# Patient Record
Sex: FEMALE | Race: WHITE | NOT HISPANIC OR LATINO | Employment: OTHER | URBAN - METROPOLITAN AREA
[De-identification: names, ages, dates, MRNs, and addresses within clinical notes are randomized per-mention and may not be internally consistent; named-entity substitution may affect disease eponyms.]

---

## 2017-01-27 ENCOUNTER — CONVERSION ENCOUNTER (OUTPATIENT)
Dept: MAMMOGRAPHY | Facility: HOSPITAL | Age: 82
End: 2017-01-27

## 2017-08-30 ENCOUNTER — CONVERSION ENCOUNTER (OUTPATIENT)
Dept: MAMMOGRAPHY | Facility: HOSPITAL | Age: 82
End: 2017-08-30

## 2018-07-23 ENCOUNTER — OFFICE VISIT CONVERTED (OUTPATIENT)
Dept: ORTHOPEDIC SURGERY | Facility: CLINIC | Age: 83
End: 2018-07-23
Attending: ORTHOPAEDIC SURGERY

## 2018-08-10 ENCOUNTER — OFFICE VISIT CONVERTED (OUTPATIENT)
Dept: ORTHOPEDIC SURGERY | Facility: CLINIC | Age: 83
End: 2018-08-10
Attending: PHYSICIAN ASSISTANT

## 2018-08-31 ENCOUNTER — OFFICE VISIT CONVERTED (OUTPATIENT)
Dept: ORTHOPEDIC SURGERY | Facility: CLINIC | Age: 83
End: 2018-08-31
Attending: PHYSICIAN ASSISTANT

## 2018-09-28 ENCOUNTER — OFFICE VISIT CONVERTED (OUTPATIENT)
Dept: ORTHOPEDIC SURGERY | Facility: CLINIC | Age: 83
End: 2018-09-28
Attending: PHYSICIAN ASSISTANT

## 2018-11-07 ENCOUNTER — OFFICE VISIT CONVERTED (OUTPATIENT)
Dept: ORTHOPEDIC SURGERY | Facility: CLINIC | Age: 83
End: 2018-11-07
Attending: PHYSICIAN ASSISTANT

## 2018-11-17 ENCOUNTER — CONVERSION ENCOUNTER (OUTPATIENT)
Dept: MAMMOGRAPHY | Facility: HOSPITAL | Age: 83
End: 2018-11-17

## 2018-12-04 ENCOUNTER — CONVERSION ENCOUNTER (OUTPATIENT)
Dept: GASTROENTEROLOGY | Facility: CLINIC | Age: 83
End: 2018-12-04

## 2018-12-04 ENCOUNTER — OFFICE VISIT CONVERTED (OUTPATIENT)
Dept: GASTROENTEROLOGY | Facility: CLINIC | Age: 83
End: 2018-12-04
Attending: NURSE PRACTITIONER

## 2018-12-27 ENCOUNTER — OFFICE VISIT CONVERTED (OUTPATIENT)
Dept: NEUROLOGY | Facility: CLINIC | Age: 83
End: 2018-12-27
Attending: PSYCHIATRY & NEUROLOGY

## 2019-01-07 ENCOUNTER — OFFICE VISIT CONVERTED (OUTPATIENT)
Dept: ORTHOPEDIC SURGERY | Facility: CLINIC | Age: 84
End: 2019-01-07
Attending: PHYSICIAN ASSISTANT

## 2019-01-11 ENCOUNTER — HOSPITAL ENCOUNTER (OUTPATIENT)
Dept: OTHER | Facility: HOSPITAL | Age: 84
Discharge: HOME | End: 2019-01-11
Attending: FAMILY MEDICINE

## 2019-01-11 LAB
ALBUMIN SERPL-MCNC: 4.4 G/DL (ref 3.5–5)
ALBUMIN/GLOB SERPL: 1.6 {RATIO} (ref 1.4–2.6)
ALP SERPL-CCNC: 112 U/L (ref 43–160)
ALT SERPL-CCNC: 16 U/L (ref 10–40)
ANION GAP SERPL CALC-SCNC: 17 MMOL/L (ref 8–19)
AST SERPL-CCNC: 24 U/L (ref 15–50)
BILIRUB SERPL-MCNC: 0.56 MG/DL (ref 0.2–1.3)
BUN SERPL-MCNC: 23 MG/DL (ref 5–25)
BUN/CREAT SERPL: 29 {RATIO} (ref 6–20)
CALCIUM SERPL-MCNC: 9.4 MG/DL (ref 8.7–10.4)
CHLORIDE SERPL-SCNC: 101 MMOL/L (ref 99–111)
CONV CO2: 29 MMOL/L (ref 22–32)
CONV TOTAL PROTEIN: 7.1 G/DL (ref 6.3–8.2)
CREAT UR-MCNC: 0.78 MG/DL (ref 0.5–0.9)
GFR SERPLBLD BASED ON 1.73 SQ M-ARVRAT: >60 ML/MIN/{1.73_M2}
GLOBULIN UR ELPH-MCNC: 2.7 G/DL (ref 2–3.5)
GLUCOSE SERPL-MCNC: 72 MG/DL (ref 65–99)
OSMOLALITY SERPL CALC.SUM OF ELEC: 298 MOSM/KG (ref 273–304)
POTASSIUM SERPL-SCNC: 4 MMOL/L (ref 3.5–5.3)
SODIUM SERPL-SCNC: 143 MMOL/L (ref 135–147)

## 2019-02-18 ENCOUNTER — HOSPITAL ENCOUNTER (OUTPATIENT)
Dept: OTHER | Facility: HOSPITAL | Age: 84
Discharge: HOME | End: 2019-02-18
Attending: FAMILY MEDICINE

## 2019-02-18 ENCOUNTER — OFFICE VISIT CONVERTED (OUTPATIENT)
Dept: ORTHOPEDIC SURGERY | Facility: CLINIC | Age: 84
End: 2019-02-18
Attending: PHYSICIAN ASSISTANT

## 2019-02-18 LAB
APPEARANCE UR: ABNORMAL
BILIRUB UR QL: NEGATIVE
COLOR UR: YELLOW
CONV BACTERIA: NEGATIVE
CONV COLLECTION SOURCE (UA): ABNORMAL
CONV UROBILINOGEN IN URINE BY AUTOMATED TEST STRIP: 0.2 {EHRLICHU}/DL (ref 0.1–1)
GLUCOSE UR QL: NEGATIVE MG/DL
HGB UR QL STRIP: NEGATIVE
KETONES UR QL STRIP: NEGATIVE MG/DL
LEUKOCYTE ESTERASE UR QL STRIP: ABNORMAL
NITRITE UR QL STRIP: NEGATIVE
PH UR STRIP.AUTO: 5 [PH] (ref 5–8)
PROT UR QL: NEGATIVE MG/DL
RBC #/AREA URNS HPF: ABNORMAL /[HPF]
SP GR UR: 1.02 (ref 1–1.03)
SQUAMOUS SPT QL MICRO: ABNORMAL /[HPF]
WBC #/AREA URNS HPF: ABNORMAL /[HPF]

## 2019-02-20 LAB
AMOXICILLIN+CLAV SUSC ISLT: 8
AMPICILLIN SUSC ISLT: >=32
AMPICILLIN+SULBAC SUSC ISLT: >=32
BACTERIA UR CULT: ABNORMAL
CEFAZOLIN SUSC ISLT: 8
CEFEPIME SUSC ISLT: <=1
CEFTAZIDIME SUSC ISLT: <=1
CEFTRIAXONE SUSC ISLT: <=1
CEFUROXIME ORAL SUSC ISLT: <=1
CEFUROXIME PARENTER SUSC ISLT: <=1
CIPROFLOXACIN SUSC ISLT: <=0.25
ERTAPENEM SUSC ISLT: <=0.5
GENTAMICIN SUSC ISLT: <=1
LEVOFLOXACIN SUSC ISLT: <=0.12
NITROFURANTOIN SUSC ISLT: 128
TETRACYCLINE SUSC ISLT: >=16
TMP SMX SUSC ISLT: >=320
TOBRAMYCIN SUSC ISLT: <=1

## 2019-05-14 ENCOUNTER — HOSPITAL ENCOUNTER (OUTPATIENT)
Dept: OTHER | Facility: HOSPITAL | Age: 84
Discharge: HOME | End: 2019-05-14
Attending: FAMILY MEDICINE

## 2019-05-14 LAB
ALBUMIN SERPL-MCNC: 4 G/DL (ref 3.5–5)
ALBUMIN/GLOB SERPL: 1.5 {RATIO} (ref 1.4–2.6)
ALP SERPL-CCNC: 111 U/L (ref 43–160)
ALT SERPL-CCNC: 15 U/L (ref 10–40)
ANION GAP SERPL CALC-SCNC: 17 MMOL/L (ref 8–19)
AST SERPL-CCNC: 21 U/L (ref 15–50)
BILIRUB SERPL-MCNC: 0.47 MG/DL (ref 0.2–1.3)
BUN SERPL-MCNC: 24 MG/DL (ref 5–25)
BUN/CREAT SERPL: 30 {RATIO} (ref 6–20)
CALCIUM SERPL-MCNC: 9.6 MG/DL (ref 8.7–10.4)
CHLORIDE SERPL-SCNC: 104 MMOL/L (ref 99–111)
CONV CO2: 29 MMOL/L (ref 22–32)
CONV TOTAL PROTEIN: 6.7 G/DL (ref 6.3–8.2)
CREAT UR-MCNC: 0.8 MG/DL (ref 0.5–0.9)
GFR SERPLBLD BASED ON 1.73 SQ M-ARVRAT: >60 ML/MIN/{1.73_M2}
GLOBULIN UR ELPH-MCNC: 2.7 G/DL (ref 2–3.5)
GLUCOSE SERPL-MCNC: 88 MG/DL (ref 65–99)
OSMOLALITY SERPL CALC.SUM OF ELEC: 307 MOSM/KG (ref 273–304)
POTASSIUM SERPL-SCNC: 3.3 MMOL/L (ref 3.5–5.3)
SODIUM SERPL-SCNC: 147 MMOL/L (ref 135–147)

## 2019-06-11 ENCOUNTER — HOSPITAL ENCOUNTER (OUTPATIENT)
Dept: OTHER | Facility: HOSPITAL | Age: 84
Discharge: HOME | End: 2019-06-11
Attending: FAMILY MEDICINE

## 2019-06-11 LAB
APPEARANCE UR: CLEAR
BILIRUB UR QL: NEGATIVE
COLOR UR: YELLOW
CONV BACTERIA: NEGATIVE
CONV COLLECTION SOURCE (UA): ABNORMAL
CONV HYALINE CASTS IN URINE MICRO: ABNORMAL /[LPF]
CONV UROBILINOGEN IN URINE BY AUTOMATED TEST STRIP: 1 {EHRLICHU}/DL (ref 0.1–1)
GLUCOSE UR QL: NEGATIVE MG/DL
HGB UR QL STRIP: NEGATIVE
KETONES UR QL STRIP: NEGATIVE MG/DL
LEUKOCYTE ESTERASE UR QL STRIP: ABNORMAL
NITRITE UR QL STRIP: NEGATIVE
PH UR STRIP.AUTO: 6 [PH] (ref 5–8)
PROT UR QL: NEGATIVE MG/DL
RBC #/AREA URNS HPF: ABNORMAL /[HPF]
SP GR UR: 1.02 (ref 1–1.03)
SQUAMOUS SPT QL MICRO: ABNORMAL /[HPF]
WBC #/AREA URNS HPF: ABNORMAL /[HPF]

## 2019-06-13 LAB — BACTERIA UR CULT: NORMAL

## 2019-06-26 ENCOUNTER — HOSPITAL ENCOUNTER (OUTPATIENT)
Dept: OTHER | Facility: HOSPITAL | Age: 84
Discharge: HOME | End: 2019-06-26
Attending: FAMILY MEDICINE

## 2019-06-26 LAB
ANION GAP SERPL CALC-SCNC: 17 MMOL/L (ref 8–19)
BUN SERPL-MCNC: 26 MG/DL (ref 5–25)
BUN/CREAT SERPL: 28 {RATIO} (ref 6–20)
CALCIUM SERPL-MCNC: 9.2 MG/DL (ref 8.7–10.4)
CHLORIDE SERPL-SCNC: 104 MMOL/L (ref 99–111)
CONV CO2: 25 MMOL/L (ref 22–32)
CREAT UR-MCNC: 0.94 MG/DL (ref 0.5–0.9)
GFR SERPLBLD BASED ON 1.73 SQ M-ARVRAT: 57 ML/MIN/{1.73_M2}
GLUCOSE SERPL-MCNC: 87 MG/DL (ref 65–99)
OSMOLALITY SERPL CALC.SUM OF ELEC: 298 MOSM/KG (ref 273–304)
POTASSIUM SERPL-SCNC: 3.8 MMOL/L (ref 3.5–5.3)
SODIUM SERPL-SCNC: 142 MMOL/L (ref 135–147)

## 2019-07-11 ENCOUNTER — HOSPITAL ENCOUNTER (OUTPATIENT)
Dept: URGENT CARE | Facility: CLINIC | Age: 84
Discharge: HOME | End: 2019-07-11

## 2019-09-05 ENCOUNTER — HOSPITAL ENCOUNTER (OUTPATIENT)
Dept: OTHER | Facility: HOSPITAL | Age: 84
Discharge: HOME | End: 2019-09-05
Attending: FAMILY MEDICINE

## 2019-09-05 LAB
ALBUMIN SERPL-MCNC: 4.3 G/DL (ref 3.5–5)
ALBUMIN/GLOB SERPL: 1.8 {RATIO} (ref 1.4–2.6)
ALP SERPL-CCNC: 101 U/L (ref 43–160)
ALT SERPL-CCNC: 14 U/L (ref 10–40)
ANION GAP SERPL CALC-SCNC: 17 MMOL/L (ref 8–19)
AST SERPL-CCNC: 21 U/L (ref 15–50)
BILIRUB SERPL-MCNC: 0.63 MG/DL (ref 0.2–1.3)
BUN SERPL-MCNC: 26 MG/DL (ref 5–25)
BUN/CREAT SERPL: 34 {RATIO} (ref 6–20)
CALCIUM SERPL-MCNC: 9.3 MG/DL (ref 8.7–10.4)
CHLORIDE SERPL-SCNC: 105 MMOL/L (ref 99–111)
CHOLEST SERPL-MCNC: 160 MG/DL (ref 107–200)
CHOLEST/HDLC SERPL: 2.8 {RATIO} (ref 3–6)
CONV CO2: 26 MMOL/L (ref 22–32)
CONV TOTAL PROTEIN: 6.7 G/DL (ref 6.3–8.2)
CREAT UR-MCNC: 0.77 MG/DL (ref 0.5–0.9)
GFR SERPLBLD BASED ON 1.73 SQ M-ARVRAT: >60 ML/MIN/{1.73_M2}
GLOBULIN UR ELPH-MCNC: 2.4 G/DL (ref 2–3.5)
GLUCOSE SERPL-MCNC: 92 MG/DL (ref 65–99)
HDLC SERPL-MCNC: 57 MG/DL (ref 40–60)
LDLC SERPL CALC-MCNC: 81 MG/DL (ref 70–100)
OSMOLALITY SERPL CALC.SUM OF ELEC: 302 MOSM/KG (ref 273–304)
POTASSIUM SERPL-SCNC: 3.8 MMOL/L (ref 3.5–5.3)
SODIUM SERPL-SCNC: 144 MMOL/L (ref 135–147)
TRIGL SERPL-MCNC: 109 MG/DL (ref 40–150)
VLDLC SERPL-MCNC: 22 MG/DL (ref 5–37)

## 2019-09-06 ENCOUNTER — HOSPITAL ENCOUNTER (OUTPATIENT)
Dept: URGENT CARE | Facility: CLINIC | Age: 84
Discharge: HOME | End: 2019-09-06
Attending: PHYSICIAN ASSISTANT

## 2019-12-29 ENCOUNTER — HOSPITAL ENCOUNTER (OUTPATIENT)
Dept: URGENT CARE | Facility: CLINIC | Age: 84
Discharge: HOME | End: 2019-12-29

## 2019-12-31 LAB — BACTERIA UR CULT: NORMAL

## 2020-01-13 ENCOUNTER — HOSPITAL ENCOUNTER (OUTPATIENT)
Dept: OTHER | Facility: HOSPITAL | Age: 85
Discharge: HOME | End: 2020-01-13
Attending: FAMILY MEDICINE

## 2020-01-13 LAB
ALBUMIN SERPL-MCNC: 4.2 G/DL (ref 3.5–5)
ALBUMIN/GLOB SERPL: 1.8 {RATIO} (ref 1.4–2.6)
ALP SERPL-CCNC: 93 U/L (ref 43–160)
ALT SERPL-CCNC: 12 U/L (ref 10–40)
ANION GAP SERPL CALC-SCNC: 15 MMOL/L (ref 8–19)
AST SERPL-CCNC: 19 U/L (ref 15–50)
BILIRUB SERPL-MCNC: 0.45 MG/DL (ref 0.2–1.3)
BUN SERPL-MCNC: 24 MG/DL (ref 5–25)
BUN/CREAT SERPL: 23 {RATIO} (ref 6–20)
CALCIUM SERPL-MCNC: 10 MG/DL (ref 8.7–10.4)
CHLORIDE SERPL-SCNC: 104 MMOL/L (ref 99–111)
CONV CO2: 28 MMOL/L (ref 22–32)
CONV TOTAL PROTEIN: 6.6 G/DL (ref 6.3–8.2)
CREAT UR-MCNC: 1.04 MG/DL (ref 0.5–0.9)
GFR SERPLBLD BASED ON 1.73 SQ M-ARVRAT: 50 ML/MIN/{1.73_M2}
GLOBULIN UR ELPH-MCNC: 2.4 G/DL (ref 2–3.5)
GLUCOSE SERPL-MCNC: 88 MG/DL (ref 65–99)
OSMOLALITY SERPL CALC.SUM OF ELEC: 299 MOSM/KG (ref 273–304)
POTASSIUM SERPL-SCNC: 3.8 MMOL/L (ref 3.5–5.3)
SODIUM SERPL-SCNC: 143 MMOL/L (ref 135–147)

## 2020-01-16 ENCOUNTER — HOSPITAL ENCOUNTER (OUTPATIENT)
Dept: OTHER | Facility: HOSPITAL | Age: 85
Discharge: HOME | End: 2020-01-16
Attending: FAMILY MEDICINE

## 2020-01-16 LAB
APPEARANCE UR: CLEAR
BILIRUB UR QL: NEGATIVE
COLOR UR: YELLOW
CONV BACTERIA: NEGATIVE
CONV COLLECTION SOURCE (UA): ABNORMAL
CONV UROBILINOGEN IN URINE BY AUTOMATED TEST STRIP: 0.2 {EHRLICHU}/DL (ref 0.1–1)
GLUCOSE UR QL: NEGATIVE MG/DL
HGB UR QL STRIP: NEGATIVE
KETONES UR QL STRIP: NEGATIVE MG/DL
LEUKOCYTE ESTERASE UR QL STRIP: ABNORMAL
NITRITE UR QL STRIP: NEGATIVE
PH UR STRIP.AUTO: 6 [PH] (ref 5–8)
PROT UR QL: NEGATIVE MG/DL
RBC #/AREA URNS HPF: ABNORMAL /[HPF]
SP GR UR: 1.01 (ref 1–1.03)
SQUAMOUS SPT QL MICRO: ABNORMAL /[HPF]
WBC #/AREA URNS HPF: ABNORMAL /[HPF]

## 2020-01-19 LAB
AMOXICILLIN+CLAV SUSC ISLT: 16
AMOXICILLIN+CLAV SUSC ISLT: 8
AMPICILLIN SUSC ISLT: >=32
AMPICILLIN SUSC ISLT: >=32
AMPICILLIN+SULBAC SUSC ISLT: 16
AMPICILLIN+SULBAC SUSC ISLT: >=32
BACTERIA UR CULT: ABNORMAL
CEFAZOLIN SUSC ISLT: 16
CEFAZOLIN SUSC ISLT: >=64
CEFEPIME SUSC ISLT: 8
CEFEPIME SUSC ISLT: <=1
CEFTAZIDIME SUSC ISLT: 4
CEFTAZIDIME SUSC ISLT: <=1
CEFTRIAXONE SUSC ISLT: <=1
CEFTRIAXONE SUSC ISLT: >=64
CEFUROXIME ORAL SUSC ISLT: <=1
CEFUROXIME ORAL SUSC ISLT: >=64
CEFUROXIME PARENTER SUSC ISLT: <=1
CEFUROXIME PARENTER SUSC ISLT: >=64
CIPROFLOXACIN SUSC ISLT: <=0.25
CIPROFLOXACIN SUSC ISLT: >=4
ERTAPENEM SUSC ISLT: <=0.5
ERTAPENEM SUSC ISLT: <=0.5
GENTAMICIN SUSC ISLT: <=1
GENTAMICIN SUSC ISLT: >=16
LEVOFLOXACIN SUSC ISLT: <=0.12
LEVOFLOXACIN SUSC ISLT: >=8
NITROFURANTOIN SUSC ISLT: 128
NITROFURANTOIN SUSC ISLT: <=16
TETRACYCLINE SUSC ISLT: <=1
TETRACYCLINE SUSC ISLT: >=16
TMP SMX SUSC ISLT: <=20
TMP SMX SUSC ISLT: >=320
TOBRAMYCIN SUSC ISLT: 8
TOBRAMYCIN SUSC ISLT: <=1

## 2020-02-05 ENCOUNTER — HOSPITAL ENCOUNTER (OUTPATIENT)
Dept: OTHER | Facility: HOSPITAL | Age: 85
Discharge: HOME | End: 2020-02-05
Attending: FAMILY MEDICINE

## 2020-02-05 LAB
APPEARANCE UR: CLEAR
BILIRUB UR QL: NEGATIVE
COLOR UR: YELLOW
CONV COLLECTION SOURCE (UA): NORMAL
CONV UROBILINOGEN IN URINE BY AUTOMATED TEST STRIP: 1 {EHRLICHU}/DL (ref 0.1–1)
GLUCOSE UR QL: NEGATIVE MG/DL
HGB UR QL STRIP: NEGATIVE
KETONES UR QL STRIP: NEGATIVE MG/DL
LEUKOCYTE ESTERASE UR QL STRIP: NEGATIVE
NITRITE UR QL STRIP: NEGATIVE
PH UR STRIP.AUTO: 5 [PH] (ref 5–8)
PROT UR QL: NEGATIVE MG/DL
SP GR UR: 1.01 (ref 1–1.03)

## 2020-02-07 LAB — BACTERIA UR CULT: NORMAL

## 2020-02-12 ENCOUNTER — HOSPITAL ENCOUNTER (OUTPATIENT)
Dept: OTHER | Facility: HOSPITAL | Age: 85
Discharge: HOME | End: 2020-02-12
Attending: FAMILY MEDICINE

## 2020-02-12 LAB
ALBUMIN SERPL-MCNC: 3.9 G/DL (ref 3.5–5)
ALBUMIN/GLOB SERPL: 1.4 {RATIO} (ref 1.4–2.6)
ALP SERPL-CCNC: 95 U/L (ref 43–160)
ALT SERPL-CCNC: 14 U/L (ref 10–40)
ANION GAP SERPL CALC-SCNC: 18 MMOL/L (ref 8–19)
AST SERPL-CCNC: 19 U/L (ref 15–50)
BASOPHILS # BLD AUTO: 0.06 10*3/UL (ref 0–0.2)
BASOPHILS NFR BLD AUTO: 0.9 % (ref 0–3)
BILIRUB SERPL-MCNC: 0.43 MG/DL (ref 0.2–1.3)
BUN SERPL-MCNC: 16 MG/DL (ref 5–25)
BUN/CREAT SERPL: 22 {RATIO} (ref 6–20)
CALCIUM SERPL-MCNC: 9.7 MG/DL (ref 8.7–10.4)
CHLORIDE SERPL-SCNC: 101 MMOL/L (ref 99–111)
CONV ABS IMM GRAN: 0.05 10*3/UL (ref 0–0.2)
CONV CO2: 29 MMOL/L (ref 22–32)
CONV IMMATURE GRAN: 0.7 % (ref 0–1.8)
CONV TOTAL PROTEIN: 6.6 G/DL (ref 6.3–8.2)
CREAT UR-MCNC: 0.72 MG/DL (ref 0.5–0.9)
DEPRECATED RDW RBC AUTO: 45.1 FL (ref 36.4–46.3)
EOSINOPHIL # BLD AUTO: 0.24 10*3/UL (ref 0–0.7)
EOSINOPHIL # BLD AUTO: 3.6 % (ref 0–7)
ERYTHROCYTE [DISTWIDTH] IN BLOOD BY AUTOMATED COUNT: 13 % (ref 11.7–14.4)
GFR SERPLBLD BASED ON 1.73 SQ M-ARVRAT: >60 ML/MIN/{1.73_M2}
GLOBULIN UR ELPH-MCNC: 2.7 G/DL (ref 2–3.5)
GLUCOSE SERPL-MCNC: 93 MG/DL (ref 65–99)
HCT VFR BLD AUTO: 41.2 % (ref 37–47)
HGB BLD-MCNC: 13.7 G/DL (ref 12–16)
LYMPHOCYTES # BLD AUTO: 1.86 10*3/UL (ref 1–5)
LYMPHOCYTES NFR BLD AUTO: 27.7 % (ref 20–45)
MCH RBC QN AUTO: 31.4 PG (ref 27–31)
MCHC RBC AUTO-ENTMCNC: 33.3 G/DL (ref 33–37)
MCV RBC AUTO: 94.5 FL (ref 81–99)
MONOCYTES # BLD AUTO: 0.49 10*3/UL (ref 0.2–1.2)
MONOCYTES NFR BLD AUTO: 7.3 % (ref 3–10)
NEUTROPHILS # BLD AUTO: 4.02 10*3/UL (ref 2–8)
NEUTROPHILS NFR BLD AUTO: 59.8 % (ref 30–85)
NRBC CBCN: 0 % (ref 0–0.7)
OSMOLALITY SERPL CALC.SUM OF ELEC: 299 MOSM/KG (ref 273–304)
PLATELET # BLD AUTO: 218 10*3/UL (ref 130–400)
PMV BLD AUTO: 10.5 FL (ref 9.4–12.3)
POTASSIUM SERPL-SCNC: 3.5 MMOL/L (ref 3.5–5.3)
RBC # BLD AUTO: 4.36 10*6/UL (ref 4.2–5.4)
SODIUM SERPL-SCNC: 144 MMOL/L (ref 135–147)
T4 FREE SERPL-MCNC: 1.3 NG/DL (ref 0.9–1.8)
TSH SERPL-ACNC: 1.98 M[IU]/L (ref 0.27–4.2)
WBC # BLD AUTO: 6.72 10*3/UL (ref 4.8–10.8)

## 2020-03-16 ENCOUNTER — HOSPITAL ENCOUNTER (OUTPATIENT)
Dept: ULTRASOUND IMAGING | Facility: HOSPITAL | Age: 85
Discharge: HOME | End: 2020-03-16
Attending: FAMILY MEDICINE

## 2020-06-16 ENCOUNTER — HOSPITAL ENCOUNTER (OUTPATIENT)
Dept: OTHER | Facility: HOSPITAL | Age: 85
Discharge: HOME | End: 2020-06-16
Attending: FAMILY MEDICINE

## 2020-06-16 LAB
ALBUMIN SERPL-MCNC: 4.1 G/DL (ref 3.5–5)
ALBUMIN/GLOB SERPL: 1.6 {RATIO} (ref 1.4–2.6)
ALP SERPL-CCNC: 98 U/L (ref 43–160)
ALT SERPL-CCNC: 15 U/L (ref 10–40)
ANION GAP SERPL CALC-SCNC: 15 MMOL/L (ref 8–19)
AST SERPL-CCNC: 21 U/L (ref 15–50)
BILIRUB SERPL-MCNC: 0.51 MG/DL (ref 0.2–1.3)
BUN SERPL-MCNC: 33 MG/DL (ref 5–25)
BUN/CREAT SERPL: 39 {RATIO} (ref 6–20)
CALCIUM SERPL-MCNC: 9.4 MG/DL (ref 8.7–10.4)
CHLORIDE SERPL-SCNC: 103 MMOL/L (ref 99–111)
CONV CO2: 27 MMOL/L (ref 22–32)
CONV TOTAL PROTEIN: 6.6 G/DL (ref 6.3–8.2)
CREAT UR-MCNC: 0.85 MG/DL (ref 0.5–0.9)
GFR SERPLBLD BASED ON 1.73 SQ M-ARVRAT: >60 ML/MIN/{1.73_M2}
GLOBULIN UR ELPH-MCNC: 2.5 G/DL (ref 2–3.5)
GLUCOSE SERPL-MCNC: 97 MG/DL (ref 65–99)
OSMOLALITY SERPL CALC.SUM OF ELEC: 299 MOSM/KG (ref 273–304)
POTASSIUM SERPL-SCNC: 3.6 MMOL/L (ref 3.5–5.3)
SODIUM SERPL-SCNC: 141 MMOL/L (ref 135–147)

## 2020-10-09 ENCOUNTER — HOSPITAL ENCOUNTER (OUTPATIENT)
Dept: OTHER | Facility: HOSPITAL | Age: 85
Discharge: HOME | End: 2020-10-09
Attending: FAMILY MEDICINE

## 2020-10-09 LAB
ALBUMIN SERPL-MCNC: 4.2 G/DL (ref 3.5–5)
ALBUMIN/GLOB SERPL: 1.7 {RATIO} (ref 1.4–2.6)
ALP SERPL-CCNC: 89 U/L (ref 43–160)
ALT SERPL-CCNC: 12 U/L (ref 10–40)
ANION GAP SERPL CALC-SCNC: 15 MMOL/L (ref 8–19)
AST SERPL-CCNC: 21 U/L (ref 15–50)
BILIRUB SERPL-MCNC: 0.62 MG/DL (ref 0.2–1.3)
BUN SERPL-MCNC: 32 MG/DL (ref 5–25)
BUN/CREAT SERPL: 35 {RATIO} (ref 6–20)
CALCIUM SERPL-MCNC: 9.8 MG/DL (ref 8.7–10.4)
CHLORIDE SERPL-SCNC: 100 MMOL/L (ref 99–111)
CHOLEST SERPL-MCNC: 150 MG/DL (ref 107–200)
CHOLEST/HDLC SERPL: 2.7 {RATIO} (ref 3–6)
CONV CO2: 28 MMOL/L (ref 22–32)
CONV TOTAL PROTEIN: 6.7 G/DL (ref 6.3–8.2)
CREAT UR-MCNC: 0.92 MG/DL (ref 0.5–0.9)
GFR SERPLBLD BASED ON 1.73 SQ M-ARVRAT: 58 ML/MIN/{1.73_M2}
GLOBULIN UR ELPH-MCNC: 2.5 G/DL (ref 2–3.5)
GLUCOSE SERPL-MCNC: 87 MG/DL (ref 65–99)
HDLC SERPL-MCNC: 56 MG/DL (ref 40–60)
LDLC SERPL CALC-MCNC: 72 MG/DL (ref 70–100)
OSMOLALITY SERPL CALC.SUM OF ELEC: 294 MOSM/KG (ref 273–304)
POTASSIUM SERPL-SCNC: 3.8 MMOL/L (ref 3.5–5.3)
SODIUM SERPL-SCNC: 139 MMOL/L (ref 135–147)
TRIGL SERPL-MCNC: 111 MG/DL (ref 40–150)
VLDLC SERPL-MCNC: 22 MG/DL (ref 5–37)

## 2021-02-23 ENCOUNTER — HOSPITAL ENCOUNTER (OUTPATIENT)
Dept: OTHER | Facility: HOSPITAL | Age: 86
Discharge: HOME | End: 2021-02-23
Attending: FAMILY MEDICINE

## 2021-02-23 LAB
ALBUMIN SERPL-MCNC: 4.1 G/DL (ref 3.5–5)
ALBUMIN/GLOB SERPL: 1.6 {RATIO} (ref 1.4–2.6)
ALP SERPL-CCNC: 110 U/L (ref 43–160)
ALT SERPL-CCNC: 12 U/L (ref 10–40)
ANION GAP SERPL CALC-SCNC: 14 MMOL/L (ref 8–19)
AST SERPL-CCNC: 25 U/L (ref 15–50)
BILIRUB SERPL-MCNC: 0.36 MG/DL (ref 0.2–1.3)
BUN SERPL-MCNC: 29 MG/DL (ref 5–25)
BUN/CREAT SERPL: 32 {RATIO} (ref 6–20)
CALCIUM SERPL-MCNC: 9.6 MG/DL (ref 8.7–10.4)
CHLORIDE SERPL-SCNC: 105 MMOL/L (ref 99–111)
CONV CO2: 27 MMOL/L (ref 22–32)
CONV TOTAL PROTEIN: 6.6 G/DL (ref 6.3–8.2)
CREAT UR-MCNC: 0.91 MG/DL (ref 0.5–0.9)
GFR SERPLBLD BASED ON 1.73 SQ M-ARVRAT: 58 ML/MIN/{1.73_M2}
GLOBULIN UR ELPH-MCNC: 2.5 G/DL (ref 2–3.5)
GLUCOSE SERPL-MCNC: 80 MG/DL (ref 65–99)
OSMOLALITY SERPL CALC.SUM OF ELEC: 299 MOSM/KG (ref 273–304)
POTASSIUM SERPL-SCNC: 4 MMOL/L (ref 3.5–5.3)
SODIUM SERPL-SCNC: 142 MMOL/L (ref 135–147)

## 2021-03-03 ENCOUNTER — HOSPITAL ENCOUNTER (OUTPATIENT)
Dept: OTHER | Facility: HOSPITAL | Age: 86
Discharge: HOME | End: 2021-03-03
Attending: FAMILY MEDICINE

## 2021-03-03 LAB
APPEARANCE UR: ABNORMAL
BILIRUB UR QL: NEGATIVE
COLOR UR: ABNORMAL
CONV BACTERIA: NEGATIVE
CONV COLLECTION SOURCE (UA): ABNORMAL
CONV HYALINE CASTS IN URINE MICRO: ABNORMAL /[LPF]
CONV UROBILINOGEN IN URINE BY AUTOMATED TEST STRIP: 0.2 {EHRLICHU}/DL (ref 0.1–1)
GLUCOSE UR QL: NEGATIVE MG/DL
HGB UR QL STRIP: NEGATIVE
KETONES UR QL STRIP: ABNORMAL MG/DL
LEUKOCYTE ESTERASE UR QL STRIP: ABNORMAL
NITRITE UR QL STRIP: NEGATIVE
PH UR STRIP.AUTO: 5.5 [PH] (ref 5–8)
PROT UR QL: NEGATIVE MG/DL
RBC #/AREA URNS HPF: ABNORMAL /[HPF]
SP GR UR: 1.02 (ref 1–1.03)
SQUAMOUS SPT QL MICRO: ABNORMAL /[HPF]
WBC #/AREA URNS HPF: ABNORMAL /[HPF]

## 2021-03-05 LAB — BACTERIA UR CULT: NORMAL

## 2021-05-16 VITALS — HEART RATE: 59 BPM | OXYGEN SATURATION: 98 % | BODY MASS INDEX: 30.12 KG/M2 | WEIGHT: 170 LBS | HEIGHT: 63 IN

## 2021-05-16 VITALS — HEIGHT: 62 IN | WEIGHT: 177 LBS | HEART RATE: 66 BPM | BODY MASS INDEX: 32.57 KG/M2 | OXYGEN SATURATION: 97 %

## 2021-05-16 VITALS — WEIGHT: 178 LBS | BODY MASS INDEX: 32.76 KG/M2 | HEIGHT: 62 IN | OXYGEN SATURATION: 97 % | HEART RATE: 71 BPM

## 2021-05-16 VITALS — WEIGHT: 173 LBS | OXYGEN SATURATION: 97 % | HEART RATE: 67 BPM | BODY MASS INDEX: 30.65 KG/M2 | HEIGHT: 63 IN

## 2021-05-16 VITALS — BODY MASS INDEX: 32.02 KG/M2 | HEART RATE: 63 BPM | HEIGHT: 62 IN | WEIGHT: 174 LBS | OXYGEN SATURATION: 94 %

## 2021-05-16 VITALS — WEIGHT: 174.25 LBS | BODY MASS INDEX: 30.88 KG/M2 | OXYGEN SATURATION: 96 % | HEART RATE: 59 BPM | HEIGHT: 63 IN

## 2021-05-16 VITALS
BODY MASS INDEX: 30.65 KG/M2 | SYSTOLIC BLOOD PRESSURE: 141 MMHG | WEIGHT: 173 LBS | HEIGHT: 63 IN | DIASTOLIC BLOOD PRESSURE: 50 MMHG

## 2021-05-16 VITALS — HEART RATE: 57 BPM | WEIGHT: 179 LBS | OXYGEN SATURATION: 94 % | BODY MASS INDEX: 32.94 KG/M2 | HEIGHT: 62 IN

## 2021-05-25 ENCOUNTER — HOSPITAL ENCOUNTER (OUTPATIENT)
Dept: OTHER | Facility: HOSPITAL | Age: 86
Discharge: HOME | End: 2021-05-25
Attending: FAMILY MEDICINE

## 2021-05-25 LAB
ALBUMIN SERPL-MCNC: 4.1 G/DL (ref 3.5–5)
ALBUMIN/GLOB SERPL: 1.4 {RATIO} (ref 1.4–2.6)
ALP SERPL-CCNC: 85 U/L (ref 43–160)
ALT SERPL-CCNC: 13 U/L (ref 10–40)
ANION GAP SERPL CALC-SCNC: 14 MMOL/L (ref 8–19)
AST SERPL-CCNC: 19 U/L (ref 15–50)
BILIRUB SERPL-MCNC: 0.65 MG/DL (ref 0.2–1.3)
BUN SERPL-MCNC: 31 MG/DL (ref 5–25)
BUN/CREAT SERPL: 29 {RATIO} (ref 6–20)
CALCIUM SERPL-MCNC: 9.6 MG/DL (ref 8.7–10.4)
CHLORIDE SERPL-SCNC: 101 MMOL/L (ref 99–111)
CONV CO2: 30 MMOL/L (ref 22–32)
CONV TOTAL PROTEIN: 7 G/DL (ref 6.3–8.2)
CREAT UR-MCNC: 1.08 MG/DL (ref 0.5–0.9)
GFR SERPLBLD BASED ON 1.73 SQ M-ARVRAT: 47 ML/MIN/{1.73_M2}
GLOBULIN UR ELPH-MCNC: 2.9 G/DL (ref 2–3.5)
GLUCOSE SERPL-MCNC: 90 MG/DL (ref 65–99)
OSMOLALITY SERPL CALC.SUM OF ELEC: 296 MOSM/KG (ref 273–304)
POTASSIUM SERPL-SCNC: 4.8 MMOL/L (ref 3.5–5.3)
SODIUM SERPL-SCNC: 140 MMOL/L (ref 135–147)

## 2021-07-09 ENCOUNTER — CLINICAL SUPPORT (OUTPATIENT)
Dept: GASTROENTEROLOGY | Facility: CLINIC | Age: 86
End: 2021-07-09

## 2021-07-09 PROBLEM — E78.5 HYPERLIPEMIA: Status: ACTIVE | Noted: 2021-07-09

## 2021-07-09 PROBLEM — IMO0002 ULCERATIVE LESION: Status: ACTIVE | Noted: 2021-07-09

## 2021-07-09 PROBLEM — F32.A DEPRESSION: Status: ACTIVE | Noted: 2021-07-09

## 2021-07-09 PROBLEM — K50.90 CROHN'S DISEASE (HCC): Status: ACTIVE | Noted: 2021-07-09

## 2021-07-09 PROBLEM — J30.2 SEASONAL ALLERGIC RHINITIS: Status: ACTIVE | Noted: 2021-07-09

## 2021-07-09 PROBLEM — K25.9 GASTRIC ULCER: Status: ACTIVE | Noted: 2021-07-09

## 2021-07-09 PROBLEM — K57.92 DIVERTICULITIS: Status: ACTIVE | Noted: 2021-07-09

## 2021-07-09 PROBLEM — F41.9 ANXIETY: Status: ACTIVE | Noted: 2021-07-09

## 2021-07-09 PROBLEM — N32.9 BLADDER DISORDER: Status: ACTIVE | Noted: 2021-07-09

## 2021-07-09 PROBLEM — J45.909 ASTHMA: Status: ACTIVE | Noted: 2021-07-09

## 2021-07-09 PROBLEM — R06.02 SHORTNESS OF BREATH: Status: ACTIVE | Noted: 2021-07-09

## 2021-07-09 PROBLEM — I10 ESSENTIAL HYPERTENSION: Status: ACTIVE | Noted: 2021-07-09

## 2021-07-09 PROBLEM — R07.9 CHEST PAIN: Status: ACTIVE | Noted: 2021-07-09

## 2021-07-09 PROBLEM — E11.9 DIABETES: Status: ACTIVE | Noted: 2021-07-09

## 2021-07-09 PROBLEM — M19.90 ARTHRITIS: Status: ACTIVE | Noted: 2021-07-09

## 2021-07-09 PROBLEM — K63.5 COLON POLYPS: Status: ACTIVE | Noted: 2021-07-09

## 2021-07-09 RX ORDER — MELATONIN
1000 DAILY
COMMUNITY
End: 2023-02-24

## 2021-07-09 RX ORDER — CHLORTHALIDONE 25 MG/1
TABLET ORAL
COMMUNITY
End: 2022-03-09 | Stop reason: SDUPTHER

## 2021-07-09 RX ORDER — CLOPIDOGREL BISULFATE 75 MG/1
TABLET ORAL
COMMUNITY
End: 2022-03-09 | Stop reason: SDUPTHER

## 2021-07-09 RX ORDER — BENAZEPRIL HYDROCHLORIDE 40 MG/1
TABLET, FILM COATED ORAL
COMMUNITY
End: 2022-03-09 | Stop reason: SDUPTHER

## 2021-07-09 RX ORDER — ASPIRIN 81 MG/1
TABLET ORAL
COMMUNITY

## 2021-07-09 RX ORDER — SERTRALINE HYDROCHLORIDE 100 MG/1
TABLET, FILM COATED ORAL
COMMUNITY
End: 2022-03-09 | Stop reason: SDUPTHER

## 2021-07-09 RX ORDER — PRAVASTATIN SODIUM 20 MG
TABLET ORAL
COMMUNITY
End: 2022-03-09 | Stop reason: SDUPTHER

## 2021-07-09 RX ORDER — AMLODIPINE BESYLATE 10 MG/1
TABLET ORAL
COMMUNITY
End: 2022-03-09 | Stop reason: SDUPTHER

## 2021-08-25 ENCOUNTER — HOSPITAL ENCOUNTER (OUTPATIENT)
Dept: CARDIOLOGY | Facility: HOSPITAL | Age: 86
Discharge: HOME | End: 2021-08-25
Admitting: FAMILY MEDICINE

## 2021-08-25 ENCOUNTER — TRANSCRIBE ORDERS (OUTPATIENT)
Dept: ADMINISTRATIVE | Facility: HOSPITAL | Age: 86
End: 2021-08-25

## 2021-08-25 DIAGNOSIS — I80.3 PHLEBITIS AND THROMBOPHLEBITIS OF LOWER EXTREMITIES, UNSPECIFIED: ICD-10-CM

## 2021-08-25 DIAGNOSIS — I80.9 PHLEBITIS: Primary | ICD-10-CM

## 2021-08-25 DIAGNOSIS — I80.9 PHLEBITIS: ICD-10-CM

## 2021-08-25 LAB
BH CV LOW VAS RIGHT LESSER SAPH VESSEL: 1
BH CV LOW VAS RIGHT VARICOSITY BK VESSEL: 1
BH CV LOWER VASCULAR LEFT COMMON FEMORAL AUGMENT: NORMAL
BH CV LOWER VASCULAR LEFT COMMON FEMORAL COMPETENT: NORMAL
BH CV LOWER VASCULAR LEFT COMMON FEMORAL COMPRESS: NORMAL
BH CV LOWER VASCULAR LEFT COMMON FEMORAL PHASIC: NORMAL
BH CV LOWER VASCULAR LEFT COMMON FEMORAL SPONT: NORMAL
BH CV LOWER VASCULAR RIGHT COMMON FEMORAL AUGMENT: NORMAL
BH CV LOWER VASCULAR RIGHT COMMON FEMORAL COMPETENT: NORMAL
BH CV LOWER VASCULAR RIGHT COMMON FEMORAL COMPRESS: NORMAL
BH CV LOWER VASCULAR RIGHT COMMON FEMORAL PHASIC: NORMAL
BH CV LOWER VASCULAR RIGHT COMMON FEMORAL SPONT: NORMAL
BH CV LOWER VASCULAR RIGHT DISTAL FEMORAL COMPRESS: NORMAL
BH CV LOWER VASCULAR RIGHT GASTRONEMIUS COMPRESS: NORMAL
BH CV LOWER VASCULAR RIGHT GREATER SAPH AK COMPETENT: NORMAL
BH CV LOWER VASCULAR RIGHT GREATER SAPH AK COMPRESS: NORMAL
BH CV LOWER VASCULAR RIGHT GREATER SAPH BK COMPRESS: NORMAL
BH CV LOWER VASCULAR RIGHT LESSER SAPH COMPRESS: NORMAL
BH CV LOWER VASCULAR RIGHT LESSER SAPH THROMBUS: NORMAL
BH CV LOWER VASCULAR RIGHT MID FEMORAL AUGMENT: NORMAL
BH CV LOWER VASCULAR RIGHT MID FEMORAL COMPETENT: NORMAL
BH CV LOWER VASCULAR RIGHT MID FEMORAL COMPRESS: NORMAL
BH CV LOWER VASCULAR RIGHT MID FEMORAL PHASIC: NORMAL
BH CV LOWER VASCULAR RIGHT MID FEMORAL SPONT: NORMAL
BH CV LOWER VASCULAR RIGHT PERONEAL COMPRESS: NORMAL
BH CV LOWER VASCULAR RIGHT POPLITEAL AUGMENT: NORMAL
BH CV LOWER VASCULAR RIGHT POPLITEAL COMPETENT: NORMAL
BH CV LOWER VASCULAR RIGHT POPLITEAL COMPRESS: NORMAL
BH CV LOWER VASCULAR RIGHT POPLITEAL PHASIC: NORMAL
BH CV LOWER VASCULAR RIGHT POPLITEAL SPONT: NORMAL
BH CV LOWER VASCULAR RIGHT POSTERIOR TIBIAL COMPRESS: NORMAL
BH CV LOWER VASCULAR RIGHT PROXIMAL FEMORAL COMPRESS: NORMAL
BH CV LOWER VASCULAR RIGHT SAPHENOFEMORAL JUNCTION COMPRESS: NORMAL
BH CV LOWER VASCULAR RIGHT VARICOSITY AK COMPRESS: NORMAL
BH CV LOWER VASCULAR RIGHT VARICOSITY BK COMPRESS: NORMAL
BH CV LOWER VASCULAR RIGHT VARICOSITY BK THROMBUS: NORMAL
MAXIMAL PREDICTED HEART RATE: 137 BPM
STRESS TARGET HR: 116 BPM

## 2021-08-25 PROCEDURE — 93971 EXTREMITY STUDY: CPT | Performed by: SURGERY

## 2021-08-25 PROCEDURE — 93971 EXTREMITY STUDY: CPT

## 2021-09-21 ENCOUNTER — TRANSCRIBE ORDERS (OUTPATIENT)
Dept: ADMINISTRATIVE | Facility: HOSPITAL | Age: 86
End: 2021-09-21

## 2021-09-21 ENCOUNTER — LAB (OUTPATIENT)
Dept: LAB | Facility: HOSPITAL | Age: 86
End: 2021-09-21

## 2021-09-21 DIAGNOSIS — E78.5 HYPERLIPIDEMIA, UNSPECIFIED HYPERLIPIDEMIA TYPE: ICD-10-CM

## 2021-09-21 DIAGNOSIS — E78.5 HYPERLIPIDEMIA, UNSPECIFIED HYPERLIPIDEMIA TYPE: Primary | ICD-10-CM

## 2021-09-21 LAB
ALBUMIN SERPL-MCNC: 4.3 G/DL (ref 3.5–5.2)
ALBUMIN/GLOB SERPL: 1.8 G/DL
ALP SERPL-CCNC: 85 U/L (ref 39–117)
ALT SERPL W P-5'-P-CCNC: 10 U/L (ref 1–33)
ANION GAP SERPL CALCULATED.3IONS-SCNC: 9.4 MMOL/L (ref 5–15)
AST SERPL-CCNC: 16 U/L (ref 1–32)
BASOPHILS # BLD AUTO: 0.03 10*3/MM3 (ref 0–0.2)
BASOPHILS NFR BLD AUTO: 0.5 % (ref 0–1.5)
BILIRUB SERPL-MCNC: 0.6 MG/DL (ref 0–1.2)
BUN SERPL-MCNC: 35 MG/DL (ref 8–23)
BUN/CREAT SERPL: 34 (ref 7–25)
CALCIUM SPEC-SCNC: 9.7 MG/DL (ref 8.6–10.5)
CHLORIDE SERPL-SCNC: 103 MMOL/L (ref 98–107)
CHOLEST SERPL-MCNC: 134 MG/DL (ref 0–200)
CO2 SERPL-SCNC: 28.6 MMOL/L (ref 22–29)
CREAT SERPL-MCNC: 1.03 MG/DL (ref 0.57–1)
DEPRECATED RDW RBC AUTO: 46.3 FL (ref 37–54)
EOSINOPHIL # BLD AUTO: 0.19 10*3/MM3 (ref 0–0.4)
EOSINOPHIL NFR BLD AUTO: 3.5 % (ref 0.3–6.2)
ERYTHROCYTE [DISTWIDTH] IN BLOOD BY AUTOMATED COUNT: 12.9 % (ref 12.3–15.4)
GFR SERPL CREATININE-BSD FRML MDRD: 51 ML/MIN/1.73
GLOBULIN UR ELPH-MCNC: 2.4 GM/DL
GLUCOSE SERPL-MCNC: 84 MG/DL (ref 65–99)
HCT VFR BLD AUTO: 43.3 % (ref 34–46.6)
HDLC SERPL-MCNC: 51 MG/DL (ref 40–60)
HGB BLD-MCNC: 14.8 G/DL (ref 12–15.9)
IMM GRANULOCYTES # BLD AUTO: 0.02 10*3/MM3 (ref 0–0.05)
IMM GRANULOCYTES NFR BLD AUTO: 0.4 % (ref 0–0.5)
LDLC SERPL CALC-MCNC: 66 MG/DL (ref 0–100)
LDLC/HDLC SERPL: 1.28 {RATIO}
LYMPHOCYTES # BLD AUTO: 1.71 10*3/MM3 (ref 0.7–3.1)
LYMPHOCYTES NFR BLD AUTO: 31.1 % (ref 19.6–45.3)
MCH RBC QN AUTO: 33.3 PG (ref 26.6–33)
MCHC RBC AUTO-ENTMCNC: 34.2 G/DL (ref 31.5–35.7)
MCV RBC AUTO: 97.3 FL (ref 79–97)
MONOCYTES # BLD AUTO: 0.48 10*3/MM3 (ref 0.1–0.9)
MONOCYTES NFR BLD AUTO: 8.7 % (ref 5–12)
NEUTROPHILS NFR BLD AUTO: 3.07 10*3/MM3 (ref 1.7–7)
NEUTROPHILS NFR BLD AUTO: 55.8 % (ref 42.7–76)
NRBC BLD AUTO-RTO: 0 /100 WBC (ref 0–0.2)
PLATELET # BLD AUTO: 156 10*3/MM3 (ref 140–450)
PMV BLD AUTO: 11.4 FL (ref 6–12)
POTASSIUM SERPL-SCNC: 3.9 MMOL/L (ref 3.5–5.2)
PROT SERPL-MCNC: 6.7 G/DL (ref 6–8.5)
RBC # BLD AUTO: 4.45 10*6/MM3 (ref 3.77–5.28)
SODIUM SERPL-SCNC: 141 MMOL/L (ref 136–145)
TRIGL SERPL-MCNC: 89 MG/DL (ref 0–150)
VLDLC SERPL-MCNC: 17 MG/DL (ref 5–40)
WBC # BLD AUTO: 5.5 10*3/MM3 (ref 3.4–10.8)

## 2021-09-21 PROCEDURE — 36415 COLL VENOUS BLD VENIPUNCTURE: CPT

## 2021-09-21 PROCEDURE — 85025 COMPLETE CBC W/AUTO DIFF WBC: CPT

## 2021-09-21 PROCEDURE — 80061 LIPID PANEL: CPT

## 2021-09-21 PROCEDURE — 80053 COMPREHEN METABOLIC PANEL: CPT

## 2021-12-07 ENCOUNTER — TRANSCRIBE ORDERS (OUTPATIENT)
Dept: ADMINISTRATIVE | Facility: HOSPITAL | Age: 86
End: 2021-12-07

## 2021-12-07 ENCOUNTER — HOSPITAL ENCOUNTER (OUTPATIENT)
Dept: CARDIOLOGY | Facility: HOSPITAL | Age: 86
Discharge: HOME | End: 2021-12-07
Admitting: FAMILY MEDICINE

## 2021-12-07 DIAGNOSIS — M79.605 BILATERAL LEG PAIN: Primary | ICD-10-CM

## 2021-12-07 DIAGNOSIS — M79.604 BILATERAL LEG PAIN: Primary | ICD-10-CM

## 2021-12-07 DIAGNOSIS — I80.9 PHLEBITIS: ICD-10-CM

## 2021-12-07 DIAGNOSIS — M79.605 BILATERAL LEG PAIN: ICD-10-CM

## 2021-12-07 DIAGNOSIS — M79.604 BILATERAL LEG PAIN: ICD-10-CM

## 2021-12-07 PROCEDURE — 93970 EXTREMITY STUDY: CPT | Performed by: SURGERY

## 2021-12-07 PROCEDURE — 93970 EXTREMITY STUDY: CPT

## 2021-12-08 LAB
BH CV LOW VAS RIGHT GREATER SAPH AK VESSEL: 1
BH CV LOW VAS RIGHT GREATER SAPH BK VESSEL: 1
BH CV LOW VAS RIGHT SAPHENOFEMORAL JUNCTION SPONT: 1
BH CV LOW VAS RIGHT VARICOSITY BK VESSEL: 1
BH CV LOWER VASCULAR LEFT COMMON FEMORAL AUGMENT: NORMAL
BH CV LOWER VASCULAR LEFT COMMON FEMORAL COMPETENT: NORMAL
BH CV LOWER VASCULAR LEFT COMMON FEMORAL COMPRESS: NORMAL
BH CV LOWER VASCULAR LEFT COMMON FEMORAL PHASIC: NORMAL
BH CV LOWER VASCULAR LEFT COMMON FEMORAL SPONT: NORMAL
BH CV LOWER VASCULAR LEFT DISTAL FEMORAL COMPRESS: NORMAL
BH CV LOWER VASCULAR LEFT GREATER SAPH AK COMPRESS: NORMAL
BH CV LOWER VASCULAR LEFT MID FEMORAL AUGMENT: NORMAL
BH CV LOWER VASCULAR LEFT MID FEMORAL COMPETENT: NORMAL
BH CV LOWER VASCULAR LEFT MID FEMORAL COMPRESS: NORMAL
BH CV LOWER VASCULAR LEFT MID FEMORAL PHASIC: NORMAL
BH CV LOWER VASCULAR LEFT MID FEMORAL SPONT: NORMAL
BH CV LOWER VASCULAR LEFT PERONEAL COMPRESS: NORMAL
BH CV LOWER VASCULAR LEFT POPLITEAL AUGMENT: NORMAL
BH CV LOWER VASCULAR LEFT POPLITEAL COMPETENT: NORMAL
BH CV LOWER VASCULAR LEFT POPLITEAL COMPRESS: NORMAL
BH CV LOWER VASCULAR LEFT POPLITEAL PHASIC: NORMAL
BH CV LOWER VASCULAR LEFT POPLITEAL SPONT: NORMAL
BH CV LOWER VASCULAR LEFT POSTERIOR TIBIAL COMPRESS: NORMAL
BH CV LOWER VASCULAR LEFT PROXIMAL FEMORAL COMPRESS: NORMAL
BH CV LOWER VASCULAR LEFT SAPHENOFEMORAL JUNCTION COMPRESS: NORMAL
BH CV LOWER VASCULAR RIGHT COMMON FEMORAL AUGMENT: NORMAL
BH CV LOWER VASCULAR RIGHT COMMON FEMORAL COMPETENT: NORMAL
BH CV LOWER VASCULAR RIGHT COMMON FEMORAL COMPRESS: NORMAL
BH CV LOWER VASCULAR RIGHT COMMON FEMORAL PHASIC: NORMAL
BH CV LOWER VASCULAR RIGHT COMMON FEMORAL SPONT: NORMAL
BH CV LOWER VASCULAR RIGHT DISTAL FEMORAL COMPRESS: NORMAL
BH CV LOWER VASCULAR RIGHT GASTRONEMIUS COMPRESS: NORMAL
BH CV LOWER VASCULAR RIGHT GREATER SAPH AK COMPRESS: NORMAL
BH CV LOWER VASCULAR RIGHT GREATER SAPH BK COMPRESS: NORMAL
BH CV LOWER VASCULAR RIGHT LESSER SAPH COMPRESS: NORMAL
BH CV LOWER VASCULAR RIGHT MID FEMORAL AUGMENT: NORMAL
BH CV LOWER VASCULAR RIGHT MID FEMORAL COMPETENT: NORMAL
BH CV LOWER VASCULAR RIGHT MID FEMORAL COMPRESS: NORMAL
BH CV LOWER VASCULAR RIGHT MID FEMORAL PHASIC: NORMAL
BH CV LOWER VASCULAR RIGHT MID FEMORAL SPONT: NORMAL
BH CV LOWER VASCULAR RIGHT PERONEAL COMPRESS: NORMAL
BH CV LOWER VASCULAR RIGHT POPLITEAL AUGMENT: NORMAL
BH CV LOWER VASCULAR RIGHT POPLITEAL COMPETENT: NORMAL
BH CV LOWER VASCULAR RIGHT POPLITEAL COMPRESS: NORMAL
BH CV LOWER VASCULAR RIGHT POPLITEAL PHASIC: NORMAL
BH CV LOWER VASCULAR RIGHT POPLITEAL SPONT: NORMAL
BH CV LOWER VASCULAR RIGHT POSTERIOR TIBIAL COMPRESS: NORMAL
BH CV LOWER VASCULAR RIGHT PROXIMAL FEMORAL COMPRESS: NORMAL
BH CV LOWER VASCULAR RIGHT SAPHENOFEMORAL JUNCTION COMPRESS: NORMAL
BH CV LOWER VASCULAR RIGHT SAPHENOFEMORAL JUNCTION THROMBUS: NORMAL
BH CV LOWER VASCULAR RIGHT VARICOSITY BK COMPRESS: NORMAL
BH CV LOWER VASCULAR RIGHT VARICOSITY BK THROMBUS: NORMAL
MAXIMAL PREDICTED HEART RATE: 136 BPM
STRESS TARGET HR: 116 BPM

## 2022-01-13 ENCOUNTER — OFFICE VISIT (OUTPATIENT)
Dept: OBSTETRICS AND GYNECOLOGY | Facility: CLINIC | Age: 87
End: 2022-01-13

## 2022-01-13 VITALS
BODY MASS INDEX: 30.55 KG/M2 | SYSTOLIC BLOOD PRESSURE: 138 MMHG | HEART RATE: 57 BPM | HEIGHT: 62 IN | DIASTOLIC BLOOD PRESSURE: 57 MMHG | WEIGHT: 166 LBS

## 2022-01-13 DIAGNOSIS — Z12.31 ENCOUNTER FOR SCREENING MAMMOGRAM FOR HIGH-RISK PATIENT: ICD-10-CM

## 2022-01-13 DIAGNOSIS — Z01.419 WELL WOMAN EXAM WITH ROUTINE GYNECOLOGICAL EXAM: Primary | ICD-10-CM

## 2022-01-13 DIAGNOSIS — N90.4 LICHEN SCLEROSUS OF FEMALE GENITALIA: ICD-10-CM

## 2022-01-13 PROCEDURE — G0101 CA SCREEN;PELVIC/BREAST EXAM: HCPCS | Performed by: OBSTETRICS & GYNECOLOGY

## 2022-01-13 RX ORDER — OXYBUTYNIN CHLORIDE 10 MG/1
TABLET, EXTENDED RELEASE ORAL
COMMUNITY
Start: 2021-11-24 | End: 2022-03-09

## 2022-01-13 NOTE — PROGRESS NOTES
"Well Woman Visit    CC: Scheduled annual well gyn visit  Chief Complaint   Patient presents with   • Annual Exam       Myriad intake in the past?: No and N/A    Previously Qualified? NO    Contraception or HRT: None    HPI:   86 y.o.     Menses:   q *** days, lasts *** days, changes products q ***hrs on heaviest days.     Pain:  {APPAIN:35734}    {APWWECO (Optional):02078}    History: PMHx, Meds, Allergies, PSHx, Social Hx, and POBHx all reviewed and updated.  {PCP:22985}    PHYSICAL EXAM:  /57   Pulse 57   Ht 158.1 cm (62.25\")   Wt 75.3 kg (166 lb)   Breastfeeding No   BMI 30.12 kg/m²  Not found.  General- NAD, alert and oriented, appropriate  Psych- Normal mood, good memory  Neck- No masses, no thyroid enlargement  CV- Regular rhythm, no murnurs  Resp- CTA to bases, no wheezes  Abdomen- Soft, non distended, non tender, no masses    Breast left-  Bilaterally symmetrical, no masses, non tender, no nipple discharge  Breast right- Bilaterally symmetrical, no masses, non tender, no nipple discharge    External genitalia- Normal female, no lesions  Urethra/meatus- Normal, no masses, non tender  Bladder- Normal, no masses, non tender  Vagina- Normal, no atrophy, no lesions, no discharge.  {PROLAPSE (Optional):36267}  Cvx- {APCVX:81909}  Uterus- {APUTERUS:58855}  Adnexa- {APADNEXA:97401}  Anus/Rectum/Perineum- {APRECTAL:67683}    Lymphatic- No palpable neck, axillary, or groin nodes  Ext- No edema, no cyanosis    Skin- No lesions, no rashes, no acanthosis nigricans  {APNEXPLANON (Optional):11196}    ASSESSMENT and PLAN:    There are no diagnoses linked to this encounter.    Preventative:  • {WWE Preventative:56943}    {MYRIAD (Optional):30474}    She understands the importance of having any ordered tests to be performed in a timely fashion.  The risks of not performing them include, but are not limited to, advanced cancer stages, bone loss from osteoporosis and/or subsequent increase in morbidity and/or " mortality.  She is encouraged to review her results online and/or contact or office if she has questions.     {APGYNCOUNSELING (Optional):67124}    Follow Up:  No follow-ups on file.    {Separate E+M Time Carve Out (Optional):17465}  {Time Spent-Use for E/M Coding (Optional):53173}      Sherin Monroe, RegSched Rep  01/13/2022    [facility]  [address] [place] [address]  Dept: [phone]  Dept Fax: [phone]  Loc: [phone]  Loc Fax: [phone]

## 2022-01-15 NOTE — PROGRESS NOTES
Well Woman Visit    Chief Complaint   Patient presents with   • Annual Exam           HPI  Kristine Rahman is a 86 y.o. female,  who presents for annual well woman exam.LMP s/p menapause Pt is .   Pt is no GYN surgery No itching.  No bleeding.  No hot flashes  Patient is having symptoms of urinary incontinence No.    Additional OB/GYN History   Current contraception: contraceptive methods: Post menopausal status  Desires to: continue contraception  Last Pap :   Last Completed Pap Smear     This patient has no relevant Health Maintenance data.        Result: Normal  History of abnormal Pap smear: no  Last MMG :   Last Completed Mammogram     This patient has no relevant Health Maintenance data.         Last Colonoscopy :   Last Completed Colonoscopy     This patient has no relevant Health Maintenance data.        Hx Myriad intake? : N/A.  Qualified? : N/A    AllergiesLatex   Family history of uterine, colon, breast, or ovarian cancer: yes - cousin colon  Tobacco Usage?: No   OB History        3    Para   3    Term   3            AB        Living   3       SAB        IAB        Ectopic        Molar        Multiple        Live Births   3                The additional following portions of the patient's history were reviewed and updated as appropriate: allergies, current medications, past family history, past medical history, past social history, past surgical history and problem list.    Review of Systems   Constitutional: Negative.    HENT: Negative.    Eyes: Negative.    Respiratory: Negative.    Cardiovascular: Negative.    Gastrointestinal: Negative.    Endocrine: Negative.    Genitourinary: Negative.    Musculoskeletal: Negative.    Skin: Negative.    Allergic/Immunologic: Negative.    Neurological: Negative.    Hematological: Negative.    Psychiatric/Behavioral: Negative.   "      I have reviewed and agree with the HPI, ROS, and historical information as entered above. Leticia ALLEN Moreno, DO    Objective   /57   Pulse 57   Ht 158.1 cm (62.25\")   Wt 75.3 kg (166 lb)   Breastfeeding No   BMI 30.12 kg/m²     Physical Exam  Vitals and nursing note reviewed. Exam conducted with a chaperone present.   Constitutional:       Appearance: Normal appearance.   HENT:      Head: Normocephalic.   Neck:      Thyroid: No thyroid mass or thyromegaly.   Cardiovascular:      Rate and Rhythm: Regular rhythm.      Heart sounds: Normal heart sounds. No murmur heard.      Pulmonary:      Effort: Pulmonary effort is normal.      Breath sounds: Normal breath sounds. No wheezing.   Chest:   Breasts:      Right: Normal. No swelling, bleeding, inverted nipple, mass, nipple discharge, skin change, tenderness, axillary adenopathy or supraclavicular adenopathy.      Left: Normal. No swelling, bleeding, inverted nipple, mass, nipple discharge, skin change, tenderness, axillary adenopathy or supraclavicular adenopathy.       Abdominal:      General: There is no distension.      Palpations: Abdomen is soft. There is no mass.      Tenderness: There is no abdominal tenderness. There is no guarding or rebound.      Hernia: No hernia is present.   Genitourinary:     General: Normal vulva.      Labia:         Right: No lesion.         Left: No lesion.       Urethra: No urethral pain or urethral lesion.      Vagina: Normal. No vaginal discharge, tenderness or prolapsed vaginal walls.      Cervix: Normal.      Uterus: Normal.       Adnexa: Right adnexa normal and left adnexa normal.      Rectum: Normal. Guaiac result negative.      Comments: Nml female external genitalia.  Cervix is nulliparous. Uterus axial normal size shape and consistency.  No adnexal masses are appreciated. +hypopigmentation over clitoral deleon consistent with lichen sclerosis    Musculoskeletal:      Cervical back: Normal range of motion and neck " supple. No tenderness.   Lymphadenopathy:      Upper Body:      Right upper body: No supraclavicular or axillary adenopathy.      Left upper body: No supraclavicular or axillary adenopathy.   Skin:     General: Skin is warm and dry.   Neurological:      Mental Status: She is alert and oriented to person, place, and time.   Psychiatric:         Mood and Affect: Mood normal.         Behavior: Behavior normal.         Thought Content: Thought content normal.            Assessment and Plan    WWE    Diagnoses and all orders for this visit:    1. Well woman exam with routine gynecological exam (Primary)  -     Mammo Screening Digital Tomosynthesis Bilateral With CAD; Future    2. Encounter for screening mammogram for high-risk patient  -     Mammo Screening Digital Tomosynthesis Bilateral With CAD; Future    3. 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 Breast Health- Clinical breast exam and mammogram reviewed specifically American Cancer Society recommendations for screening specifically to her, and self  breast awareness monthly  2. Pap updated today No  3. Smoking status - Non smoker  4. Colon health - screening  colonoscopy as per American Cancer Society recommendations.  5. Bone health - Weight bearing exercise, dietary calcium recommendations and vitamin D reviewed  6. Encouraged to be wary of information obtained via social media and internet based on source and search  7. Follow up prn and one year  8. Eat a diet that includes plenty of vegetables, fruits, low-fat dairy products, and lean protein  9. Do not eat a lot of foods that are high in solid fats, added sugars, or sodium  10. Maintain healthy weight  11. Get regular exercise: at least 150 minutes each week. The exercise should increase your heart rate and make you sweat (moderate-intensity exercise).  12. Do strengthening exercises at least 2x per week. This in addition to moderate-intensity exercise.  13. Spend less time sitting.  Even  light physical activity can be beneficial.  14. Watch cholesterol and blood lipids     Preventative:  • MMG  • Colonoscopy    Does not qualify.      She understands the importance of having the above performed in a timely fashion.  The risks of not performing them include, but are not limited to, advanced cancer stages, bone loss from osteoporosis and/or subsequent increase in morbidity and/or mortality.  She is encouraged to review her results online and/or contact or office if she has questions.     Follow Up:  Return in about 1 year (around 1/13/2023) for Annual physical.        Leticia Moreno,   01/13/2022

## 2022-01-26 ENCOUNTER — HOSPITAL ENCOUNTER (OUTPATIENT)
Dept: MAMMOGRAPHY | Facility: HOSPITAL | Age: 87
Discharge: HOME | End: 2022-01-26
Admitting: OBSTETRICS & GYNECOLOGY

## 2022-01-26 DIAGNOSIS — Z01.419 WELL WOMAN EXAM WITH ROUTINE GYNECOLOGICAL EXAM: ICD-10-CM

## 2022-01-26 DIAGNOSIS — Z12.31 ENCOUNTER FOR SCREENING MAMMOGRAM FOR HIGH-RISK PATIENT: ICD-10-CM

## 2022-01-26 PROCEDURE — 77063 BREAST TOMOSYNTHESIS BI: CPT

## 2022-01-26 PROCEDURE — 77067 SCR MAMMO BI INCL CAD: CPT

## 2022-02-08 ENCOUNTER — OFFICE VISIT (OUTPATIENT)
Dept: VASCULAR SURGERY | Facility: HOSPITAL | Age: 87
End: 2022-02-08

## 2022-02-08 VITALS
DIASTOLIC BLOOD PRESSURE: 78 MMHG | HEART RATE: 54 BPM | TEMPERATURE: 97.6 F | RESPIRATION RATE: 16 BRPM | OXYGEN SATURATION: 97 % | SYSTOLIC BLOOD PRESSURE: 140 MMHG

## 2022-02-08 DIAGNOSIS — I80.9 PHLEBITIS AND THROMBOPHLEBITIS: Primary | ICD-10-CM

## 2022-02-08 PROCEDURE — 99213 OFFICE O/P EST LOW 20 MIN: CPT | Performed by: NURSE PRACTITIONER

## 2022-02-08 PROCEDURE — G0463 HOSPITAL OUTPT CLINIC VISIT: HCPCS | Performed by: NURSE PRACTITIONER

## 2022-02-08 NOTE — PROGRESS NOTES
[facility] Surgery New Patient Office Note     Date of Encounter: 02/08/2022     MRN Number: [id number]  Name: Kristine Rahman  Phone Number: [phone]     Referred By: Justa Bowen MD  PCP: Provider, No Known    Chief Complaint:    Chief Complaint   Patient presents with   • Leg Swelling     right leg since last summer        Subjective      History of Present Illness:      Kristine Rahman is a very pleasant 86 y.o. female who presents with right leg superficial thrombophlebitis.  She explains the symptoms began in the summer 2021, however she has had varicose veins for very long time.  The right leg is significantly worse than the left.  She wears thigh-high 20 to 30 mmHg compression hose.  She denies any pain, swelling or injuries, but does have a strong family history of varicose veins.  She is a non-smoker and lives at home with her granddaughter who is 26.  She takes 81 mg aspirin daily.    Review of Systems:  Review of Systems   Constitutional: Negative.   Cardiovascular: Negative.    Respiratory: Negative.    Skin:        Right leg: Varicose veins   Musculoskeletal: Negative.    Gastrointestinal: Negative.    Neurological: Negative.    Psychiatric/Behavioral: Negative.        I have reviewed the following portions of the patient's history: allergies, current medications, past family history, past medical history, past social history, past surgical history and problem list and confirm it's accurate.    Allergies:  Allergies   Allergen Reactions   • Latex Unknown - Low Severity       Medications:      Current Outpatient Medications:   •  amLODIPine (NORVASC) 10 MG tablet, amlodipine 10 mg oral tablet take 1 tablet (10 mg) by oral route once daily   Active, Disp: , Rfl:   •  aspirin (aspirin) 81 MG EC tablet, Aspir-81 81 mg oral tablet,delayed release (DR/EC) take 1 tablet (81 mg) by oral route once daily   Active, Disp: , Rfl:   •  benazepril (LOTENSIN) 40 MG tablet,  benazepril 40 mg oral tablet take 1 tablet (40 mg) by oral route once daily   Active, Disp: , Rfl:   •  chlorthalidone (HYGROTON) 25 MG tablet, chlorthalidone 25 mg oral tablet take 1 tablet (25 mg) by oral route once daily   Active, Disp: , Rfl:   •  cholecalciferol (VITAMIN D3) 25 MCG (1000 UT) tablet, Take 1,000 Units by mouth Daily., Disp: , Rfl:   •  clopidogrel (Plavix) 75 MG tablet, Plavix 75 mg oral tablet take 1 tablet (75 mg) by oral route once daily   Active, Disp: , Rfl:   •  metoprolol tartrate (LOPRESSOR) 25 MG tablet, metoprolol tartrate 25 mg oral tablet take 1 tablet (25 mg) by oral route 2 times per day   Active, Disp: , Rfl:   •  oxybutynin XL (DITROPAN-XL) 10 MG 24 hr tablet, , Disp: , Rfl:   •  pravastatin (PRAVACHOL) 20 MG tablet, pravastatin 20 mg oral tablet take 1 tablet (20 mg) by oral route once daily at bedtime   Active, Disp: , Rfl:   •  Probiotic Product (PROBIOTIC-10 PO), Take  by mouth., Disp: , Rfl:   •  sertraline (Zoloft) 100 MG tablet, Zoloft 100 mg oral tablet take 1 tablet (100 mg) by oral route once daily   Active, Disp: , Rfl:     History:   Past Medical History:   Diagnosis Date   • Colon polyp    • Crohn's disease (HCC)    • Hyperlipidemia        Past Surgical History:   Procedure Laterality Date   • CHOLECYSTECTOMY     • COLONOSCOPY     • HEMORRHOIDECTOMY         Social History     Socioeconomic History   • Marital status:    • Number of children: 3   Tobacco Use   • Smoking status: Never Smoker   • Smokeless tobacco: Never Used   Vaping Use   • Vaping Use: Never used   Substance and Sexual Activity   • Alcohol use: Not Currently   • Drug use: Never   • Sexual activity: Not Currently     Birth control/protection: Post-menopausal        Family History   Problem Relation Age of Onset   • Colon cancer Cousin    • Diabetes Daughter    • Hypertension Daughter    • Hypertension Son        Objective     Physical Exam:  Vitals:    02/08/22 1059   BP: 140/78   BP Location:  Left arm   Patient Position: Sitting   Cuff Size: Adult   Pulse: 54   Resp: 16   Temp: 97.6 °F (36.4 °C)   TempSrc: Temporal   SpO2: 97%      There is no height or weight on file to calculate BMI.    Physical Exam  Constitutional:       Appearance: Normal appearance.   HENT:      Head: Normocephalic.   Cardiovascular:      Rate and Rhythm: Normal rate.      Pulses: Normal pulses.      Comments: Bilateral lower extremities: +2 palpable dorsalis pedis and posterior tibial pulses.  Musculoskeletal:      Cervical back: Normal range of motion.   Skin:     General: Skin is warm and dry.      Capillary Refill: Capillary refill takes less than 2 seconds.      Comments: Right lower extremity:    Neurological:      General: No focal deficit present.      Mental Status: She is alert and oriented to person, place, and time.   Psychiatric:         Mood and Affect: Mood normal.         Behavior: Behavior normal.         Imaging/Labs:    I have reviewed the results of the venous duplex performed on 12/07/2021 and the previous one on 08/25/2021.  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        Assessment / Plan      Assessment / Plan:  Diagnoses and all orders for this visit:    1. Phlebitis and thrombophlebitis (Primary)       Ms. Pires has right leg superficial thrombophlebitis however, she is managing it appropriately at this time.  She is wearing 20 to 30 mm/Hg thigh-high compression stockings, staying active and elevating when possible.  She has strong palpable dorsalis pedis and posterior tibial pulses.  I recommend she continue with conservative medical management and do not recommend any vascular interventions at this time.  We have had a long discussion about compression therapy and the benefits, I answered all her questions and she is in agreement with the plan at this time.  She may follow-up as needed.    Patient Education: Compression Therapy      Follow Up:    No follow-ups on file.   Or sooner for any further concerns or worsening sign and symptoms. If unable to reach us in the office please dial 911 or go to the nearest emergency department.      ROCIO Saez  [facility]

## 2022-02-23 ENCOUNTER — LAB (OUTPATIENT)
Dept: LAB | Facility: HOSPITAL | Age: 87
End: 2022-02-23

## 2022-02-23 ENCOUNTER — TRANSCRIBE ORDERS (OUTPATIENT)
Dept: ADMINISTRATIVE | Facility: HOSPITAL | Age: 87
End: 2022-02-23

## 2022-02-23 DIAGNOSIS — E78.5 HYPERLIPIDEMIA, UNSPECIFIED HYPERLIPIDEMIA TYPE: ICD-10-CM

## 2022-02-23 DIAGNOSIS — E78.5 HYPERLIPIDEMIA, UNSPECIFIED HYPERLIPIDEMIA TYPE: Primary | ICD-10-CM

## 2022-02-23 LAB
ALBUMIN SERPL-MCNC: 4.3 G/DL (ref 3.5–5.2)
ALBUMIN/GLOB SERPL: 2 G/DL
ALP SERPL-CCNC: 91 U/L (ref 39–117)
ALT SERPL W P-5'-P-CCNC: 11 U/L (ref 1–33)
ANION GAP SERPL CALCULATED.3IONS-SCNC: 10 MMOL/L (ref 5–15)
AST SERPL-CCNC: 21 U/L (ref 1–32)
BILIRUB SERPL-MCNC: 0.6 MG/DL (ref 0–1.2)
BUN SERPL-MCNC: 20 MG/DL (ref 8–23)
BUN/CREAT SERPL: 24.7 (ref 7–25)
CALCIUM SPEC-SCNC: 9.5 MG/DL (ref 8.6–10.5)
CHLORIDE SERPL-SCNC: 103 MMOL/L (ref 98–107)
CO2 SERPL-SCNC: 30 MMOL/L (ref 22–29)
CREAT SERPL-MCNC: 0.81 MG/DL (ref 0.57–1)
GFR SERPL CREATININE-BSD FRML MDRD: 67 ML/MIN/1.73
GLOBULIN UR ELPH-MCNC: 2.2 GM/DL
GLUCOSE SERPL-MCNC: 88 MG/DL (ref 65–99)
POTASSIUM SERPL-SCNC: 3.5 MMOL/L (ref 3.5–5.2)
PROT SERPL-MCNC: 6.5 G/DL (ref 6–8.5)
SODIUM SERPL-SCNC: 143 MMOL/L (ref 136–145)

## 2022-02-23 PROCEDURE — 80053 COMPREHEN METABOLIC PANEL: CPT

## 2022-02-23 PROCEDURE — 36415 COLL VENOUS BLD VENIPUNCTURE: CPT

## 2022-03-09 ENCOUNTER — LAB (OUTPATIENT)
Dept: LAB | Facility: HOSPITAL | Age: 87
End: 2022-03-09

## 2022-03-09 ENCOUNTER — OFFICE VISIT (OUTPATIENT)
Dept: FAMILY MEDICINE CLINIC | Facility: CLINIC | Age: 87
End: 2022-03-09

## 2022-03-09 VITALS
RESPIRATION RATE: 17 BRPM | OXYGEN SATURATION: 94 % | WEIGHT: 166 LBS | DIASTOLIC BLOOD PRESSURE: 68 MMHG | BODY MASS INDEX: 30.55 KG/M2 | HEIGHT: 62 IN | SYSTOLIC BLOOD PRESSURE: 132 MMHG | HEART RATE: 61 BPM | TEMPERATURE: 97.4 F

## 2022-03-09 DIAGNOSIS — F32.5 MAJOR DEPRESSIVE DISORDER WITH SINGLE EPISODE, IN FULL REMISSION: ICD-10-CM

## 2022-03-09 DIAGNOSIS — E78.5 HYPERLIPIDEMIA, UNSPECIFIED HYPERLIPIDEMIA TYPE: ICD-10-CM

## 2022-03-09 DIAGNOSIS — E11.9 TYPE 2 DIABETES MELLITUS WITHOUT COMPLICATION, WITHOUT LONG-TERM CURRENT USE OF INSULIN: ICD-10-CM

## 2022-03-09 DIAGNOSIS — I10 ESSENTIAL HYPERTENSION: Primary | ICD-10-CM

## 2022-03-09 DIAGNOSIS — K50.90 CROHN'S DISEASE WITHOUT COMPLICATION, UNSPECIFIED GASTROINTESTINAL TRACT LOCATION: ICD-10-CM

## 2022-03-09 DIAGNOSIS — N39.42 URINARY INCONTINENCE WITHOUT SENSORY AWARENESS: ICD-10-CM

## 2022-03-09 LAB
CHOLEST SERPL-MCNC: 164 MG/DL (ref 0–200)
CRP SERPL-MCNC: <0.3 MG/DL (ref 0–0.5)
ERYTHROCYTE [SEDIMENTATION RATE] IN BLOOD: 5 MM/HR (ref 0–30)
HBA1C MFR BLD: 5.6 % (ref 4.8–5.6)
HDLC SERPL-MCNC: 65 MG/DL (ref 40–60)
LDLC SERPL CALC-MCNC: 82 MG/DL (ref 0–100)
LDLC/HDLC SERPL: 1.24 {RATIO}
TRIGL SERPL-MCNC: 91 MG/DL (ref 0–150)
VLDLC SERPL-MCNC: 17 MG/DL (ref 5–40)

## 2022-03-09 PROCEDURE — 99204 OFFICE O/P NEW MOD 45 MIN: CPT | Performed by: FAMILY MEDICINE

## 2022-03-09 PROCEDURE — 85652 RBC SED RATE AUTOMATED: CPT | Performed by: FAMILY MEDICINE

## 2022-03-09 PROCEDURE — 83036 HEMOGLOBIN GLYCOSYLATED A1C: CPT | Performed by: FAMILY MEDICINE

## 2022-03-09 PROCEDURE — 80061 LIPID PANEL: CPT | Performed by: FAMILY MEDICINE

## 2022-03-09 PROCEDURE — 86140 C-REACTIVE PROTEIN: CPT | Performed by: FAMILY MEDICINE

## 2022-03-09 RX ORDER — AMLODIPINE BESYLATE 10 MG/1
10 TABLET ORAL DAILY
Qty: 90 TABLET | Refills: 3 | Status: SHIPPED | OUTPATIENT
Start: 2022-03-09 | End: 2022-06-07

## 2022-03-09 RX ORDER — PRAVASTATIN SODIUM 20 MG
20 TABLET ORAL NIGHTLY
Qty: 90 TABLET | Refills: 3 | Status: SHIPPED | OUTPATIENT
Start: 2022-03-09 | End: 2023-03-16 | Stop reason: SDUPTHER

## 2022-03-09 RX ORDER — BENAZEPRIL HYDROCHLORIDE 40 MG/1
40 TABLET, FILM COATED ORAL DAILY
Qty: 90 TABLET | Refills: 3 | Status: SHIPPED | OUTPATIENT
Start: 2022-03-09 | End: 2022-06-07

## 2022-03-09 RX ORDER — CHLORTHALIDONE 25 MG/1
25 TABLET ORAL DAILY
Qty: 90 TABLET | Refills: 3 | Status: SHIPPED | OUTPATIENT
Start: 2022-03-09 | End: 2023-02-24

## 2022-03-09 RX ORDER — SERTRALINE HYDROCHLORIDE 100 MG/1
100 TABLET, FILM COATED ORAL DAILY
Qty: 90 TABLET | Refills: 3 | Status: SHIPPED | OUTPATIENT
Start: 2022-03-09 | End: 2023-04-04 | Stop reason: SDUPTHER

## 2022-03-09 RX ORDER — CLOPIDOGREL BISULFATE 75 MG/1
75 TABLET ORAL DAILY
Qty: 90 TABLET | Refills: 3 | Status: SHIPPED | OUTPATIENT
Start: 2022-03-09 | End: 2022-06-07

## 2022-03-09 NOTE — ASSESSMENT & PLAN NOTE
Her history of diabetes is rather unclear.  I am not really sure that study stenosis is actually correct.  Check an A1c for some clarification.

## 2022-03-09 NOTE — PROGRESS NOTES
Chief Complaint   Patient presents with   • Establish Care     New Patient         Subjective     Kristine Rahman  has a past medical history of Crohn's disease (HCC).    Establish care-she states her previous provider does not take her insurance.  She takes changed healthcare plans at the beginning of this year and thus needed to find a provider within her plan.    Type 2 diabetes-I am not really sure of this diagnosis.  It was in her records.  The patient is unable to really give me a clear history whether she truly was ever diagnosed as a diabetic or not.  Recent lab work does not even include an A1c.  And she does not really check her blood sugar outside the office nor she on any diabetic medication.    Hypertension-she does not check her blood pressure outside the office.  Her blood pressure here today is elevated at 164/58.    Hyperlipidemia-she states she takes her pravastatin on a nightly basis.    Depression.  She states her depression is doing well.  She takes her sertraline on a regular basis.    Urinary incontinence-currently she does oxybutynin.  She states her symptoms are much worse at nighttime.  She states the oxybutynin has not made any improvement in her urinary symptoms whatsoever.      PHQ-2 Depression Screening  Little interest or pleasure in doing things?     Feeling down, depressed, or hopeless?     PHQ-2 Total Score     PHQ-9 Depression Screening  Little interest or pleasure in doing things?     Feeling down, depressed, or hopeless?     Trouble falling or staying asleep, or sleeping too much?     Feeling tired or having little energy?     Poor appetite or overeating?     Feeling bad about yourself - or that you are a failure or have let yourself or your family down?     Trouble concentrating on things, such as reading the newspaper or watching television?     Moving or speaking so slowly that other people could have noticed? Or the opposite - being so fidgety or restless that you have been  moving around a lot more than usual?     Thoughts that you would be better off dead, or of hurting yourself in some way?     PHQ-9 Total Score     If you checked off any problems, how difficult have these problems made it for you to do your work, take care of things at home, or get along with other people?       Allergies   Allergen Reactions   • Latex Unknown - Low Severity       Prior to Admission medications    Medication Sig Start Date End Date Taking? Authorizing Provider   amLODIPine (NORVASC) 10 MG tablet amlodipine 10 mg oral tablet take 1 tablet (10 mg) by oral route once daily   Active   Yes ProviderVane MD   aspirin 81 MG EC tablet Aspir-81 81 mg oral tablet,delayed release (DR/EC) take 1 tablet (81 mg) by oral route once daily   Active   Yes ProviderVane MD   benazepril (LOTENSIN) 40 MG tablet benazepril 40 mg oral tablet take 1 tablet (40 mg) by oral route once daily   Active   Yes ProviderVane MD   chlorthalidone (HYGROTON) 25 MG tablet chlorthalidone 25 mg oral tablet take 1 tablet (25 mg) by oral route once daily   Active   Yes Provider, MD Vane   cholecalciferol (VITAMIN D3) 25 MCG (1000 UT) tablet Take 1,000 Units by mouth Daily.   Yes Provider, MD Vane   clopidogrel (PLAVIX) 75 MG tablet Plavix 75 mg oral tablet take 1 tablet (75 mg) by oral route once daily   Active   Yes Provider, MD Vane   metoprolol tartrate (LOPRESSOR) 25 MG tablet metoprolol tartrate 25 mg oral tablet take 1 tablet (25 mg) by oral route 2 times per day   Active   Yes ProviderVane MD   oxybutynin XL (DITROPAN-XL) 10 MG 24 hr tablet  11/24/21  Yes Provider, Historical, MD   pravastatin (PRAVACHOL) 20 MG tablet pravastatin 20 mg oral tablet take 1 tablet (20 mg) by oral route once daily at bedtime   Active   Yes ProviderVane MD   Probiotic Product (PROBIOTIC-10 PO) Take  by mouth.   Yes Provider, MD Vane   sertraline (ZOLOFT) 100 MG tablet Zoloft 100 mg  oral tablet take 1 tablet (100 mg) by oral route once daily   Active   Yes Provider, Historical, MD        Patient Active Problem List   Diagnosis   • Anxiety   • Arthritis   • Asthma   • Colon polyps   • Crohn's disease (HCC)   • Depression   • Diabetes (HCC)   • Diverticulitis   • Essential hypertension   • Gastric ulcer   • Hyperlipemia   • Seasonal allergic rhinitis   • Shortness of breath   • Ulcerative lesion   • Urinary incontinence without sensory awareness        Past Surgical History:   Procedure Laterality Date   • CHOLECYSTECTOMY     • COLONOSCOPY     • HEMORRHOIDECTOMY         Social History     Socioeconomic History   • Marital status:    • Number of children: 3   Tobacco Use   • Smoking status: Never Smoker   • Smokeless tobacco: Never Used   Vaping Use   • Vaping Use: Never used   Substance and Sexual Activity   • Alcohol use: Not Currently   • Drug use: Never   • Sexual activity: Not Currently     Birth control/protection: Post-menopausal       Family History   Problem Relation Age of Onset   • Colon cancer Cousin    • Diabetes Daughter    • Hypertension Daughter    • Hypertension Son        Family history, surgical history, past medical history, Allergies and med's reviewed with patient today and updated in [facility] EMR.     ROS:  Review of Systems   Constitutional: Negative for fatigue.   HENT: Negative for congestion, postnasal drip and rhinorrhea.    Eyes: Negative for blurred vision and visual disturbance.   Respiratory: Negative for cough, chest tightness, shortness of breath and wheezing.    Cardiovascular: Negative for chest pain and palpitations.   Gastrointestinal: Negative for abdominal pain, constipation and diarrhea.   Endocrine: Negative for polydipsia and polyuria.   Genitourinary: Positive for urinary incontinence.   Neurological: Negative for headache.   Psychiatric/Behavioral: Negative for depressed mood. The patient is not nervous/anxious.        OBJECTIVE:  Vitals:    03/09/22  "1350 03/09/22 1418   BP: 164/58 132/68   BP Location: Left arm Left arm   Patient Position: Sitting Sitting   Cuff Size: Adult Adult   Pulse: 61    Resp: 17    Temp: 97.4 °F (36.3 °C)    TempSrc: Temporal    SpO2: 94%    Weight: 75.3 kg (166 lb)    Height: 158.1 cm (62.25\")      No exam data present   Body mass index is 30.12 kg/m².  No LMP recorded. Patient is postmenopausal.    Physical Exam  Vitals and nursing note reviewed.   Constitutional:       General: She is not in acute distress.     Appearance: Normal appearance.   HENT:      Head: Normocephalic.      Right Ear: Tympanic membrane, ear canal and external ear normal.      Left Ear: Tympanic membrane, ear canal and external ear normal.      Nose: Nose normal.      Mouth/Throat:      Mouth: Mucous membranes are moist.      Pharynx: Oropharynx is clear.   Eyes:      General: No scleral icterus.     Conjunctiva/sclera: Conjunctivae normal.      Pupils: Pupils are equal, round, and reactive to light.   Cardiovascular:      Rate and Rhythm: Normal rate and regular rhythm.      Pulses: Normal pulses.      Heart sounds: Normal heart sounds. No murmur heard.  Pulmonary:      Effort: Pulmonary effort is normal.      Breath sounds: Normal breath sounds. No wheezing, rhonchi or rales.   Musculoskeletal:      Cervical back: No rigidity or tenderness.   Lymphadenopathy:      Cervical: No cervical adenopathy.   Skin:     General: Skin is warm and dry.      Coloration: Skin is not jaundiced.      Findings: No rash.   Neurological:      General: No focal deficit present.      Mental Status: She is alert and oriented to person, place, and time.   Psychiatric:         Mood and Affect: Mood normal.         Thought Content: Thought content normal.         Judgment: Judgment normal.         Procedures    Lab on 02/23/2022   Component Date Value Ref Range Status   • Glucose 02/23/2022 88  65 - 99 mg/dL Final   • BUN 02/23/2022 20  8 - 23 mg/dL Final   • Creatinine 02/23/2022 0.81 "  0.57 - 1.00 mg/dL Final   • Sodium 02/23/2022 143  136 - 145 mmol/L Final   • Potassium 02/23/2022 3.5  3.5 - 5.2 mmol/L Final   • Chloride 02/23/2022 103  98 - 107 mmol/L Final   • CO2 02/23/2022 30.0 (A) 22.0 - 29.0 mmol/L Final   • Calcium 02/23/2022 9.5  8.6 - 10.5 mg/dL Final   • Total Protein 02/23/2022 6.5  6.0 - 8.5 g/dL Final   • Albumin 02/23/2022 4.30  3.50 - 5.20 g/dL Final   • ALT (SGPT) 02/23/2022 11  1 - 33 U/L Final   • AST (SGOT) 02/23/2022 21  1 - 32 U/L Final   • Alkaline Phosphatase 02/23/2022 91  39 - 117 U/L Final   • Total Bilirubin 02/23/2022 0.6  0.0 - 1.2 mg/dL Final   • eGFR Non  Amer 02/23/2022 67  >60 mL/min/1.73 Final   • Globulin 02/23/2022 2.2  gm/dL Final   • A/G Ratio 02/23/2022 2.0  g/dL Final   • BUN/Creatinine Ratio 02/23/2022 24.7  7.0 - 25.0 Final   • Anion Gap 02/23/2022 10.0  5.0 - 15.0 mmol/L Final       ASSESSMENT/ PLAN:    Diagnoses and all orders for this visit:    1. Essential hypertension (Primary)  Assessment & Plan:  Her blood  pressure is somewhat elevated here today.  We will go ahead and recheck first before adjusting her medication.    Orders:  -     Lipid Panel    2. Hyperlipidemia, unspecified hyperlipidemia type  Assessment & Plan:  Her recent labs only included a CMP.  Go ahead and check a lipid profile.    Orders:  -     Lipid Panel    3. Type 2 diabetes mellitus without complication, without long-term current use of insulin (HCC)  Assessment & Plan:  Her history of diabetes is rather unclear.  I am not really sure that study stenosis is actually correct.  Check an A1c for some clarification.    Orders:  -     Lipid Panel  -     Hemoglobin A1c    4. Major depressive disorder with single episode, in full remission (HCC)  Assessment & Plan:  She states her depression is well controlled with her current medication.      5. Crohn's disease without complication, unspecified gastrointestinal tract location (HCC)  Assessment & Plan:  The diagnosis of Crohn's  is in her chart I am not sure where this diagnosis originated from.  There is a note from gastroenterology but no mention of Crohn's disease.  The patient is unaware of any previous diagnosis of Crohn's disease.  Thus this diagnosis also was is of unclear validity.  She does have known diverticular disease though.    Orders:  -     C-reactive Protein  -     Sedimentation Rate    6. Urinary incontinence without sensory awareness  Assessment & Plan:  She is currently on oxybutynin without any benefit.  We will give her a trial of Myrbetriq to see if it will help any further.      Other orders  -     sertraline (ZOLOFT) 100 MG tablet; Take 1 tablet by mouth Daily for 90 days.  Dispense: 90 tablet; Refill: 3  -     pravastatin (PRAVACHOL) 20 MG tablet; Take 1 tablet by mouth Every Night for 90 days.  Dispense: 90 tablet; Refill: 3  -     Mirabegron ER (Myrbetriq) 50 MG tablet sustained-release 24 hour 24 hr tablet; Take 50 mg by mouth Daily for 90 days.  Dispense: 90 tablet; Refill: 3  -     metoprolol tartrate (LOPRESSOR) 25 MG tablet; Take 1 tablet by mouth 2 (Two) Times a Day for 90 days.  Dispense: 180 tablet; Refill: 3  -     clopidogrel (PLAVIX) 75 MG tablet; Take 1 tablet by mouth Daily for 90 days.  Dispense: 90 tablet; Refill: 3  -     chlorthalidone (HYGROTON) 25 MG tablet; Take 1 tablet by mouth Daily for 90 days.  Dispense: 90 tablet; Refill: 3  -     benazepril (LOTENSIN) 40 MG tablet; Take 1 tablet by mouth Daily for 90 days.  Dispense: 90 tablet; Refill: 3  -     amLODIPine (NORVASC) 10 MG tablet; Take 1 tablet by mouth Daily for 90 days.  Dispense: 90 tablet; Refill: 3      Orders Placed Today:     New Medications Ordered This Visit   Medications   • sertraline (ZOLOFT) 100 MG tablet     Sig: Take 1 tablet by mouth Daily for 90 days.     Dispense:  90 tablet     Refill:  3   • pravastatin (PRAVACHOL) 20 MG tablet     Sig: Take 1 tablet by mouth Every Night for 90 days.     Dispense:  90 tablet      Refill:  3   • Mirabegron ER (Myrbetriq) 50 MG tablet sustained-release 24 hour 24 hr tablet     Sig: Take 50 mg by mouth Daily for 90 days.     Dispense:  90 tablet     Refill:  3   • metoprolol tartrate (LOPRESSOR) 25 MG tablet     Sig: Take 1 tablet by mouth 2 (Two) Times a Day for 90 days.     Dispense:  180 tablet     Refill:  3   • clopidogrel (PLAVIX) 75 MG tablet     Sig: Take 1 tablet by mouth Daily for 90 days.     Dispense:  90 tablet     Refill:  3   • chlorthalidone (HYGROTON) 25 MG tablet     Sig: Take 1 tablet by mouth Daily for 90 days.     Dispense:  90 tablet     Refill:  3   • benazepril (LOTENSIN) 40 MG tablet     Sig: Take 1 tablet by mouth Daily for 90 days.     Dispense:  90 tablet     Refill:  3   • amLODIPine (NORVASC) 10 MG tablet     Sig: Take 1 tablet by mouth Daily for 90 days.     Dispense:  90 tablet     Refill:  3        Management Plan:     An After Visit Summary was printed and given to the patient at discharge.    Follow-up: Return in about 4 months (around 7/9/2022) for Recheck.    Sameer Garsia DO 3/9/2022 14:32 EST  This note was electronically signed.

## 2022-03-09 NOTE — ASSESSMENT & PLAN NOTE
She is currently on oxybutynin without any benefit.  We will give her a trial of Myrbetriq to see if it will help any further.

## 2022-03-09 NOTE — ASSESSMENT & PLAN NOTE
The diagnosis of Crohn's is in her chart I am not sure where this diagnosis originated from.  There is a note from gastroenterology but no mention of Crohn's disease.  The patient is unaware of any previous diagnosis of Crohn's disease.  Thus this diagnosis also was is of unclear validity.  She does have known diverticular disease though.

## 2022-03-10 ENCOUNTER — TELEPHONE (OUTPATIENT)
Dept: FAMILY MEDICINE CLINIC | Facility: CLINIC | Age: 87
End: 2022-03-10

## 2022-03-10 NOTE — TELEPHONE ENCOUNTER
Caller: Kristine Rahman    Relationship: Self    Best call back number:[phone]    Requested Prescriptions:   Requested Prescriptions      No prescriptions requested or ordered in this encounter        Pharmacy where request should be sent: [facility] - [place] [address] - [phone]  - [phone] FX     Additional details provided by patient: PATIENT WAS SEEN YESTERDAY IN OFFICE AND WAS PRESCRIBED A NEW MEDICATION FOR HER BLADDER SINCE THE OLD ONE WAS NOT WORKING. OXYBUTIN. PATIENT WAS TOLD BY THE HOME DELIVERY PHARMACY THAT THE NEW MEDICATION Mirabegron ER (Myrbetriq) 50 MG tablet WAS NOT COVERED UNDER HER PLAN. IT WOULD COST HER $120. IF THERE ARE SAMPLES AVAILABLE OR IF THERE IS  ANOTHER MEDICATION THAT COULD BE CALLED IN INSTEAD THAT WOULD NOT BE AS EXPENSIVE SHE WOULD PREFER ANOTHER ONE CALLED IN. PLEASE CALL PATIENT BACK AND ADVISE.    PATIENT ALSO STATED AT THE END OF THE CALL THAT SHE [facility] T[facility] THAT SHE HAD A DEDUCTIBLE SHE WOULD HAVE TO PAY $125. THIS MAY BE WHAT SHE IS TALKING ABOUT. AT THE END OF THE CALL SHE SAID AFTER SHE PAID THE DEDUCTIBLE THE MYRBETRIQ WOULD BE CHEAPER.    PLEASE CALL PATIENT BACK AND ADVISE OF HER OPTIONS.    Does the patient have less than a 3 day supply:  [] Yes  [x] No    Vivek Hale Rep   03/10/22 10:08 EST

## 2022-03-21 NOTE — TELEPHONE ENCOUNTER
After this lengthy note so does she want to Myrbetriq or not?  Given her age I would not prescribe the oxybutynin as it follows on the beers list.  These are drugs to avoid in the elderly.  She should either have Myrbetriq or Gemtesa.

## 2022-03-24 NOTE — TELEPHONE ENCOUNTER
Called and spoke with pt- I advised her to call the number on the back of her insurance card and ask which medication they prefer and we would sent it in when she lets us know.

## 2022-05-23 ENCOUNTER — TELEPHONE (OUTPATIENT)
Dept: FAMILY MEDICINE CLINIC | Facility: CLINIC | Age: 87
End: 2022-05-23

## 2022-05-23 DIAGNOSIS — R32 URINARY INCONTINENCE, UNSPECIFIED TYPE: Primary | ICD-10-CM

## 2022-05-23 NOTE — TELEPHONE ENCOUNTER
Unfortunately there are not many other options other than medication.  She could always see a urologist but at her age I do not think they would contemplate any type of surgical intervention.  We can refer her to urology if she would like.

## 2022-05-23 NOTE — TELEPHONE ENCOUNTER
Caller: Kristine Rahman    Relationship: Self    Best call back number: [phone]     What is the medical concern/diagnosis: BOTH RIGHT AND LEFT FOOT DISCOMFORT     What specialty or service is being requested: PODIATRISTS    What is the office location:[facility]

## 2022-05-23 NOTE — TELEPHONE ENCOUNTER
Caller: Kristine Rahman    Relationship: Self    Best call back number: [phone]    What is the best time to reach you: ANY     Who are you requesting to speak with CLINICAL     What was the call regarding: PATIENT WOULD LIKE A CALL BACK REGARDING MEDICATION     Mirabegron ER (Myrbetriq) 50 MG tablet sustained-release 24 hour 24 hr tablet    PATIENT STATES ITS NOT HELPING     Do you require a callback: YES

## 2022-05-23 NOTE — TELEPHONE ENCOUNTER
Spoke with patient, states she has to get up multiple times in the middle of the night and is having lots of incontinence.

## 2022-05-24 DIAGNOSIS — M79.671 RIGHT FOOT PAIN: ICD-10-CM

## 2022-05-24 DIAGNOSIS — M79.672 LEFT FOOT PAIN: Primary | ICD-10-CM

## 2022-06-06 NOTE — TELEPHONE ENCOUNTER
Caller: Kristine Rahman    Relationship: Self    Best call back number: [phone]     Who are you requesting to speak with (clinical staff, provider,  specific staff member): MEDICAL STAFF    What was the call regarding: PATIENT IS TAKING THE MEDICATION AND IT IS NOT HELPING. SHE WOULD LIKE TO KNOW IF SHE NEEDS TO CONTINUE TAKING IT. SHE HAS GOTTEN A NOTIFICATION THAT A REFILL IS AVAILABLE AND WOULD LIKE TO KNOW IF SHE NEEDS TO PICK IT UP.

## 2022-07-11 ENCOUNTER — OFFICE VISIT (OUTPATIENT)
Dept: FAMILY MEDICINE CLINIC | Facility: CLINIC | Age: 87
End: 2022-07-11

## 2022-07-11 VITALS
OXYGEN SATURATION: 97 % | HEIGHT: 62 IN | HEART RATE: 62 BPM | TEMPERATURE: 100.2 F | SYSTOLIC BLOOD PRESSURE: 158 MMHG | DIASTOLIC BLOOD PRESSURE: 62 MMHG | BODY MASS INDEX: 29.4 KG/M2 | WEIGHT: 159.8 LBS

## 2022-07-11 DIAGNOSIS — R05.9 COUGH: ICD-10-CM

## 2022-07-11 DIAGNOSIS — E11.9 TYPE 2 DIABETES MELLITUS WITHOUT COMPLICATION, WITHOUT LONG-TERM CURRENT USE OF INSULIN: ICD-10-CM

## 2022-07-11 DIAGNOSIS — F32.5 MAJOR DEPRESSIVE DISORDER WITH SINGLE EPISODE, IN FULL REMISSION: ICD-10-CM

## 2022-07-11 DIAGNOSIS — E78.5 HYPERLIPIDEMIA, UNSPECIFIED HYPERLIPIDEMIA TYPE: ICD-10-CM

## 2022-07-11 DIAGNOSIS — I10 ESSENTIAL HYPERTENSION: Primary | ICD-10-CM

## 2022-07-11 DIAGNOSIS — U07.1 COVID-19 VIRUS DETECTED: ICD-10-CM

## 2022-07-11 LAB
ANION GAP SERPL CALCULATED.3IONS-SCNC: 11.2 MMOL/L (ref 5–15)
BUN SERPL-MCNC: 32 MG/DL (ref 8–23)
BUN/CREAT SERPL: 34.8 (ref 7–25)
CALCIUM SPEC-SCNC: 9.8 MG/DL (ref 8.6–10.5)
CHLORIDE SERPL-SCNC: 102 MMOL/L (ref 98–107)
CO2 SERPL-SCNC: 28.8 MMOL/L (ref 22–29)
CREAT SERPL-MCNC: 0.92 MG/DL (ref 0.57–1)
EGFRCR SERPLBLD CKD-EPI 2021: 61.5 ML/MIN/1.73
EXPIRATION DATE: ABNORMAL
GLUCOSE SERPL-MCNC: 95 MG/DL (ref 65–99)
INTERNAL CONTROL: ABNORMAL
Lab: ABNORMAL
POTASSIUM SERPL-SCNC: 4.1 MMOL/L (ref 3.5–5.2)
SARS-COV-2 AG UPPER RESP QL IA.RAPID: DETECTED
SODIUM SERPL-SCNC: 142 MMOL/L (ref 136–145)

## 2022-07-11 PROCEDURE — 87426 SARSCOV CORONAVIRUS AG IA: CPT | Performed by: FAMILY MEDICINE

## 2022-07-11 PROCEDURE — 99214 OFFICE O/P EST MOD 30 MIN: CPT | Performed by: FAMILY MEDICINE

## 2022-07-11 PROCEDURE — 80048 BASIC METABOLIC PNL TOTAL CA: CPT | Performed by: FAMILY MEDICINE

## 2022-07-11 RX ORDER — BENAZEPRIL HYDROCHLORIDE 40 MG/1
40 TABLET, FILM COATED ORAL DAILY
COMMUNITY
End: 2023-04-04 | Stop reason: SDUPTHER

## 2022-07-11 RX ORDER — AMLODIPINE BESYLATE 10 MG/1
10 TABLET ORAL DAILY
COMMUNITY
End: 2023-04-04 | Stop reason: SDUPTHER

## 2022-07-11 RX ORDER — CLOPIDOGREL BISULFATE 75 MG/1
75 TABLET ORAL DAILY
COMMUNITY
End: 2023-03-16 | Stop reason: SDUPTHER

## 2022-07-11 RX ORDER — METOPROLOL TARTRATE 50 MG/1
50 TABLET, FILM COATED ORAL 2 TIMES DAILY
Qty: 180 TABLET | Refills: 3 | Status: SHIPPED | OUTPATIENT
Start: 2022-07-11 | End: 2023-02-24

## 2022-07-11 RX ORDER — BENAZEPRIL HYDROCHLORIDE 40 MG/1
TABLET, FILM COATED ORAL
COMMUNITY
Start: 2022-07-05 | End: 2022-07-11

## 2022-07-11 NOTE — PROGRESS NOTES
Chief Complaint   Patient presents with   • Follow-up     Pt here for follow up, patient also c/o cough, and nasal drainage x 5 days.        Subjective     Kristine Rahman  has a past medical history of Crohn's disease (HCC).    Type 2 diabetes- she does not check her blood sugars outside the office.  She is not not monitor her diet any longer.  Her last A1c was excellent at 5.6.    Hypertension- she does not check her blood pressure outside the office.  Her initial blood pressure is elevated here today at 160/68.    Hyperlipidemia- she takes her pravastatin on a nightly basis.    Depression- she states her depression is well controlled with her sertraline on a daily basis.    Sinus- she states she has had a lot of nasal congestion postnasal drainage and a cough for about the last 5 days.  She states she did have a fever today.  She has not tried any over-the-counter remedies.      PHQ-2 Depression Screening  Little interest or pleasure in doing things?     Feeling down, depressed, or hopeless?     PHQ-2 Total Score     PHQ-9 Depression Screening  Little interest or pleasure in doing things?     Feeling down, depressed, or hopeless?     Trouble falling or staying asleep, or sleeping too much?     Feeling tired or having little energy?     Poor appetite or overeating?     Feeling bad about yourself - or that you are a failure or have let yourself or your family down?     Trouble concentrating on things, such as reading the newspaper or watching television?     Moving or speaking so slowly that other people could have noticed? Or the opposite - being so fidgety or restless that you have been moving around a lot more than usual?     Thoughts that you would be better off dead, or of hurting yourself in some way?     PHQ-9 Total Score     If you checked off any problems, how difficult have these problems made it for you to do your work, take care of things at home, or get along with other people?       Allergies    Allergen Reactions   • Latex Unknown - Low Severity       Prior to Admission medications    Medication Sig Start Date End Date Taking? Authorizing Provider   amLODIPine (NORVASC) 10 MG tablet Take 10 mg by mouth Daily.   Yes Vane Greenberg MD   aspirin 81 MG EC tablet Aspir-81 81 mg oral tablet,delayed release (DR/EC) take 1 tablet (81 mg) by oral route once daily   Active   Yes Vane Greenberg MD   benazepril (LOTENSIN) 40 MG tablet Take 40 mg by mouth Daily.   Yes Vane Greenberg MD   Calcium Carbonate-Vit D-Min (CALCIUM 1200 PO) Take 1,200 mg by mouth Daily.   Yes Vane Greenberg MD   cholecalciferol (VITAMIN D3) 25 MCG (1000 UT) tablet Take 1,000 Units by mouth Daily.   Yes Vane Greenberg MD   clopidogrel (PLAVIX) 75 MG tablet Take 75 mg by mouth Daily.   Yes Vane Greenberg MD   Mirabegron ER (Myrbetriq) 50 MG tablet sustained-release 24 hour 24 hr tablet Take 50 mg by mouth Daily.   Yes Vane Greenberg MD   Probiotic Product (PROBIOTIC-10 PO) Take  by mouth.   Yes Vane Greenberg MD   chlorthalidone (HYGROTON) 25 MG tablet Take 1 tablet by mouth Daily for 90 days. 3/9/22 6/7/22  Sameer Garsia DO   metoprolol tartrate (LOPRESSOR) 25 MG tablet Take 1 tablet by mouth 2 (Two) Times a Day for 90 days. 3/9/22 6/7/22  Sameer Garsia DO   pravastatin (PRAVACHOL) 20 MG tablet Take 1 tablet by mouth Every Night for 90 days. 3/9/22 6/7/22  Sameer Garsia DO   sertraline (ZOLOFT) 100 MG tablet Take 1 tablet by mouth Daily for 90 days. 3/9/22 6/7/22  Sameer Garsia DO   benazepril (LOTENSIN) 40 MG tablet  7/5/22 7/11/22  ProviderVane MD        Patient Active Problem List   Diagnosis   • Anxiety   • Arthritis   • Asthma   • Colon polyps   • Crohn's disease (HCC)   • Depression   • Diabetes (HCC)   • Diverticulitis   • Essential hypertension   • Gastric ulcer   • Hyperlipemia   • Seasonal allergic rhinitis   • Shortness of  "breath   • Ulcerative lesion   • Urinary incontinence without sensory awareness        Past Surgical History:   Procedure Laterality Date   • CHOLECYSTECTOMY     • COLONOSCOPY     • HEMORRHOIDECTOMY         Social History     Socioeconomic History   • Marital status:    • Number of children: 3   Tobacco Use   • Smoking status: Never Smoker   • Smokeless tobacco: Never Used   Vaping Use   • Vaping Use: Never used   Substance and Sexual Activity   • Alcohol use: Not Currently   • Drug use: Never   • Sexual activity: Not Currently     Birth control/protection: Post-menopausal       Family History   Problem Relation Age of Onset   • Colon cancer Cousin    • Diabetes Daughter    • Hypertension Daughter    • Hypertension Son        Family history, surgical history, past medical history, Allergies and meds reviewed with patient today and updated in [facility] EMR.     ROS:  Review of Systems   Constitutional: Positive for fever. Negative for fatigue.   HENT: Positive for congestion. Negative for postnasal drip and rhinorrhea.    Eyes: Negative for blurred vision and visual disturbance.   Respiratory: Positive for cough. Negative for chest tightness, shortness of breath and wheezing.    Cardiovascular: Negative for chest pain and palpitations.   Endocrine: Negative for polydipsia and polyuria.   Allergic/Immunologic: Negative for environmental allergies.   Neurological: Negative for headache.   Psychiatric/Behavioral: Negative for depressed mood. The patient is not nervous/anxious.        OBJECTIVE:  Vitals:    07/11/22 1149 07/11/22 1228   BP: 160/68 158/62   BP Location: Left arm Left arm   Patient Position: Sitting Sitting   Cuff Size: Adult Adult   Pulse: 62    Temp: 100.2 °F (37.9 °C)    TempSrc: Temporal    SpO2: 97%    Weight: 72.5 kg (159 lb 12.8 oz)    Height: 158.1 cm (62.25\")      No exam data present   Body mass index is 28.99 kg/m².  No LMP recorded. Patient is postmenopausal.    Physical Exam  Vitals and " nursing note reviewed.   Constitutional:       General: She is not in acute distress.     Appearance: Normal appearance. She is normal weight.   HENT:      Head: Normocephalic.      Right Ear: Tympanic membrane, ear canal and external ear normal.      Left Ear: Tympanic membrane, ear canal and external ear normal.      Nose: Nose normal.      Mouth/Throat:      Mouth: Mucous membranes are moist.      Pharynx: Oropharynx is clear.   Eyes:      General: No scleral icterus.     Conjunctiva/sclera: Conjunctivae normal.      Pupils: Pupils are equal, round, and reactive to light.   Cardiovascular:      Rate and Rhythm: Normal rate and regular rhythm.      Pulses: Normal pulses.      Heart sounds: Normal heart sounds. No murmur heard.  Pulmonary:      Effort: Pulmonary effort is normal.      Breath sounds: Normal breath sounds. No wheezing, rhonchi or rales.   Musculoskeletal:      Cervical back: Neck supple. Tenderness present. No deformity or rigidity.   Lymphadenopathy:      Cervical: No cervical adenopathy.   Skin:     General: Skin is warm and dry.      Coloration: Skin is not jaundiced.      Findings: No rash.   Neurological:      General: No focal deficit present.      Mental Status: She is alert and oriented to person, place, and time.   Psychiatric:         Mood and Affect: Mood normal.         Thought Content: Thought content normal.         Judgment: Judgment normal.         Procedures    No visits with results within 30 Day(s) from this visit.   Latest known visit with results is:   Office Visit on 03/09/2022   Component Date Value Ref Range Status   • Total Cholesterol 03/09/2022 164  0 - 200 mg/dL Final   • Triglycerides 03/09/2022 91  0 - 150 mg/dL Final   • HDL Cholesterol 03/09/2022 65 (A) 40 - 60 mg/dL Final   • LDL Cholesterol  03/09/2022 82  0 - 100 mg/dL Final   • VLDL Cholesterol 03/09/2022 17  5 - 40 mg/dL Final   • LDL/HDL Ratio 03/09/2022 1.24   Final   • Hemoglobin A1C 03/09/2022 5.60  4.80 -  5.60 % Final   • C-Reactive Protein 03/09/2022 <0.30  0.00 - 0.50 mg/dL Final   • Sed Rate 03/09/2022 5  0 - 30 mm/hr Final       ASSESSMENT/ PLAN:    Diagnoses and all orders for this visit:    1. Essential hypertension (Primary)  Assessment & Plan:  Her initial blood pressure here today is elevated.  We will go ahead and recheck.  Upon recheck her blood pressure was equally as high.  We will increase her metoprolol to 50 mg twice daily.    Orders:  -     Basic Metabolic Panel    2. Hyperlipidemia, unspecified hyperlipidemia type  Assessment & Plan:  We will hold off on repeating her lipid profile at this time.      3. Type 2 diabetes mellitus without complication, without long-term current use of insulin (HCC)  Assessment & Plan:  Her last A1c was good at 5.6.  She just needs to continue to moderate her carbohydrate intake and stay physically active.    Orders:  -     Basic Metabolic Panel    4. Major depressive disorder with single episode, in full remission (HCC)  Assessment & Plan:  Her depression is well controlled with her current medication.      Other orders  -     metoprolol tartrate (LOPRESSOR) 50 MG tablet; Take 1 tablet by mouth 2 (Two) Times a Day for 90 days.  Dispense: 180 tablet; Refill: 3      Orders Placed Today:     New Medications Ordered This Visit   Medications   • metoprolol tartrate (LOPRESSOR) 50 MG tablet     Sig: Take 1 tablet by mouth 2 (Two) Times a Day for 90 days.     Dispense:  180 tablet     Refill:  3     New dosage        Management Plan:     An After Visit Summary was printed and given to the patient at discharge.    Follow-up: Return in about 3 months (around 10/11/2022) for Recheck.    Sameer Garsia DO 7/11/2022 12:33 EDT  This note was electronically signed.

## 2022-07-11 NOTE — ASSESSMENT & PLAN NOTE
She is COVID-positive.  Given her mild symptoms but given her risk of of underlying health conditions age I would treat her with anti-COVID medication.

## 2022-07-11 NOTE — ASSESSMENT & PLAN NOTE
Her last A1c was good at 5.6.  She just needs to continue to moderate her carbohydrate intake and stay physically active.

## 2022-07-11 NOTE — ASSESSMENT & PLAN NOTE
Her initial blood pressure here today is elevated.  We will go ahead and recheck.  Upon recheck her blood pressure was equally as high.  We will increase her metoprolol to 50 mg twice daily.

## 2022-07-29 ENCOUNTER — OFFICE VISIT (OUTPATIENT)
Dept: PODIATRY | Facility: CLINIC | Age: 87
End: 2022-07-29

## 2022-07-29 VITALS
SYSTOLIC BLOOD PRESSURE: 146 MMHG | HEIGHT: 62 IN | TEMPERATURE: 97.6 F | OXYGEN SATURATION: 97 % | BODY MASS INDEX: 29.08 KG/M2 | HEART RATE: 63 BPM | DIASTOLIC BLOOD PRESSURE: 44 MMHG | WEIGHT: 158 LBS

## 2022-07-29 DIAGNOSIS — L60.0 ONYCHOCRYPTOSIS: ICD-10-CM

## 2022-07-29 DIAGNOSIS — M79.672 FOOT PAIN, BILATERAL: Primary | ICD-10-CM

## 2022-07-29 DIAGNOSIS — M20.41 HAMMER TOES OF BOTH FEET: ICD-10-CM

## 2022-07-29 DIAGNOSIS — M20.42 HAMMER TOES OF BOTH FEET: ICD-10-CM

## 2022-07-29 DIAGNOSIS — M79.671 FOOT PAIN, BILATERAL: Primary | ICD-10-CM

## 2022-07-29 DIAGNOSIS — B35.1 ONYCHOMYCOSIS: ICD-10-CM

## 2022-07-29 PROCEDURE — 11721 DEBRIDE NAIL 6 OR MORE: CPT | Performed by: PODIATRIST

## 2022-07-29 PROCEDURE — 99203 OFFICE O/P NEW LOW 30 MIN: CPT | Performed by: PODIATRIST

## 2022-07-29 NOTE — PROGRESS NOTES
[facility] - PODIATRY    Today's Date: 07/29/22    Patient Name: Kristine Rahman  MRN: [id number]  CSN: [id number]  PCP: Sameer Garsia DO, Last PCP Visit:  7/11/2022  Referring Provider: Sameer Garsia*    SUBJECTIVE     Chief Complaint   Patient presents with   • Left Foot - Establish Care, Hammer Toe, Pain   • Right Foot - Establish Care, Hammer Toe, Pain     HPI: Kristine Rahman, a 86 y.o.female, comes to clinic.    New, Established, New Problem:  New  Location:  Toenails  Duration:   Greater than five years  Onset:  Gradual  Nature:  sore with palpation.  Stable, worsening, improving:   worsening  Aggravating factors:  Pain with shoe gear and ambulation.  Previous Treatment: Unable to trim their own toenails.    No other pedal complaints at this time.    Patient denies any fevers, chills, nausea, vomiting, shortness of breath, nor any other constitutional signs nor symptoms.       Past Medical History:   Diagnosis Date   • Anxiety    • Arthritis    • Asthma    • Crohn's disease (HCC)    • Depression    • Diabetes mellitus (HCC)    • Diverticulitis    • Gastric ulcer    • Hammer toe    • HLD (hyperlipidemia)    • Hypertension    • Seasonal allergies    • Urinary incontinence      Past Surgical History:   Procedure Laterality Date   • CHOLECYSTECTOMY     • COLONOSCOPY     • HEMORRHOIDECTOMY       Family History   Problem Relation Age of Onset   • Colon cancer Cousin    • Diabetes Daughter    • Hypertension Daughter    • Hypertension Son      Social History     Socioeconomic History   • Marital status:    • Number of children: 3   Tobacco Use   • Smoking status: Never Smoker   • Smokeless tobacco: Never Used   Vaping Use   • Vaping Use: Never used   Substance and Sexual Activity   • Alcohol use: Not Currently   • Drug use: Never   • Sexual activity: Not Currently     Birth control/protection: Post-menopausal     Allergies   Allergen Reactions   • Latex Unknown - Low  Severity     Current Outpatient Medications   Medication Sig Dispense Refill   • amLODIPine (NORVASC) 10 MG tablet Take 10 mg by mouth Daily.     • aspirin 81 MG EC tablet Aspir-81 81 mg oral tablet,delayed release (DR/EC) take 1 tablet (81 mg) by oral route once daily   Active     • benazepril (LOTENSIN) 40 MG tablet Take 40 mg by mouth Daily.     • Calcium Carbonate-Vit D-Min (CALCIUM 1200 PO) Take 1,200 mg by mouth Daily.     • cholecalciferol (VITAMIN D3) 25 MCG (1000 UT) tablet Take 1,000 Units by mouth Daily.     • clopidogrel (PLAVIX) 75 MG tablet Take 75 mg by mouth Daily.     • metoprolol tartrate (LOPRESSOR) 50 MG tablet Take 1 tablet by mouth 2 (Two) Times a Day for 90 days. 180 tablet 3   • Mirabegron ER (MYRBETRIQ) 50 MG tablet sustained-release 24 hour 24 hr tablet Take 50 mg by mouth Daily.     • Probiotic Product (PROBIOTIC-10 PO) Take  by mouth.     • chlorthalidone (HYGROTON) 25 MG tablet Take 1 tablet by mouth Daily for 90 days. 90 tablet 3   • pravastatin (PRAVACHOL) 20 MG tablet Take 1 tablet by mouth Every Night for 90 days. 90 tablet 3   • sertraline (ZOLOFT) 100 MG tablet Take 1 tablet by mouth Daily for 90 days. 90 tablet 3     No current facility-administered medications for this visit.     Review of Systems   Constitutional: Negative.    Skin:        Painful toenails.   All other systems reviewed and are negative.      OBJECTIVE     Vitals:    07/29/22 0933   BP: 146/44   Pulse: 63   Temp: 97.6 °F (36.4 °C)   SpO2: 97%       Patient seen in no apparent distress.      PHYSICAL EXAM:     Foot/Ankle Exam:       General:   Appearance: elderly    Orientation: AAOx3    Affect: appropriate    Gait: unimpaired    Shoe Gear:  Casual shoes    VASCULAR      Right Foot Vascularity   Normal vascular exam    Dorsalis pedis:  1+  Posterior tibial:  1+  Skin Temperature: warm    Edema Grading:  None  CFT:  < 3 seconds  Pedal Hair Growth:  Absent  Varicosities: mild varicosities       Left Foot Vascularity    Normal vascular exam    Dorsalis pedis:  1+  Posterior tibial:  1+  Skin Temperature: warm    Edema Grading:  None  CFT:  < 3 seconds  Pedal Hair Growth:  Absent  Varicosities: mild varicosities        NEUROLOGIC     Right Foot Neurologic   Normal sensation    Light touch sensation:  Normal  Vibratory sensation:  Normal  Hot/Cold sensation: normal    Protective Sensation using Harviell-Lissa Monofilament:  10     Left Foot Neurologic   Normal sensation    Light touch sensation:  Normal  Vibratory sensation:  Normal  Hot/cold sensation: normal    Protective Sensation using Harviell-Lissa Monofilament:  10     MUSCULOSKELETAL      Right Foot Musculoskeletal   Hammertoe:  Third toe, fourth toe, fifth toe and second toe     Left Foot Musculoskeletal   Hammertoe:  Second toe, third toe, fourth toe and fifth toe     MUSCLE STRENGTH     Right Foot Muscle Strength   Foot dorsiflexion:  4  Foot plantar flexion:  4  Foot inversion:  4  Foot eversion:  4     Left Foot Muscle Strength   Foot dorsiflexion:  4  Foot plantar flexion:  4  Foot inversion:  4  Foot eversion:  4     RANGE OF MOTION      Right Foot Range of Motion   Foot and ankle ROM within normal limits       Left Foot Range of Motion   Foot and ankle ROM within normal limits       DERMATOLOGIC     Right Foot Dermatologic   Skin: skin intact    Nails: onychomycosis, abnormally thick, subungual debris, dystrophic nails and ingrown toenail    Nails comment:  Toenails 1, 2, 3, 4, and 5     Left Foot Dermatologic   Skin: skin intact    Nails: onychomycosis, abnormally thick, subungual debris, dystrophic nails and ingrown toenail    Nails comment:  Toenails 1, 2, 3, 4, and 5      ASSESSMENT/PLAN     Diagnoses and all orders for this visit:    1. Foot pain, bilateral (Primary)    2. Onychomycosis    3. Onychocryptosis    4. Hammer toes of both feet        Comprehensive lower extremity examination and evaluation was performed.    Discussed findings and treatment plan  including risks, benefits, and treatment options with patient in detail. Patient agreed with treatment plan.    Toenails 1, 2, 3, 4, 5 on Right and 1, 2, 3, 4, 5 on Left were debrided with nail nippers then filed with a Dremel nail naveen.  Patient tolerated procedure well without complications.    An After Visit Summary was printed and given to the patient at discharge, including (if requested) any available informative/educational handouts regarding diagnosis, treatment, or medications. All questions were answered to patient/family satisfaction. Should symptoms fail to improve or worsen they agree to call or return to clinic or to go to the Emergency Department. Discussed the importance of following up with any needed screening tests/labs/specialist appointments and any requested follow-up recommended by me today. Importance of maintaining follow-up discussed and patient accepts that missed appointments can delay diagnosis and potentially lead to worsening of conditions.    Return in about 9 weeks (around 9/30/2022) for Toenail Care., or sooner if acute issues arise.    This document has been electronically signed by Dmitri Lazaro DPM on July 29, 2022 09:57 EDT

## 2022-08-02 ENCOUNTER — OFFICE VISIT (OUTPATIENT)
Dept: UROLOGY | Facility: CLINIC | Age: 87
End: 2022-08-02

## 2022-08-02 VITALS — RESPIRATION RATE: 18 BRPM | WEIGHT: 161.2 LBS | HEIGHT: 62 IN | BODY MASS INDEX: 29.66 KG/M2

## 2022-08-02 DIAGNOSIS — R32 INCONTINENCE IN FEMALE: Primary | ICD-10-CM

## 2022-08-02 DIAGNOSIS — R35.1 NOCTURIA: ICD-10-CM

## 2022-08-02 LAB
BILIRUB BLD-MCNC: NEGATIVE MG/DL
CLARITY, POC: CLEAR
COLOR UR: YELLOW
EXPIRATION DATE: ABNORMAL
GLUCOSE UR STRIP-MCNC: NEGATIVE MG/DL
KETONES UR QL: ABNORMAL
LEUKOCYTE EST, POC: NEGATIVE
Lab: ABNORMAL
NITRITE UR-MCNC: NEGATIVE MG/ML
PH UR: 5.5 [PH] (ref 5–8)
PROT UR STRIP-MCNC: NEGATIVE MG/DL
RBC # UR STRIP: NEGATIVE /UL
SP GR UR: 1.02 (ref 1–1.03)
URINE VOLUME: 0
UROBILINOGEN UR QL: NORMAL

## 2022-08-02 PROCEDURE — 81003 URINALYSIS AUTO W/O SCOPE: CPT | Performed by: NURSE PRACTITIONER

## 2022-08-02 PROCEDURE — 51798 US URINE CAPACITY MEASURE: CPT | Performed by: NURSE PRACTITIONER

## 2022-08-02 PROCEDURE — 99213 OFFICE O/P EST LOW 20 MIN: CPT | Performed by: NURSE PRACTITIONER

## 2022-08-02 RX ORDER — VIBEGRON 75 MG/1
75 TABLET, FILM COATED ORAL DAILY
Qty: 14 TABLET | Refills: 0 | COMMUNITY
Start: 2022-08-02 | End: 2022-08-25

## 2022-08-10 ENCOUNTER — TELEPHONE (OUTPATIENT)
Dept: UROLOGY | Facility: CLINIC | Age: 87
End: 2022-08-10

## 2022-08-10 DIAGNOSIS — R32 INCONTINENCE IN FEMALE: ICD-10-CM

## 2022-08-10 NOTE — TELEPHONE ENCOUNTER
Spoke to pt regarding medication.  I explained that the medication has been sent in to [facility] pharmacy and they will call her.Pt is of understanding.

## 2022-08-15 ENCOUNTER — TELEPHONE (OUTPATIENT)
Dept: UROLOGY | Facility: CLINIC | Age: 87
End: 2022-08-15

## 2022-08-15 NOTE — TELEPHONE ENCOUNTER
Caller: [email or web address]    Best call back number: [phone]    Patient is needing: THEY ARE WANTING THE PROVIDERS TO AUTH MEDICATION GEMTESA (75ML). THEY STATE YOU CAN GO TO COVER MY MEDS AND USE KEY CODE: [id number] TO AUTHORIZE IT.

## 2022-08-24 ENCOUNTER — TELEPHONE (OUTPATIENT)
Dept: FAMILY MEDICINE CLINIC | Facility: CLINIC | Age: 87
End: 2022-08-24

## 2022-08-24 ENCOUNTER — TELEPHONE (OUTPATIENT)
Dept: UROLOGY | Facility: CLINIC | Age: 87
End: 2022-08-24

## 2022-08-24 DIAGNOSIS — R32 INCONTINENCE IN FEMALE: Primary | ICD-10-CM

## 2022-08-24 DIAGNOSIS — R35.1 NOCTURIA: ICD-10-CM

## 2022-08-24 NOTE — TELEPHONE ENCOUNTER
Spoke to pt regarding the oxybutnin.  Pt can't recall being on this medication.  She is going call her PCP to check to see what he put her on prior to coming to see us.  If not she will reach out to us so we can try the medication.  If so, she is going to talk to him about sleep apnea study.  Pt is of understanding

## 2022-08-24 NOTE — TELEPHONE ENCOUNTER
Can we ask if the patient has tried oxybutynin that would be the last step that I would go to at this point in time before sending her back to her primary care provider for sleep apnea study

## 2022-08-24 NOTE — TELEPHONE ENCOUNTER
Caller: Kristine Rahman    Relationship: Self    Best call back number: [phone]    What is the best time to reach you: ANYTIME    Who are you requesting to speak with (clinical staff, provider,  specific staff member): CLINIC      What was the call regarding: PATIENT CALLED IN STATING SHE IS SEEING KILLIAN CECIL AND IS ON SAMPLES OF MYOBETRIC AND GEMETRIC (PATIENT'S SPELLING) AND THESE ARE NOT WORKING FOR HER. PATIENT STATES THAT CECIL WANTS TO PUT HER ON OXIBUTIN IF SHE HAS NEVER BEEN ON IT BEFORE. PATIENT IS ASKING FOR DR BOWENS TO LOOK BACK IN HER RECORDS TO SEE IF SHE WAS EVER ON THIS MEDICATION AND LET HER KNOW.     Do you require a callback: YES

## 2022-08-24 NOTE — TELEPHONE ENCOUNTER
Pt called to report that she took kher last Gemtesa yesterday. She said that it really hasn't helped her during the night. What is the next step?

## 2022-08-25 RX ORDER — OXYBUTYNIN CHLORIDE 5 MG/1
5 TABLET, EXTENDED RELEASE ORAL DAILY
Qty: 90 TABLET | Refills: 3 | Status: SHIPPED | OUTPATIENT
Start: 2022-08-25 | End: 2022-09-13

## 2022-08-25 NOTE — TELEPHONE ENCOUNTER
I called the patient and told her that Judith sent the prescription for the oxybutynin.  She wanted to know the cost per month, and I told her she would need to call [facility].

## 2022-08-25 NOTE — TELEPHONE ENCOUNTER
Looked in patients chart, we do have it documented that she is on oxybutynin. Patient verified understanding via phone. Stated she would call urology to get that filled.

## 2022-08-25 NOTE — TELEPHONE ENCOUNTER
Patient called and asked if Judith will order the oxybutynin for her to go to [facility] mail order.    She said she doesn't think the Gemtesa worked.  She said she had received a letter for [facility] stating that it was approved from 05/19/22-08/18/23.

## 2022-09-13 ENCOUNTER — OFFICE VISIT (OUTPATIENT)
Dept: UROLOGY | Facility: CLINIC | Age: 87
End: 2022-09-13

## 2022-09-13 VITALS — BODY MASS INDEX: 28.7 KG/M2 | HEIGHT: 63 IN | WEIGHT: 162 LBS | RESPIRATION RATE: 14 BRPM

## 2022-09-13 DIAGNOSIS — R32 INCONTINENCE IN FEMALE: ICD-10-CM

## 2022-09-13 DIAGNOSIS — R35.1 NOCTURIA: Primary | ICD-10-CM

## 2022-09-13 LAB
BILIRUB BLD-MCNC: NEGATIVE MG/DL
CLARITY, POC: CLEAR
COLOR UR: YELLOW
EXPIRATION DATE: NORMAL
GLUCOSE UR STRIP-MCNC: NEGATIVE MG/DL
KETONES UR QL: NEGATIVE
LEUKOCYTE EST, POC: NEGATIVE
Lab: NORMAL
NITRITE UR-MCNC: NEGATIVE MG/ML
PH UR: 7 [PH] (ref 5–8)
PROT UR STRIP-MCNC: NEGATIVE MG/DL
RBC # UR STRIP: NEGATIVE /UL
SP GR UR: 1.02 (ref 1–1.03)
URINE VOLUME: 19
UROBILINOGEN UR QL: NORMAL

## 2022-09-13 PROCEDURE — 99213 OFFICE O/P EST LOW 20 MIN: CPT | Performed by: NURSE PRACTITIONER

## 2022-09-13 PROCEDURE — 51798 US URINE CAPACITY MEASURE: CPT | Performed by: NURSE PRACTITIONER

## 2022-09-13 PROCEDURE — 81003 URINALYSIS AUTO W/O SCOPE: CPT | Performed by: NURSE PRACTITIONER

## 2022-09-13 NOTE — PROGRESS NOTES
"Chief Complaint: Nocturia (Pt here for follow up.  Pt says she can't tell if she is doing any better.  She did try the Gemtesa.  Now taking Oxybutunin)    Subjective         History of Present Illness  Kristine Rahman is a 86 y.o. female presents to [facility] to be seen for f/u  Urinary incontinence.    The patient had tried Gemtesa as prescribed at last visit and states that this medication did not provide any relief of her nocturia.    We prescribed the patient oxybutynin after having issues with Gemtesa coverage and inefficacy.    The patient states that this is not providing any relief of her nocturia.    She is getting up 3-4 x a night.     She has not stopped drinking before bed.       Previous:  She states taht her complaint is mostly at night she is having to get up to void or gets the urge to void and goes in her incontinence underwear, she cannot place a number on this but states this happens \"several times\" a night and states her depends get so soaked and heavy she has to chagne it in the middle of the night.     The patient was placed on Myrbetriq 50 mg q day on 7/11/22. She states this has not helped at all.     She drinks water and fruit juice during the day.     She has no issues with leakage during the day.     She voids 4-5 x during the day.    She drinks up until bedtime.     She has never been tested for sleep apnea.       Objective     Past Medical History:   Diagnosis Date   • Anxiety    • Arthritis    • Asthma    • Crohn's disease (HCC)    • Depression    • Diabetes mellitus (HCC)    • Diverticulitis    • Gastric ulcer    • Hammer toe    • HLD (hyperlipidemia)    • Hypertension    • Seasonal allergies    • Urinary incontinence        Past Surgical History:   Procedure Laterality Date   • CHOLECYSTECTOMY     • COLONOSCOPY     • HEMORRHOIDECTOMY           Current Outpatient Medications:   •  amLODIPine (NORVASC) 10 MG tablet, Take 10 mg by mouth Daily., Disp: , Rfl:   • " " aspirin 81 MG EC tablet, Aspir-81 81 mg oral tablet,delayed release (DR/EC) take 1 tablet (81 mg) by oral route once daily   Active, Disp: , Rfl:   •  benazepril (LOTENSIN) 40 MG tablet, Take 40 mg by mouth Daily., Disp: , Rfl:   •  Calcium Carbonate-Vit D-Min (CALCIUM 1200 PO), Take 1,200 mg by mouth Daily., Disp: , Rfl:   •  cholecalciferol (VITAMIN D3) 25 MCG (1000 UT) tablet, Take 1,000 Units by mouth Daily., Disp: , Rfl:   •  clopidogrel (PLAVIX) 75 MG tablet, Take 75 mg by mouth Daily., Disp: , Rfl:   •  metoprolol tartrate (LOPRESSOR) 50 MG tablet, Take 1 tablet by mouth 2 (Two) Times a Day for 90 days., Disp: 180 tablet, Rfl: 3  •  Probiotic Product (PROBIOTIC-10 PO), Take  by mouth., Disp: , Rfl:   •  chlorthalidone (HYGROTON) 25 MG tablet, Take 1 tablet by mouth Daily for 90 days., Disp: 90 tablet, Rfl: 3  •  Mirabegron ER (Myrbetriq) 25 MG tablet sustained-release 24 hour 24 hr tablet, Take 1 tablet by mouth Daily., Disp: 90 tablet, Rfl: 3  •  pravastatin (PRAVACHOL) 20 MG tablet, Take 1 tablet by mouth Every Night for 90 days., Disp: 90 tablet, Rfl: 3  •  sertraline (ZOLOFT) 100 MG tablet, Take 1 tablet by mouth Daily for 90 days., Disp: 90 tablet, Rfl: 3    Allergies   Allergen Reactions   • Latex Unknown - Low Severity        Family History   Problem Relation Age of Onset   • Colon cancer Cousin    • Diabetes Daughter    • Hypertension Daughter    • Hypertension Son        Social History     Socioeconomic History   • Marital status:    • Number of children: 3   Tobacco Use   • Smoking status: Never Smoker   • Smokeless tobacco: Never Used   Vaping Use   • Vaping Use: Never used   Substance and Sexual Activity   • Alcohol use: Not Currently   • Drug use: Never   • Sexual activity: Not Currently     Birth control/protection: Post-menopausal       Vital Signs:   Resp 14   Ht 158.8 cm (62.5\")   Wt 73.5 kg (162 lb)   BMI 29.16 kg/m²      Physical Exam     Result Review :   The following data was " reviewed by: ROCIO Esqueda on 09/13/2022:  Results for orders placed or performed in visit on 09/13/22   Bladder Scan   Result Value Ref Range    Urine Volume 19    POC Urinalysis Dipstick, Automated    Specimen: Urine   Result Value Ref Range    Color Yellow Yellow, Straw, Dark Yellow, Tori    Clarity, UA Clear Clear    Specific Gravity  1.020 1.005 - 1.030    pH, Urine 7.0 5.0 - 8.0    Leukocytes Negative Negative    Nitrite, UA Negative Negative    Protein, POC Negative Negative mg/dL    Glucose, UA Negative Negative mg/dL    Ketones, UA Negative Negative    Urobilinogen, UA 0.2 E.U./dL Normal, 0.2 E.U./dL    Bilirubin Negative Negative    Blood, UA Negative Negative    Lot Number [id number]     Expiration Date 8/2,023        Bladder Scan interpretation 09/13/2022    Estimation of residual urine via DEXMAI 3000 [employer] Bladder Scan  MA/nurse performing:    Residual Urine: 19 ml  Indication: Nocturia    Incontinence in female   Position: Supine  Examination: Incremental scanning of the suprapubic area using 2.0 MHz transducer using copious amounts of acoustic gel.   Findings: An anechoic area was demonstrated which represented the bladder, with measurement of residual urine as noted. I inspected this myself. In that the residual urine was  insignificant, refer to plan for treatment and plan necessary at this time.           Procedures        Assessment and Plan    Diagnoses and all orders for this visit:    1. Nocturia (Primary)  -     POC Urinalysis Dipstick, Automated  -     Bladder Scan  -     Mirabegron ER (Myrbetriq) 25 MG tablet sustained-release 24 hour 24 hr tablet; Take 1 tablet by mouth Daily.  Dispense: 90 tablet; Refill: 3    2. Incontinence in female  -     POC Urinalysis Dipstick, Automated  -     Bladder Scan  -     Mirabegron ER (Myrbetriq) 25 MG tablet sustained-release 24 hour 24 hr tablet; Take 1 tablet by mouth Daily.  Dispense: 90 tablet; Refill: 3    Nocturia- Discussed with patient that  nocturia is a common condition that is multifactorial in nature and can be difficult to treat, unlikely to completely irradicate, and management is dictated by patient motivation to improve and cope with symptoms.  discussed with patient, recommended behavioral modifications including fluid management, limiting fluids prior to sleep, and voiding immediately prior to sleep. Recommended that patient lay supine in the afternoon with feet above heart level to assist wtih mobilizing dependent edema which typically occurs while supine during sleep. Also, although no history of sleep apnea, could consider workup as studies have shown that with treatment of sleep apnea, nocuria can be significantly reduced. All questions addressed.    Discussed with patient at this point time we have tried all options medically and we will refer back to PCP for sleep study to evaluate for sleep apnea.        I spent 15 minutes caring for Kristine on this date of service. This time includes time spent by me in the following activities:reviewing tests, obtaining and/or reviewing a separately obtained history, performing a medically appropriate examination and/or evaluation , counseling and educating the patient/family/caregiver, ordering medications, tests, or procedures, and documenting information in the medical record  Follow Up   Return in about 3 months (around 12/13/2022) for f/u Nocturia with alst .  Patient was given instructions and counseling regarding her condition or for health maintenance advice. Please see specific information pulled into the AVS if appropriate.         This document has been electronically signed by ROCIO Esqueda  September 13, 2022 10:46 EDT

## 2022-10-11 ENCOUNTER — OFFICE VISIT (OUTPATIENT)
Dept: FAMILY MEDICINE CLINIC | Facility: CLINIC | Age: 87
End: 2022-10-11

## 2022-10-11 VITALS
OXYGEN SATURATION: 93 % | SYSTOLIC BLOOD PRESSURE: 180 MMHG | DIASTOLIC BLOOD PRESSURE: 62 MMHG | TEMPERATURE: 98.4 F | BODY MASS INDEX: 28.92 KG/M2 | HEIGHT: 63 IN | WEIGHT: 163.2 LBS | HEART RATE: 59 BPM

## 2022-10-11 DIAGNOSIS — I10 ESSENTIAL HYPERTENSION: ICD-10-CM

## 2022-10-11 DIAGNOSIS — E78.5 HYPERLIPIDEMIA, UNSPECIFIED HYPERLIPIDEMIA TYPE: ICD-10-CM

## 2022-10-11 DIAGNOSIS — Z23 NEED FOR COVID-19 VACCINE: ICD-10-CM

## 2022-10-11 DIAGNOSIS — Z23 NEED FOR INFLUENZA VACCINATION: Primary | ICD-10-CM

## 2022-10-11 DIAGNOSIS — E11.9 TYPE 2 DIABETES MELLITUS WITHOUT COMPLICATION, WITHOUT LONG-TERM CURRENT USE OF INSULIN: ICD-10-CM

## 2022-10-11 PROCEDURE — 90662 IIV NO PRSV INCREASED AG IM: CPT | Performed by: FAMILY MEDICINE

## 2022-10-11 PROCEDURE — 91312 COVID-19 (PFIZER) BIVALENT BOOSTER 12+YRS: CPT | Performed by: FAMILY MEDICINE

## 2022-10-11 PROCEDURE — 0124A COVID-19 (PFIZER) BIVALENT BOOSTER 12+YRS: CPT | Performed by: FAMILY MEDICINE

## 2022-10-11 PROCEDURE — 99214 OFFICE O/P EST MOD 30 MIN: CPT | Performed by: FAMILY MEDICINE

## 2022-10-11 PROCEDURE — G0008 ADMIN INFLUENZA VIRUS VAC: HCPCS | Performed by: FAMILY MEDICINE

## 2022-10-11 RX ORDER — HYDRALAZINE HYDROCHLORIDE 25 MG/1
25 TABLET, FILM COATED ORAL 3 TIMES DAILY
Qty: 270 TABLET | Refills: 0 | Status: SHIPPED | OUTPATIENT
Start: 2022-10-11 | End: 2023-01-19 | Stop reason: SDUPTHER

## 2022-10-11 NOTE — ASSESSMENT & PLAN NOTE
Her initial blood pressure here today is somewhat elevated.  We will go ahead and recheck it.  Upon recheck her blood pressure remained rather elevated.  I encouraged her to check her blood pressure at home at least a couple times a week and keep a log for her next visit.  In the meantime we will add some hydralazine onto her regiment.

## 2022-10-11 NOTE — PROGRESS NOTES
Chief Complaint   Patient presents with   • Follow-up     3 month    • Hypertension   • Hyperlipidemia   • Diabetes        Subjective     Kristine Rahman  has a past medical history of Anxiety, Arthritis, Asthma, Crohn's disease (HCC), Depression, Diverticulitis, Gastric ulcer, Hammer toe, and Urinary incontinence.    Type 2 diabetes-she does not check her blood sugars outside the office.  She is currently not on any medication.  She denies any blurred vision excessive thirst.  She does have some excessive urination but only at nighttime and not during the day.    Hypertension- she does not check her blood pressure either.  She is taking her medication every day.  Her initial blood pressure here today is somewhat elevated at 192/62.    Hyperlipidemia- she takes her pravastatin on a nightly basis.      PHQ-2 Depression Screening  Little interest or pleasure in doing things?     Feeling down, depressed, or hopeless?     PHQ-2 Total Score     PHQ-9 Depression Screening  Little interest or pleasure in doing things?     Feeling down, depressed, or hopeless?     Trouble falling or staying asleep, or sleeping too much?     Feeling tired or having little energy?     Poor appetite or overeating?     Feeling bad about yourself - or that you are a failure or have let yourself or your family down?     Trouble concentrating on things, such as reading the newspaper or watching television?     Moving or speaking so slowly that other people could have noticed? Or the opposite - being so fidgety or restless that you have been moving around a lot more than usual?     Thoughts that you would be better off dead, or of hurting yourself in some way?     PHQ-9 Total Score     If you checked off any problems, how difficult have these problems made it for you to do your work, take care of things at home, or get along with other people?       Allergies   Allergen Reactions   • Latex Unknown - Low Severity       Prior to Admission medications     Medication Sig Start Date End Date Taking? Authorizing Provider   amLODIPine (NORVASC) 10 MG tablet Take 10 mg by mouth Daily.   Yes ProviderVane MD   aspirin 81 MG EC tablet Aspir-81 81 mg oral tablet,delayed release (DR/EC) take 1 tablet (81 mg) by oral route once daily   Active   Yes ProviderVane MD   benazepril (LOTENSIN) 40 MG tablet Take 40 mg by mouth Daily.   Yes ProviderVane MD   Calcium Carbonate-Vit D-Min (CALCIUM 1200 PO) Take 1,200 mg by mouth Daily.   Yes ProviderVane MD   cholecalciferol (VITAMIN D3) 25 MCG (1000 UT) tablet Take 1,000 Units by mouth Daily.   Yes ProviderVane MD   clopidogrel (PLAVIX) 75 MG tablet Take 75 mg by mouth Daily.   Yes ProviderVane MD   Probiotic Product (PROBIOTIC-10 PO) Take  by mouth.   Yes ProviderVane MD   chlorthalidone (HYGROTON) 25 MG tablet Take 1 tablet by mouth Daily for 90 days. 3/9/22 6/7/22  Sameer Garsia,    metoprolol tartrate (LOPRESSOR) 50 MG tablet Take 1 tablet by mouth 2 (Two) Times a Day for 90 days. 7/11/22 10/9/22  Sameer Garsia DO   pravastatin (PRAVACHOL) 20 MG tablet Take 1 tablet by mouth Every Night for 90 days. 3/9/22 6/7/22  Sameer Garsia DO   sertraline (ZOLOFT) 100 MG tablet Take 1 tablet by mouth Daily for 90 days. 3/9/22 6/7/22  Sameer Garsia DO        Patient Active Problem List   Diagnosis   • Anxiety   • Arthritis   • Asthma   • Colon polyps   • Crohn's disease (HCC)   • Depression   • Diabetes (HCC)   • Diverticulitis   • Essential hypertension   • Gastric ulcer   • Hyperlipemia   • Seasonal allergic rhinitis   • Shortness of breath   • Ulcerative lesion   • Urinary incontinence without sensory awareness   • COVID-19 virus detected        Past Surgical History:   Procedure Laterality Date   • CHOLECYSTECTOMY     • COLONOSCOPY     • HEMORRHOIDECTOMY         Social History     Socioeconomic History   • Marital status:    •  "Number of children: 3   Tobacco Use   • Smoking status: Never   • Smokeless tobacco: Never   Vaping Use   • Vaping Use: Never used   Substance and Sexual Activity   • Alcohol use: Not Currently   • Drug use: Never   • Sexual activity: Not Currently     Birth control/protection: Post-menopausal       Family History   Problem Relation Age of Onset   • Colon cancer Cousin    • Diabetes Daughter    • Hypertension Daughter    • Hypertension Son        Family history, surgical history, past medical history, Allergies and meds reviewed with patient today and updated in [facility] EMR.     ROS:  Review of Systems   Constitutional: Positive for fatigue.   HENT: Negative for congestion, postnasal drip and rhinorrhea.    Eyes: Negative for blurred vision and visual disturbance.   Respiratory: Positive for shortness of breath (With activity). Negative for cough, chest tightness and wheezing.    Cardiovascular: Negative for chest pain and palpitations.   Endocrine: Negative for polydipsia and polyuria.   Genitourinary:        (+) Nocturia   Allergic/Immunologic: Negative for environmental allergies.   Neurological: Negative for headache.   Psychiatric/Behavioral: Negative for depressed mood. The patient is not nervous/anxious.        OBJECTIVE:  Vitals:    10/11/22 1210 10/11/22 1252   BP: (!) 192/62 180/62   BP Location: Right arm Left arm   Patient Position: Sitting Sitting   Cuff Size:  Adult   Pulse: 59    Temp: 98.4 °F (36.9 °C)    SpO2: 93%    Weight: 74 kg (163 lb 3.2 oz)    Height: 158.8 cm (62.5\")      No results found.   Body mass index is 29.37 kg/m².  No LMP recorded. Patient is postmenopausal.    Physical Exam  Vitals and nursing note reviewed.   Constitutional:       General: She is not in acute distress.     Appearance: Normal appearance. She is normal weight.   HENT:      Head: Normocephalic.      Right Ear: Tympanic membrane, ear canal and external ear normal.      Left Ear: Tympanic membrane, ear canal and external " ear normal.      Nose: Nose normal.      Mouth/Throat:      Mouth: Mucous membranes are moist.      Pharynx: Oropharynx is clear.   Eyes:      General: No scleral icterus.     Conjunctiva/sclera: Conjunctivae normal.      Pupils: Pupils are equal, round, and reactive to light.   Cardiovascular:      Rate and Rhythm: Normal rate and regular rhythm.      Pulses: Normal pulses.      Heart sounds: Normal heart sounds. No murmur heard.  Pulmonary:      Effort: Pulmonary effort is normal.      Breath sounds: Normal breath sounds. No wheezing, rhonchi or rales.   Musculoskeletal:      Cervical back: Neck supple. No rigidity or tenderness.   Lymphadenopathy:      Cervical: No cervical adenopathy.   Skin:     General: Skin is warm and dry.      Coloration: Skin is not jaundiced.      Findings: No rash.   Neurological:      General: No focal deficit present.      Mental Status: She is alert and oriented to person, place, and time.   Psychiatric:         Mood and Affect: Mood normal.         Thought Content: Thought content normal.         Judgment: Judgment normal.         Procedures    Office Visit on 09/13/2022   Component Date Value Ref Range Status   • Color 09/13/2022 Yellow  Yellow, Straw, Dark Yellow, Tori Final   • Clarity, UA 09/13/2022 Clear  Clear Final   • Specific Gravity  09/13/2022 1.020  1.005 - 1.030 Final   • pH, Urine 09/13/2022 7.0  5.0 - 8.0 Final   • Leukocytes 09/13/2022 Negative  Negative Final   • Nitrite, UA 09/13/2022 Negative  Negative Final   • Protein, POC 09/13/2022 Negative  Negative mg/dL Final   • Glucose, UA 09/13/2022 Negative  Negative mg/dL Final   • Ketones, UA 09/13/2022 Negative  Negative Final   • Urobilinogen, UA 09/13/2022 0.2 E.U./dL  Normal, 0.2 E.U./dL Final   • Bilirubin 09/13/2022 Negative  Negative Final   • Blood, UA 09/13/2022 Negative  Negative Final   • Lot Number 09/13/2022 [id number]   Final   • Expiration Date 09/13/2022 8/2,023   Final   • Urine Volume 09/13/2022 19    Final       ASSESSMENT/ PLAN:    Diagnoses and all orders for this visit:    1. Need for influenza vaccination (Primary)  -     Fluzone High-Dose 65+yrs ([id number])    2. Need for COVID-19 vaccine  -     COVID-19 Bivalent Booster (Pfizer) 12+yrs    3. Essential hypertension  Assessment & Plan:  Her initial blood pressure here today is somewhat elevated.  We will go ahead and recheck it.  Upon recheck her blood pressure remained rather elevated.  I encouraged her to check her blood pressure at home at least a couple times a week and keep a log for her next visit.  In the meantime we will add some hydralazine onto her regiment.    Orders:  -     Comprehensive Metabolic Panel  -     Lipid Panel    4. Hyperlipidemia, unspecified hyperlipidemia type  Assessment & Plan:  We will update her lipid profile with her labs here today.    Orders:  -     Comprehensive Metabolic Panel  -     Lipid Panel    5. Type 2 diabetes mellitus without complication, without long-term current use of insulin ([facility])  Assessment & Plan:  We will update her A1c.  We will contemplate any changes in her therapy afterwards.    Orders:  -     Comprehensive Metabolic Panel  -     Lipid Panel  -     Hemoglobin A1c    Other orders  -     hydrALAZINE (APRESOLINE) 25 MG tablet; Take 1 tablet by mouth 3 (Three) Times a Day for 90 days.  Dispense: 270 tablet; Refill: 0      Orders Placed Today:     New Medications Ordered This Visit   Medications   • hydrALAZINE (APRESOLINE) 25 MG tablet     Sig: Take 1 tablet by mouth 3 (Three) Times a Day for 90 days.     Dispense:  270 tablet     Refill:  0        Management Plan:     An After Visit Summary was printed and given to the patient at discharge.    Follow-up: Return in about 8 weeks (around 12/6/2022) for Recheck.    Sameer Garsia DO 10/11/2022 12:56 EDT  This note was electronically signed.

## 2022-10-20 ENCOUNTER — OFFICE VISIT (OUTPATIENT)
Dept: PODIATRY | Facility: CLINIC | Age: 87
End: 2022-10-20

## 2022-10-20 VITALS
BODY MASS INDEX: 28.7 KG/M2 | SYSTOLIC BLOOD PRESSURE: 156 MMHG | OXYGEN SATURATION: 97 % | HEIGHT: 63 IN | HEART RATE: 57 BPM | WEIGHT: 162 LBS | TEMPERATURE: 96.7 F | DIASTOLIC BLOOD PRESSURE: 48 MMHG

## 2022-10-20 DIAGNOSIS — M79.671 FOOT PAIN, BILATERAL: Primary | ICD-10-CM

## 2022-10-20 DIAGNOSIS — M79.672 FOOT PAIN, BILATERAL: Primary | ICD-10-CM

## 2022-10-20 DIAGNOSIS — L60.0 ONYCHOCRYPTOSIS: ICD-10-CM

## 2022-10-20 DIAGNOSIS — M20.42 HAMMER TOES OF BOTH FEET: ICD-10-CM

## 2022-10-20 DIAGNOSIS — B35.1 ONYCHOMYCOSIS: ICD-10-CM

## 2022-10-20 DIAGNOSIS — M20.41 HAMMER TOES OF BOTH FEET: ICD-10-CM

## 2022-10-20 PROCEDURE — 11721 DEBRIDE NAIL 6 OR MORE: CPT | Performed by: PODIATRIST

## 2022-10-20 NOTE — PROGRESS NOTES
[facility] - PODIATRY    Today's Date: 10/20/22    Patient Name: Kristine Rahman  MRN: [id number]  CSN: [id number]  PCP: Sameer Garsia DO, Last PCP Visit:  10/11/2022  Referring Provider: No ref. provider found    SUBJECTIVE     Chief Complaint   Patient presents with   • Left Foot - Numbness, Nail Problem, Follow-up   • Right Foot - Numbness, Follow-up, Nail Problem     HPI: Kristine Rahman, a 86 y.o.female, comes to clinic.    New, Established, New Problem:  est  Location:  Toenails  Duration:   Greater than five years  Onset:  Gradual  Nature:  sore with palpation.  Stable, worsening, improving:   Improving  Aggravating factors:  Pain with shoe gear and ambulation.  Previous Treatment:  Debridement    Medical changes: Changes in blood pressure medication.    Patient denies any fevers, chills, nausea, vomiting, shortness of breath, nor any other constitutional signs nor symptoms.       Past Medical History:   Diagnosis Date   • Anxiety    • Arthritis    • Asthma    • Crohn's disease (HCC)    • Depression    • Diverticulitis    • Gastric ulcer    • Hammer toe    • Urinary incontinence      Past Surgical History:   Procedure Laterality Date   • CHOLECYSTECTOMY     • COLONOSCOPY     • HEMORRHOIDECTOMY       Family History   Problem Relation Age of Onset   • Colon cancer Cousin    • Diabetes Daughter    • Hypertension Daughter    • Hypertension Son      Social History     Socioeconomic History   • Marital status:    • Number of children: 3   Tobacco Use   • Smoking status: Never   • Smokeless tobacco: Never   Vaping Use   • Vaping Use: Never used   Substance and Sexual Activity   • Alcohol use: Not Currently   • Drug use: Never   • Sexual activity: Not Currently     Birth control/protection: Post-menopausal     Allergies   Allergen Reactions   • Latex Unknown - Low Severity     Current Outpatient Medications   Medication Sig Dispense Refill   • amLODIPine (NORVASC) 10 MG tablet Take  10 mg by mouth Daily.     • aspirin 81 MG EC tablet Aspir-81 81 mg oral tablet,delayed release (DR/EC) take 1 tablet (81 mg) by oral route once daily   Active     • benazepril (LOTENSIN) 40 MG tablet Take 40 mg by mouth Daily.     • Calcium Carbonate-Vit D-Min (CALCIUM 1200 PO) Take 1,200 mg by mouth Daily.     • cholecalciferol (VITAMIN D3) 25 MCG (1000 UT) tablet Take 1,000 Units by mouth Daily.     • clopidogrel (PLAVIX) 75 MG tablet Take 75 mg by mouth Daily.     • hydrALAZINE (APRESOLINE) 25 MG tablet Take 1 tablet by mouth 3 (Three) Times a Day for 90 days. 270 tablet 0   • Probiotic Product (PROBIOTIC-10 PO) Take  by mouth.     • chlorthalidone (HYGROTON) 25 MG tablet Take 1 tablet by mouth Daily for 90 days. 90 tablet 3   • metoprolol tartrate (LOPRESSOR) 50 MG tablet Take 1 tablet by mouth 2 (Two) Times a Day for 90 days. 180 tablet 3   • pravastatin (PRAVACHOL) 20 MG tablet Take 1 tablet by mouth Every Night for 90 days. 90 tablet 3   • sertraline (ZOLOFT) 100 MG tablet Take 1 tablet by mouth Daily for 90 days. 90 tablet 3     No current facility-administered medications for this visit.     Review of Systems   Constitutional: Negative.    Skin:        Painful toenails.   All other systems reviewed and are negative.      OBJECTIVE     Vitals:    10/20/22 1110   BP: 156/48   Pulse: 57   Temp: 96.7 °F (35.9 °C)   SpO2: 97%       Patient seen in no apparent distress.      PHYSICAL EXAM:     Foot/Ankle Exam:       General:   Appearance: elderly    Orientation: AAOx3    Affect: appropriate    Gait: unimpaired    Shoe Gear:  Casual shoes    VASCULAR      Right Foot Vascularity   Normal vascular exam    Dorsalis pedis:  1+  Posterior tibial:  1+  Skin Temperature: warm    Edema Grading:  None  CFT:  < 3 seconds  Pedal Hair Growth:  Absent  Varicosities: mild varicosities       Left Foot Vascularity   Normal vascular exam    Dorsalis pedis:  1+  Posterior tibial:  1+  Skin Temperature: warm    Edema Grading:   None  CFT:  < 3 seconds  Pedal Hair Growth:  Absent  Varicosities: mild varicosities        NEUROLOGIC     Right Foot Neurologic   Normal sensation    Light touch sensation:  Normal  Vibratory sensation:  Normal  Hot/Cold sensation: normal    Protective Sensation using [place] Monofilament:  10     Left Foot Neurologic   Normal sensation    Light touch sensation:  Normal  Vibratory sensation:  Normal  Hot/cold sensation: normal    Protective Sensation using [place] Monofilament:  10     MUSCULOSKELETAL      Right Foot Musculoskeletal   Hammertoe:  Third toe, fourth toe, fifth toe and second toe     Left Foot Musculoskeletal   Hammertoe:  Second toe, third toe, fourth toe and fifth toe     MUSCLE STRENGTH     Right Foot Muscle Strength   Foot dorsiflexion:  4  Foot plantar flexion:  4  Foot inversion:  4  Foot eversion:  4     Left Foot Muscle Strength   Foot dorsiflexion:  4  Foot plantar flexion:  4  Foot inversion:  4  Foot eversion:  4     RANGE OF MOTION      Right Foot Range of Motion   Foot and ankle ROM within normal limits       Left Foot Range of Motion   Foot and ankle ROM within normal limits       DERMATOLOGIC     Right Foot Dermatologic   Skin: skin intact    Nails: onychomycosis, abnormally thick, subungual debris, dystrophic nails and ingrown toenail    Nails comment:  Toenails 1, 2, 3, 4, and 5     Left Foot Dermatologic   Skin: skin intact    Nails: onychomycosis, abnormally thick, subungual debris, dystrophic nails and ingrown toenail    Nails comment:  Toenails 1, 2, 3, 4, and 5      ASSESSMENT/PLAN     Diagnoses and all orders for this visit:    1. Foot pain, bilateral (Primary)    2. Hammer toes of both feet    3. Onychomycosis    4. Onychocryptosis        Comprehensive lower extremity examination and evaluation was performed.    Discussed findings and treatment plan including risks, benefits, and treatment options with patient in detail. Patient agreed with treatment  plan.    Toenails 1, 2, 3, 4, 5 on Right and 1, 2, 3, 4, 5 on Left were debrided with nail nippers then filed with a Dremel nail naveen.  Patient tolerated procedure well without complications.    An After Visit Summary was printed and given to the patient at discharge, including (if requested) any available informative/educational handouts regarding diagnosis, treatment, or medications. All questions were answered to patient/family satisfaction. Should symptoms fail to improve or worsen they agree to call or return to clinic or to go to the Emergency Department. Discussed the importance of following up with any needed screening tests/labs/specialist appointments and any requested follow-up recommended by me today. Importance of maintaining follow-up discussed and patient accepts that missed appointments can delay diagnosis and potentially lead to worsening of conditions.    Return in about 9 weeks (around 12/22/2022) for Toenail Care., or sooner if acute issues arise.    This document has been electronically signed by Dmitri Lazaro DPM on October 20, 2022 11:33 EDT

## 2022-10-28 ENCOUNTER — TELEPHONE (OUTPATIENT)
Dept: FAMILY MEDICINE CLINIC | Facility: CLINIC | Age: 87
End: 2022-10-28

## 2022-10-28 NOTE — TELEPHONE ENCOUNTER
Provider: NIEVES BOWENS       Caller: DANIS HINOJOSA       Phone Number:  [phone]      Reason for Call:     THE PATIENT SAID SHE WENT TO    WENT TO URGENT CARE FOR VERTIGO TODAY. SHE SAID SHE IS CALLING TO GIVE AN UPDATE TO PCP GOGO. SHE SAID SHE WAS PRESCRIBED MECLIZINE 25 MG TABLETS       THE PATIENT SAID HER  BLOOD PRESSURE /61. SHE SAID HER TEMPERATURE WAS 98, PULSE 64, RESPIRATION 22 AND OXYGEN SATURATION WAS 26.

## 2022-11-16 ENCOUNTER — CLINICAL SUPPORT (OUTPATIENT)
Dept: FAMILY MEDICINE CLINIC | Facility: CLINIC | Age: 87
End: 2022-11-16

## 2022-11-16 DIAGNOSIS — I10 ESSENTIAL HYPERTENSION: ICD-10-CM

## 2022-11-16 LAB
ALBUMIN SERPL-MCNC: 4 G/DL (ref 3.5–5.2)
ALBUMIN/GLOB SERPL: 1.7 G/DL
ALP SERPL-CCNC: 82 U/L (ref 39–117)
ALT SERPL W P-5'-P-CCNC: 9 U/L (ref 1–33)
ANION GAP SERPL CALCULATED.3IONS-SCNC: 8.5 MMOL/L (ref 5–15)
AST SERPL-CCNC: 16 U/L (ref 1–32)
BILIRUB SERPL-MCNC: 0.5 MG/DL (ref 0–1.2)
BUN SERPL-MCNC: 27 MG/DL (ref 8–23)
BUN/CREAT SERPL: 26.2 (ref 7–25)
CALCIUM SPEC-SCNC: 9.9 MG/DL (ref 8.6–10.5)
CHLORIDE SERPL-SCNC: 104 MMOL/L (ref 98–107)
CHOLEST SERPL-MCNC: 134 MG/DL (ref 0–200)
CO2 SERPL-SCNC: 30.5 MMOL/L (ref 22–29)
CREAT SERPL-MCNC: 1.03 MG/DL (ref 0.57–1)
EGFRCR SERPLBLD CKD-EPI 2021: 53.4 ML/MIN/1.73
GLOBULIN UR ELPH-MCNC: 2.4 GM/DL
GLUCOSE SERPL-MCNC: 88 MG/DL (ref 65–99)
HBA1C MFR BLD: 5.4 % (ref 4.8–5.6)
HDLC SERPL-MCNC: 55 MG/DL (ref 40–60)
LDLC SERPL CALC-MCNC: 61 MG/DL (ref 0–100)
LDLC/HDLC SERPL: 1.08 {RATIO}
POTASSIUM SERPL-SCNC: 4.5 MMOL/L (ref 3.5–5.2)
PROT SERPL-MCNC: 6.4 G/DL (ref 6–8.5)
SODIUM SERPL-SCNC: 143 MMOL/L (ref 136–145)
TRIGL SERPL-MCNC: 97 MG/DL (ref 0–150)
VLDLC SERPL-MCNC: 18 MG/DL (ref 5–40)

## 2022-11-16 PROCEDURE — 36415 COLL VENOUS BLD VENIPUNCTURE: CPT | Performed by: FAMILY MEDICINE

## 2022-11-16 PROCEDURE — 80061 LIPID PANEL: CPT | Performed by: FAMILY MEDICINE

## 2022-11-16 PROCEDURE — 83036 HEMOGLOBIN GLYCOSYLATED A1C: CPT | Performed by: FAMILY MEDICINE

## 2022-11-16 PROCEDURE — 80053 COMPREHEN METABOLIC PANEL: CPT | Performed by: FAMILY MEDICINE

## 2022-12-13 ENCOUNTER — OFFICE VISIT (OUTPATIENT)
Dept: FAMILY MEDICINE CLINIC | Facility: CLINIC | Age: 87
End: 2022-12-13

## 2022-12-13 VITALS
SYSTOLIC BLOOD PRESSURE: 149 MMHG | DIASTOLIC BLOOD PRESSURE: 48 MMHG | HEART RATE: 63 BPM | HEIGHT: 63 IN | WEIGHT: 160.8 LBS | TEMPERATURE: 97 F | OXYGEN SATURATION: 90 % | BODY MASS INDEX: 28.49 KG/M2

## 2022-12-13 DIAGNOSIS — L98.9 LESION OF SKIN OF NOSE: ICD-10-CM

## 2022-12-13 DIAGNOSIS — E04.1 THYROID NODULE: ICD-10-CM

## 2022-12-13 DIAGNOSIS — E11.9 TYPE 2 DIABETES MELLITUS WITHOUT COMPLICATION, WITHOUT LONG-TERM CURRENT USE OF INSULIN: ICD-10-CM

## 2022-12-13 DIAGNOSIS — N39.42 URINARY INCONTINENCE WITHOUT SENSORY AWARENESS: ICD-10-CM

## 2022-12-13 DIAGNOSIS — E78.5 HYPERLIPIDEMIA, UNSPECIFIED HYPERLIPIDEMIA TYPE: ICD-10-CM

## 2022-12-13 DIAGNOSIS — I10 ESSENTIAL HYPERTENSION: Primary | ICD-10-CM

## 2022-12-13 PROCEDURE — 99214 OFFICE O/P EST MOD 30 MIN: CPT | Performed by: FAMILY MEDICINE

## 2022-12-13 NOTE — PROGRESS NOTES
Chief Complaint   Patient presents with   • Follow-up     8 week    • Hypertension   • Hyperlipidemia   • Diabetes        Subjective     Kristine Rahman  has a past medical history of Anxiety, Arthritis, Asthma, Crohn's disease (HCC), Depression, Diverticulitis, Gastric ulcer, Hammer toe, and Urinary incontinence.    Type 2 diabetes- she does not check her blood sugars outside the office.  Her recent A1c a month ago was excellent.    Hypertension- her blood pressure here is very similar to her blood pressure readings she takes at home at 149/48.  She is taking her medication every day.    Hyperlipidemia- she is taking her pravastatin on a nightly basis.    Urinary incontinence- she has has chronic urinary incontinence she has tried oxybutynin Myrbetriq and Gemtesa without any benefit whatsoever.  She states her symptoms are worse during the night.      PHQ-2 Depression Screening  Little interest or pleasure in doing things?     Feeling down, depressed, or hopeless?     PHQ-2 Total Score     PHQ-9 Depression Screening  Little interest or pleasure in doing things?     Feeling down, depressed, or hopeless?     Trouble falling or staying asleep, or sleeping too much?     Feeling tired or having little energy?     Poor appetite or overeating?     Feeling bad about yourself - or that you are a failure or have let yourself or your family down?     Trouble concentrating on things, such as reading the newspaper or watching television?     Moving or speaking so slowly that other people could have noticed? Or the opposite - being so fidgety or restless that you have been moving around a lot more than usual?     Thoughts that you would be better off dead, or of hurting yourself in some way?     PHQ-9 Total Score     If you checked off any problems, how difficult have these problems made it for you to do your work, take care of things at home, or get along with other people?       Allergies   Allergen Reactions   • Latex Unknown  - Low Severity       Prior to Admission medications    Medication Sig Start Date End Date Taking? Authorizing Provider   amLODIPine (NORVASC) 10 MG tablet Take 10 mg by mouth Daily.   Yes Vane Greenberg MD   aspirin 81 MG EC tablet Aspir-81 81 mg oral tablet,delayed release (DR/EC) take 1 tablet (81 mg) by oral route once daily   Active   Yes Vane Greenberg MD   benazepril (LOTENSIN) 40 MG tablet Take 40 mg by mouth Daily.   Yes ProviderVane MD   Calcium Carbonate-Vit D-Min (CALCIUM 1200 PO) Take 1,200 mg by mouth Daily.   Yes ProviderVane MD   chlorthalidone (HYGROTON) 25 MG tablet Take 1 tablet by mouth Daily for 90 days. 3/9/22 12/13/22 Yes Sameer Garsia DO   cholecalciferol (VITAMIN D3) 25 MCG (1000 UT) tablet Take 1,000 Units by mouth Daily.   Yes ProviderVane MD   clopidogrel (PLAVIX) 75 MG tablet Take 75 mg by mouth Daily.   Yes ProviderVane MD   hydrALAZINE (APRESOLINE) 25 MG tablet Take 1 tablet by mouth 3 (Three) Times a Day for 90 days. 10/11/22 1/9/23 Yes Sameer Garsia DO   metoprolol tartrate (LOPRESSOR) 50 MG tablet Take 1 tablet by mouth 2 (Two) Times a Day for 90 days. 7/11/22 12/13/22 Yes Sameer Garsia DO   OXYBUTYNIN CHLORIDE PO Take  by mouth.   Yes Vane Greenberg MD   pravastatin (PRAVACHOL) 20 MG tablet Take 1 tablet by mouth Every Night for 90 days. 3/9/22 12/13/22 Yes Sameer Garsia DO   Probiotic Product (PROBIOTIC-10 PO) Take  by mouth.   Yes ProviderVane MD   sertraline (ZOLOFT) 100 MG tablet Take 1 tablet by mouth Daily for 90 days. 3/9/22 12/13/22 Yes Sameer Garsia DO        Patient Active Problem List   Diagnosis   • Anxiety   • Arthritis   • Asthma   • Colon polyps   • Crohn's disease (HCC)   • Depression   • Diabetes (HCC)   • Diverticulitis   • Essential hypertension   • Gastric ulcer   • Hyperlipemia   • Seasonal allergic rhinitis   • Shortness of breath   •  "Ulcerative lesion   • Urinary incontinence without sensory awareness   • COVID-19 virus detected   • Lesion of skin of nose   • Thyroid nodule        Past Surgical History:   Procedure Laterality Date   • CHOLECYSTECTOMY     • COLONOSCOPY     • HEMORRHOIDECTOMY         Social History     Socioeconomic History   • Marital status:    • Number of children: 3   Tobacco Use   • Smoking status: Never   • Smokeless tobacco: Never   Vaping Use   • Vaping Use: Never used   Substance and Sexual Activity   • Alcohol use: Not Currently   • Drug use: Never   • Sexual activity: Not Currently     Birth control/protection: Post-menopausal       Family History   Problem Relation Age of Onset   • Colon cancer Cousin    • Diabetes Daughter    • Hypertension Daughter    • Hypertension Son        Family history, surgical history, past medical history, Allergies and meds reviewed with patient today and updated in [facility] EMR.     ROS:  Review of Systems   Constitutional: Negative for fatigue.   HENT: Negative for congestion, postnasal drip and rhinorrhea.    Eyes: Positive for blurred vision. Negative for visual disturbance.   Respiratory: Positive for shortness of breath. Negative for cough, chest tightness and wheezing.    Cardiovascular: Negative for chest pain and palpitations.   Gastrointestinal: Negative for constipation and diarrhea.   Endocrine: Negative for polydipsia and polyuria.   Genitourinary: Positive for urinary incontinence.   Allergic/Immunologic: Negative for environmental allergies.   Neurological: Negative for headache.   Psychiatric/Behavioral: Negative for depressed mood. The patient is not nervous/anxious.        OBJECTIVE:  Vitals:    12/13/22 1149   BP: 149/48   BP Location: Left arm   Patient Position: Sitting   Pulse: 63   Temp: 97 °F (36.1 °C)   SpO2: 90%   Weight: 72.9 kg (160 lb 12.8 oz)   Height: 158.8 cm (62.5\")     No results found.   Body mass index is 28.94 kg/m².  No LMP recorded. Patient is " postmenopausal.    Physical Exam  Vitals and nursing note reviewed.   Constitutional:       General: She is not in acute distress.     Appearance: Normal appearance. She is normal weight.   HENT:      Head: Normocephalic.        Right Ear: Tympanic membrane, ear canal and external ear normal.      Left Ear: Tympanic membrane, ear canal and external ear normal.      Nose: Nose normal.      Mouth/Throat:      Mouth: Mucous membranes are moist.      Pharynx: Oropharynx is clear.   Eyes:      General: No scleral icterus.     Conjunctiva/sclera: Conjunctivae normal.      Pupils: Pupils are equal, round, and reactive to light.   Cardiovascular:      Rate and Rhythm: Normal rate and regular rhythm.      Pulses: Normal pulses.      Heart sounds: Normal heart sounds. No murmur heard.  Pulmonary:      Effort: Pulmonary effort is normal.      Breath sounds: Normal breath sounds. No wheezing, rhonchi or rales.   Musculoskeletal:      Cervical back: Neck supple. No rigidity or tenderness.   Lymphadenopathy:      Cervical: No cervical adenopathy.   Skin:     General: Skin is warm and dry.      Coloration: Skin is not jaundiced.      Findings: No rash.   Neurological:      General: No focal deficit present.      Mental Status: She is alert and oriented to person, place, and time.   Psychiatric:         Mood and Affect: Mood normal.         Thought Content: Thought content normal.         Judgment: Judgment normal.         Procedures    No visits with results within 30 Day(s) from this visit.   Latest known visit with results is:   Admission on 10/28/2022, Discharged on 10/28/2022   Component Date Value Ref Range Status   • Color 10/28/2022 Yellow   Final   • Clarity, UA 10/28/2022 Clear   Final   • Glucose, UA 10/28/2022 Negative  mg/dL Final   • Bilirubin 10/28/2022 Negative   Final   • Ketones, UA 10/28/2022 Negative   Final   • Specific Gravity  10/28/2022 1.025  1.005 - 1.030 Final   • Blood, UA 10/28/2022 Negative   Final   •  pH, Urine 10/28/2022 5.5  5.0 - 8.0 Final   • Protein, POC 10/28/2022 Trace (A)  mg/dL Final   • Urobilinogen, UA 10/28/2022 Normal   Final   • Nitrite, UA 10/28/2022 Negative   Final   • Leukocytes 10/28/2022 Negative   Final       ASSESSMENT/ PLAN:    Diagnoses and all orders for this visit:    1. Essential hypertension (Primary)  Assessment & Plan:  Her her blood pressure is good here as well as at home in accordance for her age.      2. Hyperlipidemia, unspecified hyperlipidemia type  Assessment & Plan:  Her most recent lipid profile was very good.  We will continue her current medication.      3. Type 2 diabetes mellitus without complication, without long-term current use of insulin (HCC)  Assessment & Plan:  Her most recent A1c was excellent.      4. Urinary incontinence without sensory awareness  Assessment & Plan:  His continued urinary incontinence despite adequate trials of medication.  We will get her back to see urology    Orders:  -     Ambulatory Referral to Urology    5. Lesion of skin of nose  Assessment & Plan:  The lesion on her nose is concerning for malignancy.  She needs to see dermatology for potential biopsy and/or excision.    Orders:  -     Ambulatory Referral to Dermatology    6. Thyroid nodule  Assessment & Plan:  She has a history of thyroid goiter.  This has been monitored in the past.  Her most recent ultrasound showed that these nodules remained unchanged and perhaps smaller in size.  Does not appear that she ever had them biopsied.  We will get an updated thyroid ultrasound to reassess this.    Orders:  -     US Thyroid; Future      Orders Placed Today:     No orders of the defined types were placed in this encounter.       Management Plan:     An After Visit Summary was printed and given to the patient at discharge.    Follow-up: Return in about 4 months (around 4/13/2023).    Sameer Garsia,  12/13/2022 12:32 EST  This note was electronically signed.

## 2022-12-13 NOTE — ASSESSMENT & PLAN NOTE
His continued urinary incontinence despite adequate trials of medication.  We will get her back to see urology

## 2022-12-13 NOTE — ASSESSMENT & PLAN NOTE
The lesion on her nose is concerning for malignancy.  She needs to see dermatology for potential biopsy and/or excision.

## 2022-12-13 NOTE — ASSESSMENT & PLAN NOTE
She has a history of thyroid goiter.  This has been monitored in the past.  Her most recent ultrasound showed that these nodules remained unchanged and perhaps smaller in size.  Does not appear that she ever had them biopsied.  We will get an updated thyroid ultrasound to reassess this.

## 2022-12-27 NOTE — ASSESSMENT & PLAN NOTE
Her blood  pressure is somewhat elevated here today.  We will go ahead and recheck first before adjusting her medication.   Specialty Pharmacy - Refill Coordination    Specialty Medication Orders Linked to Encounter      Flowsheet Row Most Recent Value   Medication #1 predniSONE (DELTASONE) 5 MG tablet (Order#[id number], Rx#[id number])            Refill Questions - Documented Responses      Flowsheet Row Most Recent Value   Patient Availability and HIPAA Verification    Does patient want to proceed with activity? Yes   HIPAA/medical authority confirmed? Yes   Relationship to patient of person spoken to? Self   Refill Screening Questions    Changes to allergies? No   Changes to medications? No   New conditions since last clinic visit? No   Unplanned office visit, urgent care, ED, or hospital admission in the last 4 weeks? No   How does patient/caregiver feel medication is working? Good   Financial problems or insurance changes? No   How many doses of your specialty medications were missed in the last 4 weeks? 0   Would patient like to speak to a pharmacist? No   When does the patient need to receive the medication? 12/30/22   Refill Delivery Questions    How will the patient receive the medication? [facility]   When does the patient need to receive the medication? 12/30/22   Shipping Address Home   Address in [facility] confirmed and updated if neccessary? Yes   Expected Copay ($) 0   Is the patient able to afford the medication copay? Yes   Payment Method zero copay   Days supply of Refill 30   Supplies needed? No supplies needed   Refill activity completed? Yes   Refill activity plan Refill scheduled   Shipment/Pickup Date: 12/29/22            Current Outpatient Medications   Medication Sig    abiraterone (ZYTIGA) 250 mg Tab Take 4 tablets (1,000 mg total) by mouth once daily.    ACCU-CHEK SOFTCLIX LANCETS Misc TEST BLOOD SUGAR ONCE A DAY    amLODIPine (NORVASC) 10 MG tablet Take 1 tablet (10 mg total) by mouth once daily.    atorvastatin (LIPITOR) 20 MG tablet Take 1 tablet (20 mg total) by mouth once daily.    blood sugar diagnostic  "Strp 1 each by Misc.(Non-Drug; Combo Route) route once daily.    blood sugar diagnostic Strp Pt to check up to 6 times daily    blood sugar diagnostic Strp Use to check blood glucose 1-2 times daily as directed.    blood-glucose meter kit Use as instructed    BOOSTRIX TDAP 2.5-8-5 Lf-mcg-Lf/0.5mL Syrg injection     carvediloL (COREG) 3.125 MG tablet Take 1 tablet (3.125 mg total) by mouth 2 (two) times daily.    diazePAM (VALIUM) 5 MG tablet Take 1 tablet (5 mg total) by mouth as needed for Anxiety (take 1 tablet 30 minute prior to procedure).    diclofenac (VOLTAREN) 75 MG EC tablet Take 1 tablet (75 mg total) by mouth 2 (two) times daily.    doxazosin (CARDURA) 4 MG tablet TAKE 1 TABLET(4 MG) BY MOUTH EVERY EVENING    gabapentin (NEURONTIN) 300 MG capsule Take 1 capsule (300 mg total) by mouth 3 (three) times daily.    glipiZIDE (GLUCOTROL) 10 MG tablet Take 1 tablet (10 mg total) by mouth 2 (two) times daily before meals.    hydroCHLOROthiazide (HYDRODIURIL) 25 MG tablet TAKE ONE-HALF TABLET BY MOUTH ONCE DAILY    insulin (LANTUS SOLOSTAR U-100 INSULIN) glargine 100 units/mL (3mL) SubQ pen Inject 22 Units into the skin every evening.    insulin aspart U-100 (NOVOLOG FLEXPEN U-100 INSULIN) 100 unit/mL (3 mL) InPn pen Inject 5 Units into the skin 3 (three) times daily with meals.    irbesartan (AVAPRO) 300 MG tablet Take 1 tablet (300 mg total) by mouth every evening.    lancing device with lancets Kit 1 each by Misc.(Non-Drug; Combo Route) route 2 (two) times daily.    metFORMIN (GLUCOPHAGE) 1000 MG tablet Take 1 tablet (1,000 mg total) by mouth 2 (two) times daily with meals.    pen needle, diabetic (BD ULTRA-FINE KELECHI PEN NEEDLE) 32 gauge x 5/32" Ndle To use with insulin pens at meals and evening shot.    polyethylene glycol (GOLYTELY) 236-22.74-6.74 -5.86 gram suspension Take 4,000 mLs by mouth once.    predniSONE (DELTASONE) 5 MG tablet Take 1 tablet (5 mg total) by mouth 2 (two) times daily.    predniSONE " (DELTASONE) 5 MG tablet Take 1 tablet (5 mg total) by mouth 2 (two) times daily.    tobramycin sulfate 0.3% (TOBREX) 0.3 % ophthalmic solution 1-2 drops topically twice daily to affected toe(s).    tobramycin sulfate 0.3% (TOBREX) 0.3 % ophthalmic solution 1-2 drops topically twice daily to affected toe(s).    triamcinolone acetonide 0.1% (KENALOG) 0.1 % cream Apply topically 2 (two) times daily.   Last reviewed on 12/22/2022 10:19 AM by Jossie Vega RD    Review of patient's allergies indicates:   Allergen Reactions    Hay fever and allergy relief     Last reviewed on  12/19/2022 12:39 PM by Juliette Andrew      Tasks added this encounter   1/22/2023 - Refill Call (Auto Added)   Tasks due within next 3 months   1/14/2023 - Refill Call (Auto Added)     [facility]  [address]  [facility] [address]  Phone: [phone]  Fax: [phone]

## 2022-12-30 ENCOUNTER — HOSPITAL ENCOUNTER (OUTPATIENT)
Dept: ULTRASOUND IMAGING | Facility: HOSPITAL | Age: 87
Discharge: HOME | End: 2022-12-30
Admitting: FAMILY MEDICINE

## 2022-12-30 DIAGNOSIS — E04.1 THYROID NODULE: ICD-10-CM

## 2022-12-30 PROCEDURE — 76536 US EXAM OF HEAD AND NECK: CPT

## 2023-01-19 ENCOUNTER — TELEPHONE (OUTPATIENT)
Dept: FAMILY MEDICINE CLINIC | Facility: CLINIC | Age: 88
End: 2023-01-19
Payer: MEDICARE

## 2023-01-19 RX ORDER — HYDRALAZINE HYDROCHLORIDE 25 MG/1
25 TABLET, FILM COATED ORAL 3 TIMES DAILY
Qty: 270 TABLET | Refills: 0 | Status: SHIPPED | OUTPATIENT
Start: 2023-01-19 | End: 2023-04-04

## 2023-01-19 NOTE — TELEPHONE ENCOUNTER
Caller: Kristine Rahman    Relationship: Self    Best call back number:[phone]    Requested Prescriptions:   Requested Prescriptions     Pending Prescriptions Disp Refills   • hydrALAZINE (APRESOLINE) 25 MG tablet 270 tablet 0     Sig: Take 1 tablet by mouth 3 (Three) Times a Day for 90 days.        Pharmacy where request should be sent: [facility] - [place] [address] [facility] - [phone] [facility] [phone] FX     Additional details provided by patient: PATIENT STATES SHE WOULD LIKE TO BE ADVISED IF DO GOGO BELIEVES SHE STILL NEEDS TO BE ON THIS MEDICATIONS, IF SO PLEASE REFILL    Does the patient have less than a 3 day supply:  [x] Yes  [] No    Would you like a call back once the refill request has been completed: [x] Yes [] No    If the office needs to give you a call back, can they leave a voicemail: [] Yes [] No    Vivek Kenyon Rep   01/19/23 10:22 EST

## 2023-01-25 ENCOUNTER — OFFICE VISIT (OUTPATIENT)
Dept: UROLOGY | Facility: CLINIC | Age: 88
End: 2023-01-25
Payer: MEDICARE

## 2023-01-25 VITALS
BODY MASS INDEX: 28.35 KG/M2 | HEIGHT: 63 IN | DIASTOLIC BLOOD PRESSURE: 47 MMHG | SYSTOLIC BLOOD PRESSURE: 152 MMHG | TEMPERATURE: 97.7 F | HEART RATE: 65 BPM | WEIGHT: 160 LBS

## 2023-01-25 DIAGNOSIS — N95.2 ATROPHIC VAGINITIS: Primary | ICD-10-CM

## 2023-01-25 DIAGNOSIS — N81.10 CYSTOURETHROCELE: ICD-10-CM

## 2023-01-25 DIAGNOSIS — R32 ENURESIS: ICD-10-CM

## 2023-01-25 DIAGNOSIS — R32 INCONTINENCE IN FEMALE: ICD-10-CM

## 2023-01-25 LAB
BILIRUB BLD-MCNC: NEGATIVE MG/DL
CLARITY, POC: CLEAR
COLOR UR: YELLOW
EXPIRATION DATE: NORMAL
GLUCOSE UR STRIP-MCNC: NEGATIVE MG/DL
KETONES UR QL: NEGATIVE
LEUKOCYTE EST, POC: NEGATIVE
Lab: NORMAL
NITRITE UR-MCNC: NEGATIVE MG/ML
PH UR: 6 [PH] (ref 5–8)
PROT UR STRIP-MCNC: NEGATIVE MG/DL
RBC # UR STRIP: NEGATIVE /UL
SP GR UR: 1.03 (ref 1–1.03)
UROBILINOGEN UR QL: NORMAL

## 2023-01-25 PROCEDURE — 99214 OFFICE O/P EST MOD 30 MIN: CPT | Performed by: UROLOGY

## 2023-01-25 PROCEDURE — 81003 URINALYSIS AUTO W/O SCOPE: CPT | Performed by: UROLOGY

## 2023-01-25 RX ORDER — ESTRADIOL 0.1 MG/G
2 CREAM VAGINAL DAILY
Qty: 40 G | Refills: 3 | Status: SHIPPED | OUTPATIENT
Start: 2023-01-25

## 2023-01-25 NOTE — PROGRESS NOTES
"Chief Complaint  Urinary Incontinence (With sensory awareness ) and Nocturia    Enuresis    Subjective  No acute distress        Kristine Rahman presents to [facility] UROLOGY  History of Present Illness    87-year-old white female has been having urinary incontinence especially at night for last 15 years.  She has to wear urinary incontinence pads and has to change 3 times.  She has no dysuria or gross hematuria.  No history of urinary tract infection.  She also has stress urinary incontinence when she sneezes.  She had 3 vaginal deliveries    Objective No acute distress  Vital Signs:   /47   Pulse 65   Temp 97.7 °F (36.5 °C)   Ht 158.8 cm (62.5\")   Wt 72.6 kg (160 lb)   BMI 28.80 kg/m²     Allergies   Allergen Reactions   • Latex Unknown - Low Severity      Past medical history:  has a past medical history of Anxiety, Arthritis, Asthma, Basal cell carcinoma, Crohn's disease (HCC), Depression, Diverticulitis, Gastric ulcer, Hammer toe, and Urinary incontinence.   Past surgical history:  has a past surgical history that includes Colonoscopy; Hemorrhoid surgery; and Cholecystectomy.  Personal history: family history includes Colon cancer in her cousin; Diabetes in her daughter; Hypertension in her daughter and son.  Social history:  reports that she has never smoked. She has never used smokeless tobacco. She reports that she does not currently use alcohol. She reports that she does not use drugs.    Review of Systems    No change from before as outlined above    Physical Exam  Exam conducted with a chaperone present.   Constitutional:       General: She is not in acute distress.     Appearance: Normal appearance. She is normal weight. She is not ill-appearing or toxic-appearing.   HENT:      Head: Normocephalic and atraumatic.      Ears:      Comments: No hearing loss  Cardiovascular:      Rate and Rhythm: Normal rate and regular rhythm.      Pulses: Normal pulses.      Heart sounds: " Normal heart sounds. No murmur heard.  Pulmonary:      Effort: Pulmonary effort is normal.      Breath sounds: Normal breath sounds. No rhonchi or rales.   Abdominal:      Palpations: Abdomen is soft. There is no mass.      Tenderness: There is no abdominal tenderness. There is no right CVA tenderness or left CVA tenderness.   Genitourinary:     Exam position: Lithotomy position.      Labia:         Right: No rash, tenderness or lesion.         Left: No rash, tenderness or lesion.       Urethra: Prolapse present.      Comments: Small urethral caruncle.    Atrophic vaginitis.    Cystourethrocele grade 3.    Atrophic cervix.  No pelvic masses  Musculoskeletal:         General: No swelling. Normal range of motion.      Cervical back: Normal range of motion and neck supple. No rigidity or tenderness.   Lymphadenopathy:      Cervical: No cervical adenopathy.   Skin:     General: Skin is warm.      Coloration: Skin is not jaundiced.   Neurological:      General: No focal deficit present.      Mental Status: She is alert and oriented to person, place, and time.      Motor: No weakness.      Gait: Gait normal.   Psychiatric:         Mood and Affect: Mood normal.         Behavior: Behavior normal.         Thought Content: Thought content normal.         Judgment: Judgment normal.        Result Review :                 Assessment and Plan    Diagnoses and all orders for this visit:    1. Incontinence in female (Primary)  -     POC Urinalysis Dipstick, Automated    2. Enuresis    3. Cystourethrocele    4. Atrophic vaginitis    She has atrophic vaginitis and prolapse of urethral meatus.  I am going to start her on Estrace vaginal cream about 3 to 4 days in the vagina.    Will give her Gemtesa samples 75 mg daily at bedtime and recheck her in 1 month's time to see if that is going to help her or not.  May have to do cystoscopy to see how the bladder is behaving with volume and lack of control     Brief Urine Lab Results  (Last  result in the past 365 days)      Color   Clarity   Blood   Leuk Est   Nitrite   Protein   CREAT   Urine HCG        01/25/23 1318 Yellow   Clear   Negative   Negative   Negative   Negative                  Follow Up   No follow-ups on file.  Patient was given instructions and counseling regarding her condition or for health maintenance advice. Please see specific information pulled into the AVS if appropriate.     Lemuel Pagan MD

## 2023-02-24 ENCOUNTER — OFFICE VISIT (OUTPATIENT)
Dept: UROLOGY | Facility: CLINIC | Age: 88
End: 2023-02-24
Payer: MEDICARE

## 2023-02-24 VITALS
SYSTOLIC BLOOD PRESSURE: 167 MMHG | HEIGHT: 63 IN | DIASTOLIC BLOOD PRESSURE: 51 MMHG | WEIGHT: 160 LBS | TEMPERATURE: 97.8 F | BODY MASS INDEX: 28.35 KG/M2 | HEART RATE: 59 BPM

## 2023-02-24 DIAGNOSIS — N32.81 OVERACTIVE BLADDER: ICD-10-CM

## 2023-02-24 DIAGNOSIS — R35.0 URINARY FREQUENCY: ICD-10-CM

## 2023-02-24 DIAGNOSIS — N95.2 ATROPHIC VAGINITIS: Primary | ICD-10-CM

## 2023-02-24 LAB
BILIRUB BLD-MCNC: NEGATIVE MG/DL
CLARITY, POC: CLEAR
COLOR UR: YELLOW
GLUCOSE UR STRIP-MCNC: NEGATIVE MG/DL
KETONES UR QL: NEGATIVE
LEUKOCYTE EST, POC: NEGATIVE
NITRITE UR-MCNC: NEGATIVE MG/ML
PH UR: 6 [PH] (ref 5–8)
PROT UR STRIP-MCNC: NEGATIVE MG/DL
RBC # UR STRIP: NEGATIVE /UL
SP GR UR: 1.02 (ref 1–1.03)
UROBILINOGEN UR QL: NORMAL

## 2023-02-24 PROCEDURE — 81002 URINALYSIS NONAUTO W/O SCOPE: CPT | Performed by: UROLOGY

## 2023-02-24 PROCEDURE — 99212 OFFICE O/P EST SF 10 MIN: CPT | Performed by: UROLOGY

## 2023-02-24 NOTE — PROGRESS NOTES
"Chief Complaint  Follow-up atrophic vaginitis    Urinary urgency.    Urinary frequency    Overactive bladder    Subjective  No acute distress        Kristine Rahman presents to [facility] UROLOGY  History of Present Illness    Patient continues to have nocturia 3 times and frequency in the daytime.  She does have urinary incontinence but leaks only a few drops.  She has no dysuria or gross hematuria.    Patient has been tried on Ditropan XL and other drugs without any help.  I have given her Gemtesa 75 mg at bedtime and that that has not helped.    Objective No acute distress  Vital Signs:   /51   Pulse 59   Temp 97.8 °F (36.6 °C)   Ht 158.8 cm (62.5\")   Wt 72.6 kg (160 lb)   BMI 28.80 kg/m²     Allergies   Allergen Reactions   • Latex Unknown - Low Severity      Past medical history:  has a past medical history of Anxiety, Arthritis, Asthma, Basal cell carcinoma, Basal cell carcinoma, Crohn's disease (HCC), Depression, Diverticulitis, Gastric ulcer, Hammer toe, and Urinary incontinence.   Past surgical history:  has a past surgical history that includes Colonoscopy; Hemorrhoid surgery; and Cholecystectomy.  Personal history: family history includes Colon cancer in her cousin; Diabetes in her daughter; Hypertension in her daughter and son.  Social history:  reports that she has never smoked. She has never used smokeless tobacco. She reports that she does not currently use alcohol. She reports that she does not use drugs.    Review of Systems    Please see past medical surgical history and rest of the system is negative    Physical Exam  Constitutional:       General: She is not in acute distress.     Appearance: Normal appearance. She is normal weight. She is not ill-appearing.   HENT:      Head: Normocephalic and atraumatic.      Ears:      Comments: No loss of urine  Pulmonary:      Effort: Pulmonary effort is normal. No respiratory distress.   Abdominal:      General: There is no " distension.      Palpations: Abdomen is soft. There is no mass.      Tenderness: There is no abdominal tenderness. There is no right CVA tenderness or left CVA tenderness.   Musculoskeletal:         General: No swelling. Normal range of motion.   Skin:     General: Skin is warm.      Coloration: Skin is not jaundiced.   Neurological:      General: No focal deficit present.      Mental Status: She is alert and oriented to person, place, and time.      Motor: No weakness.      Gait: Gait normal.   Psychiatric:         Mood and Affect: Mood normal.         Behavior: Behavior normal.         Thought Content: Thought content normal.         Judgment: Judgment normal.        Result Review :                 Assessment and Plan    Diagnoses and all orders for this visit:    1. Atrophic vaginitis (Primary)  -     POC Urinalysis Dipstick    2. Urinary frequency    3. Overactive bladder    Previous anticholinergics have failed to help her urinary frequency and urgency.  Gemtesa does not help either.  I will order urodynamics on her and see her after that.  I mentioned about Botox which might be the next thing we need to use for her.     Brief Urine Lab Results  (Last result in the past 365 days)      Color   Clarity   Blood   Leuk Est   Nitrite   Protein   CREAT   Urine HCG        02/24/23 1139 Yellow   Clear   Negative   Negative   Negative   Negative                  Follow Up   No follow-ups on file.  Patient was given instructions and counseling regarding her condition or for health maintenance advice. Please see specific information pulled into the AVS if appropriate.     Lemuel Pagan MD

## 2023-03-16 RX ORDER — PRAVASTATIN SODIUM 20 MG
20 TABLET ORAL NIGHTLY
Qty: 90 TABLET | Refills: 3 | Status: SHIPPED | OUTPATIENT
Start: 2023-03-16 | End: 2023-03-20 | Stop reason: SDUPTHER

## 2023-03-16 RX ORDER — CLOPIDOGREL BISULFATE 75 MG/1
75 TABLET ORAL DAILY
Qty: 90 TABLET | Refills: 1 | Status: SHIPPED | OUTPATIENT
Start: 2023-03-16 | End: 2023-03-20

## 2023-03-20 ENCOUNTER — TELEPHONE (OUTPATIENT)
Dept: FAMILY MEDICINE CLINIC | Facility: CLINIC | Age: 88
End: 2023-03-20

## 2023-03-20 RX ORDER — CLOPIDOGREL BISULFATE 75 MG/1
75 TABLET ORAL DAILY
Qty: 90 TABLET | Refills: 3 | Status: SHIPPED | OUTPATIENT
Start: 2023-03-20

## 2023-03-20 RX ORDER — PRAVASTATIN SODIUM 20 MG
20 TABLET ORAL NIGHTLY
Qty: 90 TABLET | Refills: 3 | Status: SHIPPED | OUTPATIENT
Start: 2023-03-20 | End: 2023-06-18

## 2023-03-20 NOTE — TELEPHONE ENCOUNTER
Caller: Josiah Kristine L    Relationship: Self    Best call back number: [phone]    Requested Prescriptions:      clopidogrel (PLAVIX) 75 MG tablet  75 mg, Daily      pravastatin (PRAVACHOL) 20 MG tablet  20 mg, Nightly         Pharmacy where request should be sent: [facility] - [place], [place] - [facility] [address] - [phone]  - [phone] FX     Additional details provided by patient: PATIENT HAS A 4 DAY SUPPLY  Does the patient have less than a 3 day supply:  [] Yes  [x] No    Would you like a call back once the refill request has been completed: [x] Yes [] No    If the office needs to give you a call back, can they leave a voicemail: [] Yes [x] No    Vivek Claudio Rep   03/20/23 10:47 EDT

## 2023-04-04 RX ORDER — HYDRALAZINE HYDROCHLORIDE 25 MG/1
TABLET, FILM COATED ORAL
Qty: 270 TABLET | Refills: 3 | Status: SHIPPED | OUTPATIENT
Start: 2023-04-04

## 2023-04-04 RX ORDER — SERTRALINE HYDROCHLORIDE 100 MG/1
100 TABLET, FILM COATED ORAL DAILY
Qty: 90 TABLET | Refills: 3 | Status: SHIPPED | OUTPATIENT
Start: 2023-04-04 | End: 2023-07-03

## 2023-04-04 RX ORDER — AMLODIPINE BESYLATE 10 MG/1
10 TABLET ORAL DAILY
Qty: 90 TABLET | Refills: 3 | Status: SHIPPED | OUTPATIENT
Start: 2023-04-04

## 2023-04-04 RX ORDER — BENAZEPRIL HYDROCHLORIDE 40 MG/1
40 TABLET, FILM COATED ORAL DAILY
Qty: 90 TABLET | Refills: 3 | Status: SHIPPED | OUTPATIENT
Start: 2023-04-04

## 2023-04-04 RX ORDER — AMLODIPINE BESYLATE 10 MG/1
10 TABLET ORAL DAILY
Status: CANCELLED | OUTPATIENT
Start: 2023-04-04

## 2023-04-04 NOTE — TELEPHONE ENCOUNTER
Caller: Kristine Rahman    Relationship: Self    Best call back number: [phone]     Requested Prescriptions:   Requested Prescriptions     Pending Prescriptions Disp Refills   • amLODIPine (NORVASC) 10 MG tablet       Sig: Take 1 tablet by mouth Daily.   • benazepril (LOTENSIN) 40 MG tablet       Sig: Take 1 tablet by mouth Daily.        Pharmacy where request should be sent: [facility] - [address] [phone] [facility] [phone] FX     Last office visit with prescribing clinician: 12/13/2022   Last telemedicine visit with prescribing clinician: 4/11/2023   Next office visit with prescribing clinician: 4/11/2023       Does the patient have less than a 3 day supply:  [] Yes  [x] No    Would you like a call back once the refill request has been completed: [x] Yes [] No    If the office needs to give you a call back, can they leave a voicemail: [] Yes [x] No    Vivek Barraza Rep   04/04/23 10:09 EDT

## 2023-04-11 ENCOUNTER — OFFICE VISIT (OUTPATIENT)
Dept: FAMILY MEDICINE CLINIC | Facility: CLINIC | Age: 88
End: 2023-04-11
Payer: MEDICARE

## 2023-04-11 VITALS
DIASTOLIC BLOOD PRESSURE: 50 MMHG | BODY MASS INDEX: 28.81 KG/M2 | HEART RATE: 62 BPM | SYSTOLIC BLOOD PRESSURE: 160 MMHG | WEIGHT: 162.6 LBS | HEIGHT: 63 IN | TEMPERATURE: 97.3 F | OXYGEN SATURATION: 91 %

## 2023-04-11 DIAGNOSIS — I10 ESSENTIAL HYPERTENSION: Primary | ICD-10-CM

## 2023-04-11 DIAGNOSIS — E78.5 HYPERLIPIDEMIA, UNSPECIFIED HYPERLIPIDEMIA TYPE: ICD-10-CM

## 2023-04-11 DIAGNOSIS — R73.03 PRE-DIABETES: ICD-10-CM

## 2023-04-11 LAB
ANION GAP SERPL CALCULATED.3IONS-SCNC: 9 MMOL/L (ref 5–15)
BUN SERPL-MCNC: 25 MG/DL (ref 8–23)
BUN/CREAT SERPL: 25.8 (ref 7–25)
CALCIUM SPEC-SCNC: 10.4 MG/DL (ref 8.6–10.5)
CHLORIDE SERPL-SCNC: 103 MMOL/L (ref 98–107)
CO2 SERPL-SCNC: 31 MMOL/L (ref 22–29)
CREAT SERPL-MCNC: 0.97 MG/DL (ref 0.57–1)
EGFRCR SERPLBLD CKD-EPI 2021: 57.4 ML/MIN/1.73
GLUCOSE SERPL-MCNC: 105 MG/DL (ref 65–99)
HBA1C MFR BLD: 5.8 % (ref 4.8–5.6)
POTASSIUM SERPL-SCNC: 3.8 MMOL/L (ref 3.5–5.2)
SODIUM SERPL-SCNC: 143 MMOL/L (ref 136–145)

## 2023-04-11 PROCEDURE — 80048 BASIC METABOLIC PNL TOTAL CA: CPT | Performed by: FAMILY MEDICINE

## 2023-04-11 PROCEDURE — 83036 HEMOGLOBIN GLYCOSYLATED A1C: CPT | Performed by: FAMILY MEDICINE

## 2023-04-11 PROCEDURE — 3077F SYST BP >= 140 MM HG: CPT | Performed by: FAMILY MEDICINE

## 2023-04-11 PROCEDURE — 3078F DIAST BP <80 MM HG: CPT | Performed by: FAMILY MEDICINE

## 2023-04-11 PROCEDURE — 99214 OFFICE O/P EST MOD 30 MIN: CPT | Performed by: FAMILY MEDICINE

## 2023-04-11 NOTE — ASSESSMENT & PLAN NOTE
Her home blood pressures readings are better than they are here today.  We will continue her current meds and update her labs

## 2023-04-11 NOTE — ASSESSMENT & PLAN NOTE
She is currently not on any diabetic meds and her A1c has been in the prediabetic range.  We will continue to monitor with an A1c

## 2023-04-11 NOTE — PROGRESS NOTES
Chief Complaint   Patient presents with   • Follow-up     4 month    • Hypertension   • Hyperlipidemia   • Diabetes        Subjective     Kristine Rahman  has a past medical history of Anxiety, Arthritis, Asthma, Basal cell carcinoma, Crohn's disease, Depression, Diverticulitis, Gastric ulcer, and Hammer toe.    Diabetes- she does not check her blood sugars outside the office.  He does not have any excessive thirst but does have some blurred vision and excessive hunger.    Hypertension- she does check her blood pressures at home intermittently.  Typically these are in the upper 130s over 50s.    Hyperlipidemia- she is taking her pravastatin on a nightly basis.      PHQ-2 Depression Screening  Little interest or pleasure in doing things?     Feeling down, depressed, or hopeless?     PHQ-2 Total Score     PHQ-9 Depression Screening  Little interest or pleasure in doing things?     Feeling down, depressed, or hopeless?     Trouble falling or staying asleep, or sleeping too much?     Feeling tired or having little energy?     Poor appetite or overeating?     Feeling bad about yourself - or that you are a failure or have let yourself or your family down?     Trouble concentrating on things, such as reading the newspaper or watching television?     Moving or speaking so slowly that other people could have noticed? Or the opposite - being so fidgety or restless that you have been moving around a lot more than usual?     Thoughts that you would be better off dead, or of hurting yourself in some way?     PHQ-9 Total Score     If you checked off any problems, how difficult have these problems made it for you to do your work, take care of things at home, or get along with other people?       Allergies   Allergen Reactions   • Latex Unknown - Low Severity       Prior to Admission medications    Medication Sig Start Date End Date Taking? Authorizing Provider   amLODIPine (NORVASC) 10 MG tablet Take 1 tablet by mouth Daily.  4/4/23  Yes Sameer Garsia DO   aspirin 81 MG EC tablet Aspir-81 81 mg oral tablet,delayed release (DR/EC) take 1 tablet (81 mg) by oral route once daily   Active   Yes ProviderVane MD   benazepril (LOTENSIN) 40 MG tablet Take 1 tablet by mouth Daily. 4/4/23  Yes Sameer Garsia DO   Calcium Carbonate-Vit D-Min (CALCIUM 1200 PO) Take 1,200 mg by mouth Daily.   Yes ProviderVane MD   chlorthalidone (HYGROTON) 25 MG tablet Take 1 tablet by mouth Daily for 90 days. 3/9/22 4/11/23 Yes Sameer aGrsia DO   clopidogrel (PLAVIX) 75 MG tablet Take 1 tablet by mouth Daily. 3/20/23  Yes Sameer Garsia DO   estradiol (ESTRACE) 0.1 MG/GM vaginal cream Insert 2 g into the vagina Daily. 1/25/23  Yes Lemuel Pagan MD   hydrALAZINE (APRESOLINE) 25 MG tablet TAKE 1 TABLET 3 TIMES A DAY 4/4/23  Yes Sameer Garsia DO   pravastatin (PRAVACHOL) 20 MG tablet Take 1 tablet by mouth Every Night for 90 days. 3/20/23 6/18/23 Yes Sameer Garsia DO   Probiotic Product (PROBIOTIC-10 PO) Take  by mouth.   Yes ProviderVane MD   sertraline (ZOLOFT) 100 MG tablet Take 1 tablet by mouth Daily for 90 days. 4/4/23 7/3/23 Yes Sameer Garsia DO   metoprolol tartrate (LOPRESSOR) 50 MG tablet Take 1 tablet by mouth 2 (Two) Times a Day for 90 days. 7/11/22 2/24/23  Sameer Garsia DO        Patient Active Problem List   Diagnosis   • Anxiety   • Arthritis   • Asthma   • Colon polyps   • Crohn's disease   • Depression   • Pre-diabetes   • Diverticulitis   • Essential hypertension   • Gastric ulcer   • Hyperlipemia   • Seasonal allergic rhinitis   • Shortness of breath   • Ulcerative lesion   • Urinary incontinence without sensory awareness   • COVID-19 virus detected   • Lesion of skin of nose   • Thyroid nodule        Past Surgical History:   Procedure Laterality Date   • CHOLECYSTECTOMY     • COLONOSCOPY     • HEMORRHOIDECTOMY         Social  "History     Socioeconomic History   • Marital status:    • Number of children: 3   Tobacco Use   • Smoking status: Never   • Smokeless tobacco: Never   Vaping Use   • Vaping Use: Never used   Substance and Sexual Activity   • Alcohol use: Not Currently   • Drug use: Never   • Sexual activity: Not Currently     Birth control/protection: Post-menopausal       Family History   Problem Relation Age of Onset   • Colon cancer Cousin    • Diabetes Daughter    • Hypertension Daughter    • Hypertension Son        Family history, surgical history, past medical history, Allergies and meds reviewed with patient today and updated in [facility] EMR.     ROS:  Review of Systems   Constitutional: Negative for fatigue.   HENT: Positive for postnasal drip and sneezing. Negative for congestion and rhinorrhea.    Eyes: Positive for blurred vision. Negative for visual disturbance.   Respiratory: Negative for cough, chest tightness, shortness of breath and wheezing.    Cardiovascular: Negative for chest pain and palpitations.   Endocrine: Positive for polyphagia. Negative for polydipsia and polyuria.   Musculoskeletal: Positive for arthralgias and back pain.   Allergic/Immunologic: Negative for environmental allergies.   Neurological: Negative for headache.   Psychiatric/Behavioral: Negative for depressed mood. The patient is not nervous/anxious.        OBJECTIVE:  Vitals:    04/11/23 1108   BP: 160/50   BP Location: Right arm   Patient Position: Sitting   Pulse: 62   Temp: 97.3 °F (36.3 °C)   SpO2: 91%   Weight: 73.8 kg (162 lb 9.6 oz)   Height: 158.8 cm (62.5\")     No results found.   Body mass index is 29.27 kg/m².  No LMP recorded. Patient is postmenopausal.    Physical Exam  Vitals and nursing note reviewed.   Constitutional:       General: She is not in acute distress.     Appearance: Normal appearance. She is normal weight.   HENT:      Head: Normocephalic.      Right Ear: Tympanic membrane, ear canal and external ear normal.     "  Left Ear: Tympanic membrane, ear canal and external ear normal.      Nose: Nose normal.      Mouth/Throat:      Mouth: Mucous membranes are moist.      Pharynx: Oropharynx is clear.   Eyes:      General: No scleral icterus.     Conjunctiva/sclera: Conjunctivae normal.      Pupils: Pupils are equal, round, and reactive to light.   Cardiovascular:      Rate and Rhythm: Normal rate and regular rhythm.      Pulses: Normal pulses.      Heart sounds: Normal heart sounds. No murmur heard.  Pulmonary:      Effort: Pulmonary effort is normal.      Breath sounds: Normal breath sounds. No wheezing, rhonchi or rales.   Musculoskeletal:      Cervical back: Neck supple. No rigidity or tenderness.   Lymphadenopathy:      Cervical: No cervical adenopathy.   Skin:     General: Skin is warm and dry.      Coloration: Skin is not jaundiced.      Findings: No rash.   Neurological:      General: No focal deficit present.      Mental Status: She is alert and oriented to person, place, and time.   Psychiatric:         Mood and Affect: Mood normal.         Thought Content: Thought content normal.         Judgment: Judgment normal.         Procedures    No visits with results within 30 Day(s) from this visit.   Latest known visit with results is:   Office Visit on 02/24/2023   Component Date Value Ref Range Status   • Color 02/24/2023 Yellow  Yellow, Straw, Dark Yellow, Tori Final   • Clarity, UA 02/24/2023 Clear  Clear Final   • Glucose, UA 02/24/2023 Negative  Negative mg/dL Final   • Bilirubin 02/24/2023 Negative  Negative Final   • Ketones, UA 02/24/2023 Negative  Negative Final   • Specific Gravity  02/24/2023 1.020  1.005 - 1.030 Final   • Blood, UA 02/24/2023 Negative  Negative Final   • pH, Urine 02/24/2023 6.0  5.0 - 8.0 Final   • Protein, POC 02/24/2023 Negative  Negative mg/dL Final   • Urobilinogen, UA 02/24/2023 0.2 E.U./dL  Normal, 0.2 E.U./dL Final   • Leukocytes 02/24/2023 Negative  Negative Final   • Nitrite, UA 02/24/2023  Negative  Negative Final       ASSESSMENT/ PLAN:    Diagnoses and all orders for this visit:    1. Essential hypertension (Primary)  Assessment & Plan:  Her home blood pressures readings are better than they are here today.  We will continue her current meds and update her labs    Orders:  -     Basic Metabolic Panel    2. Hyperlipidemia, unspecified hyperlipidemia type  Assessment & Plan:  Her last lipid profile was good.  We will continue her pravastatin but will skip her lipid profile this visit      3. Pre-diabetes  Assessment & Plan:  She is currently not on any diabetic meds and her A1c has been in the prediabetic range.  We will continue to monitor with an A1c    Orders:  -     Basic Metabolic Panel  -     Hemoglobin A1c      Orders Placed Today:     No orders of the defined types were placed in this encounter.       Management Plan:     An After Visit Summary was printed and given to the patient at discharge.    Follow-up: Return in about 6 months (around 10/11/2023) for Recheck.    Sameer Garsia DO 4/11/2023 11:48 EDT  This note was electronically signed.

## 2023-05-08 ENCOUNTER — OFFICE VISIT (OUTPATIENT)
Dept: FAMILY MEDICINE CLINIC | Facility: CLINIC | Age: 88
End: 2023-05-08
Payer: MEDICARE

## 2023-05-08 VITALS
WEIGHT: 164 LBS | OXYGEN SATURATION: 93 % | TEMPERATURE: 97.3 F | DIASTOLIC BLOOD PRESSURE: 54 MMHG | BODY MASS INDEX: 29.52 KG/M2 | SYSTOLIC BLOOD PRESSURE: 120 MMHG | HEART RATE: 64 BPM

## 2023-05-08 DIAGNOSIS — Z23 NEED FOR TETANUS, DIPHTHERIA, AND ACELLULAR PERTUSSIS (TDAP) VACCINE: ICD-10-CM

## 2023-05-08 DIAGNOSIS — Z00.00 MEDICARE ANNUAL WELLNESS VISIT, SUBSEQUENT: ICD-10-CM

## 2023-05-08 DIAGNOSIS — I10 ESSENTIAL HYPERTENSION: Primary | ICD-10-CM

## 2023-05-08 DIAGNOSIS — E78.5 HYPERLIPIDEMIA, UNSPECIFIED HYPERLIPIDEMIA TYPE: ICD-10-CM

## 2023-05-08 DIAGNOSIS — R73.03 PRE-DIABETES: ICD-10-CM

## 2023-05-08 DIAGNOSIS — Z23 NEED FOR SHINGLES VACCINE: ICD-10-CM

## 2023-05-08 RX ORDER — CHLORTHALIDONE 25 MG/1
25 TABLET ORAL DAILY
Qty: 90 TABLET | Refills: 3 | Status: SHIPPED | OUTPATIENT
Start: 2023-05-08 | End: 2023-08-06

## 2023-05-08 RX ORDER — ZOSTER VACCINE RECOMBINANT, ADJUVANTED 50 MCG/0.5
0.5 KIT INTRAMUSCULAR ONCE
Qty: 1 EACH | Refills: 1 | Status: SHIPPED | OUTPATIENT
Start: 2023-05-08 | End: 2023-05-08

## 2023-05-08 NOTE — ASSESSMENT & PLAN NOTE
Overall for 87 she is doing well.  She states she has had her pneumococcal vaccine since the age of 67.  She had the shingles vaccine but only when it was 1 injection and most likely the Zostavax.  Says she needs to get the Shingrix.  Also the last documented Tdap was over 20 years ago.

## 2023-05-08 NOTE — PROGRESS NOTES
AWV Documentation:   Patient Info:     I reviewed all aspects of the patient's history      Compared to 1 year ago, patient's physical health feels:  Worse    Compared to 1 year ago, patient's mental health feels:  Worse  Advanced Care Planning:     Was a discussion had with the patient regarding their ACP?: Yes      Details included:  Has an advanced directive (not in EMR), copy requested  Vital Signs:     Blood Pressure Evaluation:  In the acceptable range  Health Risk Assessment:     Does the patient have evidence of cognitive impairment: No      Is aspirin on the med list: Yes      Aspirin use:  Aspirin use is not indicated based on review of current medical condition(s). Risk of harm outweighs potential benefits. Patient instructed to discontinue this medication.      Subsequent Medicare Wellness Visit    Subjective    Kristine Rahman is a 87 y.o. female who presents for a Subsequent Medicare Wellness Visit.    The following portions of the patient's history were reviewed and   updated as appropriate: allergies, current medications, past family history, past medical history, past social history, past surgical history and problem list.    Compared to one year ago, the patient feels her physical   health is worse.    Compared to one year ago, the patient feels her mental   health is worse.    Recent Hospitalizations:  She was not admitted to the hospital during the last year.       Current Medical Providers:  Patient Care Team:  Sameer Garsia DO as PCP - General (Family Medicine)  Judith Escalante APRN as Nurse Practitioner (Nurse Practitioner)    Outpatient Medications Prior to Visit   Medication Sig Dispense Refill   • amLODIPine (NORVASC) 10 MG tablet Take 1 tablet by mouth Daily. 90 tablet 3   • aspirin 81 MG EC tablet Aspir-81 81 mg oral tablet,delayed release (DR/EC) take 1 tablet (81 mg) by oral route once daily   Active     • benazepril (LOTENSIN) 40 MG tablet Take 1 tablet by mouth Daily. 90  tablet 3   • Calcium Carbonate-Vit D-Min (CALCIUM 1200 PO) Take 1,200 mg by mouth Daily.     • clopidogrel (PLAVIX) 75 MG tablet Take 1 tablet by mouth Daily. 90 tablet 3   • estradiol (ESTRACE) 0.1 MG/GM vaginal cream Insert 2 g into the vagina Daily. 40 g 3   • hydrALAZINE (APRESOLINE) 25 MG tablet TAKE 1 TABLET 3 TIMES A  tablet 3   • pravastatin (PRAVACHOL) 20 MG tablet Take 1 tablet by mouth Every Night for 90 days. 90 tablet 3   • Probiotic Product (PROBIOTIC-10 PO) Take  by mouth.     • sertraline (ZOLOFT) 100 MG tablet Take 1 tablet by mouth Daily for 90 days. 90 tablet 3   • chlorthalidone (HYGROTON) 25 MG tablet Take 1 tablet by mouth Daily for 90 days. 90 tablet 3   • metoprolol tartrate (LOPRESSOR) 50 MG tablet Take 1 tablet by mouth 2 (Two) Times a Day for 90 days. 180 tablet 3     No facility-administered medications prior to visit.       No opioid medication identified on active medication list. I have reviewed chart for other potential  high risk medication/s and harmful drug interactions in the elderly.          Aspirin is on active medication list. Aspirin use is not indicated based on review of current medical condition/s. Risk of harm outweighs potential benefits. Patient instructed to discontinue this medication.  .      Patient Active Problem List   Diagnosis   • Anxiety   • Arthritis   • Asthma   • Colon polyps   • Crohn's disease   • Depression   • Pre-diabetes   • Diverticulitis   • Essential hypertension   • Gastric ulcer   • Hyperlipemia   • Seasonal allergic rhinitis   • Shortness of breath   • Ulcerative lesion   • Urinary incontinence without sensory awareness   • COVID-19 virus detected   • Lesion of skin of nose   • Thyroid nodule   • Medicare annual wellness visit, subsequent   • Need for tetanus, diphtheria, and acellular pertussis (Tdap) vaccine   • Need for shingles vaccine     Advance Care Planning   Advance Care Planning     Advance Directive is not on file.  ACP  "discussion was held with the patient during this visit. Patient has an advance directive (not in EMR), copy requested.     Objective    Vitals:    23 1452 23 1531   BP: 157/64 120/54   BP Location: Left arm Right arm   Patient Position: Sitting Sitting   Cuff Size: Adult Adult   Pulse: 64    Temp: 97.3 °F (36.3 °C)    SpO2: 93%    Weight: 74.4 kg (164 lb)      Estimated body mass index is 29.52 kg/m² as calculated from the following:    Height as of 23: 158.8 cm (62.5\").    Weight as of this encounter: 74.4 kg (164 lb).    BMI is >= 25 and <30. (Overweight) The following options were offered after discussion;: exercise counseling/recommendations      Does the patient have evidence of cognitive impairment? No    Lab Results   Component Value Date    HGBA1C 5.80 (H) 2023        HEALTH RISK ASSESSMENT    Smoking Status:  Social History     Tobacco Use   Smoking Status Never   Smokeless Tobacco Never     Alcohol Consumption:  Social History     Substance and Sexual Activity   Alcohol Use Not Currently     Fall Risk Screen:    STEADI Fall Risk Assessment was completed, and patient is at LOW risk for falls.Assessment completed on:2023    Depression Screenin/11/2023    11:10 AM   PHQ-2/PHQ-9 Depression Screening   Little Interest or Pleasure in Doing Things 0-->not at all   Feeling Down, Depressed or Hopeless 0-->not at all   Trouble Falling or Staying Asleep, or Sleeping Too Much 0-->not at all   Feeling Tired or Having Little Energy 0-->not at all   Poor Appetite or Overeating 0-->not at all   Feeling Bad about Yourself - or that You are a Failure or Have Let Yourself or Your Family Down 0-->not at all   Trouble Concentrating on Things, Such as Reading the Newspaper or Watching Television 0-->not at all   Moving or Speaking So Slowly that Other People Could Have Noticed? Or the Opposite - Being So Fidgety 0-->not at all   Thoughts that You Would be Better Off Dead or of Hurting " Yourself in Some Way 0-->not at all   PHQ-9: Brief Depression Severity Measure Score 0   If You Checked Off Any Problems, How Difficult Have These Problems Made It For You to Do Your Work, Take Care of Things at Home, or Get Along with Other People? not difficult at all       Health Habits and Functional and Cognitive Screenin/8/2023     3:03 PM   Functional & Cognitive Status   Do you have difficulty preparing food and eating? No   Do you have difficulty bathing yourself, getting dressed or grooming yourself? No   Do you have difficulty using the toilet? No   Do you have difficulty moving around from place to place? No   Do you have trouble with steps or getting out of a bed or a chair? Yes   Current Diet Frequent Junk Food   Dental Exam Up to date   Eye Exam Up to date   Exercise (times per week) 2 times per week   Current Exercises Include Yard Work   Do you need help using the phone?  Yes   Are you deaf or do you have serious difficulty hearing?  No   Do you need help with transportation? No   Do you need help shopping? No   Do you need help preparing meals?  No   Do you need help with housework?  Yes   Do you need help with laundry? No   Do you need help taking your medications? No   Do you need help managing money? No   Do you ever drive or ride in a car without wearing a seat belt? No   Have you felt unusual stress, anger or loneliness in the last month? Yes   Who do you live with? Other   If you need help, do you have trouble finding someone available to you? Yes   Have you been bothered in the last four weeks by sexual problems? No   Do you have difficulty concentrating, remembering or making decisions? Yes       Age-appropriate Screening Schedule:  Refer to the list below for future screening recommendations based on patient's age, sex and/or medical conditions. Orders for these recommended tests are listed in the plan section. The patient has been provided with a written plan.    Health  Maintenance   Topic Date Due   • URINE MICROALBUMIN  Never done   • Pneumococcal Vaccine 65+ (1 - PCV) Never done   • ZOSTER VACCINE (1 of 2) Never done   • TDAP/TD VACCINES (2 - Tdap) 06/17/2009   • DXA SCAN  01/27/2019   • DIABETIC EYE EXAM  10/23/2021   • INFLUENZA VACCINE  08/01/2023   • HEMOGLOBIN A1C  10/11/2023   • LIPID PANEL  11/16/2023   • ANNUAL WELLNESS VISIT  05/08/2024   • COVID-19 Vaccine  Completed                  CMS Preventative Services Quick Reference  Risk Factors Identified During Encounter  Inactivity/Sedentary: Patient was advised to exercise at least 150 minutes a week per CDC recommendations.  The above risks/problems have been discussed with the patient.  Pertinent information has been shared with the patient in the After Visit Summary.  An After Visit Summary and PPPS were made available to the patient.    Follow Up:   Next Medicare Wellness visit to be scheduled in 1 year.       Additional E&M Note during same encounter follows:  Patient has multiple medical problems which are significant and separately identifiable that require additional work above and beyond the Medicare Wellness Visit.      Chief Complaint  Medicare Wellness-subsequent, Hypertension, Hyperlipidemia, Knee Pain (Pt C/O bilateral leg pain, and  bilateral knee pain mostly at night. S/S 1 month ago. ), and Leg Pain (C/o bilateral leg pain at night )    Subjective      Hypertension- she does not check her blood pressure at home.  Her blood pressure here is mildly elevated at 157/64.    Prediabetes-her recent blood work was mildly abnormal with an A1c of 5.8.  [place] barely falls into the guidelines of prediabetes.    Kristine Rahman is also being seen today for Medicare Wellness.    Review of Systems   Constitutional: Positive for fatigue.   HENT: Positive for congestion, postnasal drip and rhinorrhea.    Eyes: Negative for visual disturbance.   Respiratory: Negative for cough, chest tightness, shortness of breath and  wheezing.    Cardiovascular: Negative for chest pain and palpitations.   Endocrine: Negative for polydipsia and polyuria.   Genitourinary: Positive for frequency.       Objective   Vital Signs:  /54 (BP Location: Right arm, Patient Position: Sitting, Cuff Size: Adult)   Pulse 64   Temp 97.3 °F (36.3 °C)   Wt 74.4 kg (164 lb)   SpO2 93%   BMI 29.52 kg/m²     Physical Exam  Vitals and nursing note reviewed.   Constitutional:       General: She is not in acute distress.     Appearance: Normal appearance. She is normal weight.   HENT:      Head: Normocephalic.      Right Ear: Tympanic membrane, ear canal and external ear normal.      Left Ear: Tympanic membrane, ear canal and external ear normal.      Nose: Nose normal.      Mouth/Throat:      Mouth: Mucous membranes are moist.      Pharynx: Oropharynx is clear.   Eyes:      General: No scleral icterus.     Conjunctiva/sclera: Conjunctivae normal.      Pupils: Pupils are equal, round, and reactive to light.   Cardiovascular:      Rate and Rhythm: Normal rate and regular rhythm.      Pulses: Normal pulses.      Heart sounds: Normal heart sounds. No murmur heard.     Comments: Multiple varicosities  Pulmonary:      Effort: Pulmonary effort is normal.      Breath sounds: Normal breath sounds. No wheezing, rhonchi or rales.   Musculoskeletal:      Cervical back: Neck supple. No rigidity or tenderness.      Right lower leg: No edema.      Left lower leg: No edema.   Lymphadenopathy:      Cervical: No cervical adenopathy.   Skin:     General: Skin is warm and dry.      Coloration: Skin is not jaundiced.      Findings: No rash.   Neurological:      General: No focal deficit present.      Mental Status: She is alert and oriented to person, place, and time.   Psychiatric:         Mood and Affect: Mood normal.         Thought Content: Thought content normal.         Judgment: Judgment normal.                     Assessment and Plan Diagnoses and all orders for this  visit:    1. Essential hypertension (Primary)  Assessment & Plan:  Her blood pressure is mildly elevated here today.  Recheck it 1 more time      2. Hyperlipidemia, unspecified hyperlipidemia type    3. Pre-diabetes  Assessment & Plan:  Her last A1c was 5.8 and thus barely prediabetic.  I think it 87 as long as she just watches her carbohydrate intake she will be fine.      4. Medicare annual wellness visit, subsequent  Assessment & Plan:  Overall for 87 she is doing well.  She states she has had her pneumococcal vaccine since the age of 67.  She had the shingles vaccine but only when it was 1 injection and most likely the Zostavax.  Says she needs to get the Shingrix.  Also the last documented Tdap was over 20 years ago.      5. Need for tetanus, diphtheria, and acellular pertussis (Tdap) vaccine    6. Need for shingles vaccine       Follow Up Return for Next scheduled follow up.  Patient was given instructions and counseling regarding her condition or for health maintenance advice. Please see specific information pulled into the AVS if appropriate.

## 2023-05-08 NOTE — ASSESSMENT & PLAN NOTE
Her last A1c was 5.8 and thus barely prediabetic.  I think it 87 as long as she just watches her carbohydrate intake she will be fine.

## 2023-05-20 ENCOUNTER — HOSPITAL ENCOUNTER (EMERGENCY)
Facility: HOSPITAL | Age: 88
Discharge: HOME | End: 2023-05-20
Attending: EMERGENCY MEDICINE
Payer: MEDICARE

## 2023-05-20 VITALS
DIASTOLIC BLOOD PRESSURE: 62 MMHG | BODY MASS INDEX: 29.37 KG/M2 | SYSTOLIC BLOOD PRESSURE: 152 MMHG | HEART RATE: 65 BPM | TEMPERATURE: 98.2 F | RESPIRATION RATE: 16 BRPM | HEIGHT: 62 IN | OXYGEN SATURATION: 95 % | WEIGHT: 159.61 LBS

## 2023-05-20 DIAGNOSIS — K52.9 AGE (ACUTE GASTROENTERITIS): ICD-10-CM

## 2023-05-20 DIAGNOSIS — R19.7 DIARRHEA, UNSPECIFIED TYPE: Primary | ICD-10-CM

## 2023-05-20 LAB
ALBUMIN SERPL-MCNC: 4.2 G/DL (ref 3.5–5.2)
ALBUMIN/GLOB SERPL: 1.4 G/DL
ALP SERPL-CCNC: 90 U/L (ref 39–117)
ALT SERPL W P-5'-P-CCNC: 14 U/L (ref 1–33)
ANION GAP SERPL CALCULATED.3IONS-SCNC: 13.3 MMOL/L (ref 5–15)
AST SERPL-CCNC: 19 U/L (ref 1–32)
BACTERIA UR QL AUTO: ABNORMAL /HPF
BASOPHILS # BLD AUTO: 0.03 10*3/MM3 (ref 0–0.2)
BASOPHILS NFR BLD AUTO: 0.4 % (ref 0–1.5)
BILIRUB SERPL-MCNC: 0.8 MG/DL (ref 0–1.2)
BILIRUB UR QL STRIP: NEGATIVE
BUN SERPL-MCNC: 33 MG/DL (ref 8–23)
BUN/CREAT SERPL: 26.8 (ref 7–25)
CALCIUM SPEC-SCNC: 9.7 MG/DL (ref 8.6–10.5)
CHLORIDE SERPL-SCNC: 100 MMOL/L (ref 98–107)
CLARITY UR: CLEAR
CO2 SERPL-SCNC: 26.7 MMOL/L (ref 22–29)
COLOR UR: YELLOW
CREAT SERPL-MCNC: 1.23 MG/DL (ref 0.57–1)
D-LACTATE SERPL-SCNC: 0.7 MMOL/L (ref 0.5–2)
DEPRECATED RDW RBC AUTO: 45.4 FL (ref 37–54)
EGFRCR SERPLBLD CKD-EPI 2021: 43.2 ML/MIN/1.73
EOSINOPHIL # BLD AUTO: 0.08 10*3/MM3 (ref 0–0.4)
EOSINOPHIL NFR BLD AUTO: 0.9 % (ref 0.3–6.2)
ERYTHROCYTE [DISTWIDTH] IN BLOOD BY AUTOMATED COUNT: 12.8 % (ref 12.3–15.4)
GLOBULIN UR ELPH-MCNC: 2.9 GM/DL
GLUCOSE SERPL-MCNC: 110 MG/DL (ref 65–99)
GLUCOSE UR STRIP-MCNC: NEGATIVE MG/DL
HCT VFR BLD AUTO: 42.7 % (ref 34–46.6)
HGB BLD-MCNC: 14.5 G/DL (ref 12–15.9)
HGB UR QL STRIP.AUTO: NEGATIVE
HOLD SPECIMEN: NORMAL
HOLD SPECIMEN: NORMAL
HYALINE CASTS UR QL AUTO: ABNORMAL /LPF
IMM GRANULOCYTES # BLD AUTO: 0.03 10*3/MM3 (ref 0–0.05)
IMM GRANULOCYTES NFR BLD AUTO: 0.4 % (ref 0–0.5)
KETONES UR QL STRIP: ABNORMAL
LEUKOCYTE ESTERASE UR QL STRIP.AUTO: ABNORMAL
LIPASE SERPL-CCNC: 10 U/L (ref 13–60)
LYMPHOCYTES # BLD AUTO: 2 10*3/MM3 (ref 0.7–3.1)
LYMPHOCYTES NFR BLD AUTO: 23.4 % (ref 19.6–45.3)
MCH RBC QN AUTO: 32.7 PG (ref 26.6–33)
MCHC RBC AUTO-ENTMCNC: 34 G/DL (ref 31.5–35.7)
MCV RBC AUTO: 96.2 FL (ref 79–97)
MONOCYTES # BLD AUTO: 0.8 10*3/MM3 (ref 0.1–0.9)
MONOCYTES NFR BLD AUTO: 9.4 % (ref 5–12)
MUCOUS THREADS URNS QL MICRO: ABNORMAL /HPF
NEUTROPHILS NFR BLD AUTO: 5.61 10*3/MM3 (ref 1.7–7)
NEUTROPHILS NFR BLD AUTO: 65.5 % (ref 42.7–76)
NITRITE UR QL STRIP: NEGATIVE
NRBC BLD AUTO-RTO: 0 /100 WBC (ref 0–0.2)
PH UR STRIP.AUTO: <=5 [PH] (ref 5–8)
PLATELET # BLD AUTO: 167 10*3/MM3 (ref 140–450)
PMV BLD AUTO: 10.9 FL (ref 6–12)
POTASSIUM SERPL-SCNC: 3.6 MMOL/L (ref 3.5–5.2)
PROT SERPL-MCNC: 7.1 G/DL (ref 6–8.5)
PROT UR QL STRIP: NEGATIVE
RBC # BLD AUTO: 4.44 10*6/MM3 (ref 3.77–5.28)
RBC # UR STRIP: ABNORMAL /HPF
REF LAB TEST METHOD: ABNORMAL
SODIUM SERPL-SCNC: 140 MMOL/L (ref 136–145)
SP GR UR STRIP: 1.01 (ref 1–1.03)
SQUAMOUS #/AREA URNS HPF: ABNORMAL /HPF
UROBILINOGEN UR QL STRIP: ABNORMAL
WBC # UR STRIP: ABNORMAL /HPF
WBC NRBC COR # BLD: 8.55 10*3/MM3 (ref 3.4–10.8)
WHOLE BLOOD HOLD COAG: NORMAL
WHOLE BLOOD HOLD SPECIMEN: NORMAL

## 2023-05-20 PROCEDURE — 99283 EMERGENCY DEPT VISIT LOW MDM: CPT

## 2023-05-20 PROCEDURE — 83605 ASSAY OF LACTIC ACID: CPT

## 2023-05-20 PROCEDURE — 25010000002 ONDANSETRON PER 1 MG: Performed by: EMERGENCY MEDICINE

## 2023-05-20 PROCEDURE — 96374 THER/PROPH/DIAG INJ IV PUSH: CPT

## 2023-05-20 PROCEDURE — 83690 ASSAY OF LIPASE: CPT

## 2023-05-20 PROCEDURE — 80053 COMPREHEN METABOLIC PANEL: CPT

## 2023-05-20 PROCEDURE — 36415 COLL VENOUS BLD VENIPUNCTURE: CPT

## 2023-05-20 PROCEDURE — 85025 COMPLETE CBC W/AUTO DIFF WBC: CPT

## 2023-05-20 PROCEDURE — 81001 URINALYSIS AUTO W/SCOPE: CPT

## 2023-05-20 RX ORDER — SODIUM CHLORIDE 0.9 % (FLUSH) 0.9 %
10 SYRINGE (ML) INJECTION AS NEEDED
Status: DISCONTINUED | OUTPATIENT
Start: 2023-05-20 | End: 2023-05-21 | Stop reason: HOSPADM

## 2023-05-20 RX ORDER — DICYCLOMINE HYDROCHLORIDE 10 MG/1
10 CAPSULE ORAL 3 TIMES DAILY PRN
Qty: 12 CAPSULE | Refills: 0 | Status: SHIPPED | OUTPATIENT
Start: 2023-05-20

## 2023-05-20 RX ORDER — ONDANSETRON 2 MG/ML
4 INJECTION INTRAMUSCULAR; INTRAVENOUS ONCE
Status: COMPLETED | OUTPATIENT
Start: 2023-05-20 | End: 2023-05-20

## 2023-05-20 RX ORDER — DICYCLOMINE HYDROCHLORIDE 10 MG/1
20 CAPSULE ORAL ONCE
Status: COMPLETED | OUTPATIENT
Start: 2023-05-20 | End: 2023-05-20

## 2023-05-20 RX ADMIN — DICYCLOMINE HYDROCHLORIDE 20 MG: 10 CAPSULE ORAL at 22:02

## 2023-05-20 RX ADMIN — ONDANSETRON 4 MG: 2 INJECTION INTRAMUSCULAR; INTRAVENOUS at 22:02

## 2023-05-20 RX ADMIN — SODIUM CHLORIDE 1000 ML: 9 INJECTION, SOLUTION INTRAVENOUS at 22:02

## 2023-05-20 NOTE — ED PROVIDER NOTES
Time: 3:42 PM EDT  Date of encounter:  5/20/2023  Independent Historian/Clinical History and Information was obtained by:   Patient  Chief Complaint   Patient presents with   • Diarrhea       History is limited by: N/A    History of Present Illness:  Patient is a 87 y.o. year old female who presents to the emergency department for evaluation of diarrhea. Patient states she has had diarrhea for 4-5 days. She reports multiple episodes of diarrhea per day and associated nausea. She had some vomiting that has resolved. She has some cramping abdominal pain with the diarrhea, but she denies abdominal pain at this tie. She was seen for her symptoms twice at urgent care. She denies recent antibiotic use or travel. She reports no known recent sick contacts. Patient had negative viral testing at urgent care.      HPI    Patient Care Team  Primary Care Provider: Sameer Garsia DO    Past Medical History:     Allergies   Allergen Reactions   • Latex Unknown - Low Severity     Past Medical History:   Diagnosis Date   • Anxiety    • Arthritis    • Asthma    • Basal cell carcinoma     on nose   • Crohn's disease    • Depression    • Diverticulitis    • Gastric ulcer    • Hammer toe      Past Surgical History:   Procedure Laterality Date   • CHOLECYSTECTOMY     • COLONOSCOPY     • HEMORRHOIDECTOMY       Family History   Problem Relation Age of Onset   • Colon cancer Cousin    • Diabetes Daughter    • Hypertension Daughter    • Hypertension Son        Home Medications:  Prior to Admission medications    Medication Sig Start Date End Date Taking? Authorizing Provider   amLODIPine (NORVASC) 10 MG tablet Take 1 tablet by mouth Daily. 4/4/23   Sameer Garsia DO   aspirin 81 MG EC tablet Aspir-81 81 mg oral tablet,delayed release (DR/EC) take 1 tablet (81 mg) by oral route once daily   Active    Provider, MD Vane   benazepril (LOTENSIN) 40 MG tablet Take 1 tablet by mouth Daily. 4/4/23   Sameer Garsia  DO Eugenio   Calcium Carbonate-Vit D-Min (CALCIUM 1200 PO) Take 1,200 mg by mouth Daily.    Provider, MD Vane   chlorthalidone (HYGROTON) 25 MG tablet Take 1 tablet by mouth Daily for 90 days. 5/8/23 8/6/23  Sameer Garsia DO   clopidogrel (PLAVIX) 75 MG tablet Take 1 tablet by mouth Daily. 3/20/23   Sameer Garsia DO   estradiol (ESTRACE) 0.1 MG/GM vaginal cream Insert 2 g into the vagina Daily. 1/25/23   Lmeuel Pagan MD   hydrALAZINE (APRESOLINE) 25 MG tablet TAKE 1 TABLET 3 TIMES A DAY 4/4/23   Sameer Garsia DO   metoprolol tartrate (LOPRESSOR) 50 MG tablet Take 1 tablet by mouth 2 (Two) Times a Day for 90 days. 7/11/22 5/20/23  Sameer Garsia DO   ondansetron ODT (ZOFRAN-ODT) 4 MG disintegrating tablet Place 1 tablet on the tongue Every 8 (Eight) Hours As Needed for Nausea or Vomiting for up to 2 days. 5/17/23 5/20/23  Lotus Wylie APRN   pravastatin (PRAVACHOL) 20 MG tablet Take 1 tablet by mouth Every Night for 90 days. 3/20/23 6/18/23  Sameer Garsia DO   Probiotic Product (PROBIOTIC-10 PO) Take  by mouth.    Provider, MD Vane   sertraline (ZOLOFT) 100 MG tablet Take 1 tablet by mouth Daily for 90 days. 4/4/23 7/3/23  Sameer Garsia DO        Social History:   Social History     Tobacco Use   • Smoking status: Never     Passive exposure: Never   • Smokeless tobacco: Never   Vaping Use   • Vaping Use: Never used   Substance Use Topics   • Alcohol use: Not Currently   • Drug use: Never         Review of Systems:  Review of Systems   Constitutional: Negative for chills and fever.   HENT: Negative for congestion, ear pain and sore throat.    Eyes: Negative for pain.   Respiratory: Negative for cough, chest tightness and shortness of breath.    Cardiovascular: Negative for chest pain.   Gastrointestinal: Positive for abdominal pain (cramping), diarrhea and nausea. Negative for vomiting.   Genitourinary: Negative for flank pain  "and hematuria.   Musculoskeletal: Negative for joint swelling.   Skin: Negative for pallor.   Neurological: Negative for seizures and headaches.   All other systems reviewed and are negative.       Physical Exam:  /62   Pulse 65   Temp 98.2 °F (36.8 °C) (Oral)   Resp 16   Ht 157.5 cm (62\")   Wt 72.4 kg (159 lb 9.8 oz)   SpO2 95%   BMI 29.19 kg/m²     Physical Exam  Vitals and nursing note reviewed.   Constitutional:       General: She is not in acute distress.     Appearance: Normal appearance. She is not toxic-appearing.   HENT:      Head: Normocephalic and atraumatic.      Mouth/Throat:      Mouth: Mucous membranes are moist.   Eyes:      General: No scleral icterus.  Cardiovascular:      Rate and Rhythm: Normal rate and regular rhythm.      Pulses: Normal pulses.      Heart sounds: Normal heart sounds.   Pulmonary:      Effort: Pulmonary effort is normal. No respiratory distress.      Breath sounds: Normal breath sounds.   Abdominal:      General: Abdomen is flat.      Palpations: Abdomen is soft.      Tenderness: There is no abdominal tenderness.   Musculoskeletal:         General: Normal range of motion.      Cervical back: Normal range of motion and neck supple.   Skin:     General: Skin is warm and dry.   Neurological:      Mental Status: She is alert and oriented to person, place, and time. Mental status is at baseline.                  Procedures:  Procedures      Medical Decision Making:      Comorbidities that affect care:    Asthma    External Notes reviewed:    Previous Clinic Note: Urgent care PCP visits      The following orders were placed and all results were independently analyzed by me:  Orders Placed This Encounter   Procedures   • Enteric Bacterial Panel - Stool, Per Rectum   • Clostridioides difficile Toxin - Stool, Per Rectum   • Clostridioides difficile Toxin, PCR - Stool, Per Rectum   • [place] Draw   • Comprehensive Metabolic Panel   • Lipase   • Urinalysis With Microscopic If " Indicated (No Culture) - Urine, Clean Catch   • Lactic Acid, Plasma   • CBC Auto Differential   • Urinalysis, Microscopic Only - Urine, Clean Catch   • Undress & Gown   • CBC & Differential   • Green Top (Gel)   • Lavender Top   • Gold Top - SST   • Light Blue Top       Medications Given in the Emergency Department:  Medications   sodium chloride 0.9 % bolus 1,000 mL (0 mL Intravenous Stopped 5/20/23 2305)   ondansetron (ZOFRAN) injection 4 mg (4 mg Intravenous Given 5/20/23 2202)   dicyclomine (BENTYL) capsule 20 mg (20 mg Oral Given 5/20/23 2202)        ED Course:    The patient was initially evaluated in the triage area where orders were placed. The patient was later dispositioned by Leon Ghosh MD.      The patient was advised to stay for completion of workup which includes but is not limited to communication of labs and radiological results, reassessment and plan. The patient was advised that leaving prior to disposition by a provider could result in critical findings that are not communicated to the patient.     ED Course as of 05/21/23 0900   Sat May 20, 2023   1543 PROVIDER IN TRIAGE  Patient was evaluated by me in triage, Joshua Anaya PA-C.  Orders were placed and patient is currently awaiting final results and disposition.  [MD]   2200 Patient is well-appearing.  She appears moderately well-hydrated.  Given her elevated creatinine we will treat with IV fluids in the emergency department along with oral fluids.  I will provide the patient with symptomatic treatment and we discussed the importance of oral fluids at home.  She will follow-up with her PCP later this week to have her creatinine rechecked.  We discussed return precautions including worsening symptoms or any additional concerns. [JS]      ED Course User Index  [JS] Leon Gohsh MD  [MD] Joshua Anaya PA-C       Labs:    Lab Results (last 24 hours)     Procedure Component Value Units Date/Time    POC Rapid Strep A [[id number]]  (Normal)  Resulted: 05/20/23 1447    Specimen: Swab Updated: 05/20/23 1447     Rapid Strep A Screen Negative     Internal Control Passed     Lot Number #[id number]     Expiration Date 101,824    POC Influenza A/B [[id number]]  (Normal) Collected: 05/20/23 1459    Specimen: Swab Updated: 05/20/23 1459     Rapid Influenza A Ag Negative     Rapid Influenza B Ag Negative     Internal Control Passed     Lot Number [id number]     Expiration Date 111,824    POCT SARS-CoV-2 Antigen LÓPEZ   ([facility]) [[id number]]  (Normal) Collected: 05/20/23 1459    Specimen: Swab Updated: 05/20/23 1500     SARS Antigen Not Detected     Internal Control Passed     Lot Number [id number]     Expiration Date 100,223    CBC & Differential [[id number]]  (Normal) Collected: 05/20/23 1549    Specimen: Blood Updated: 05/20/23 1557    Narrative:      The following orders were created for panel order CBC & Differential.  Procedure                               Abnormality         Status                     ---------                               -----------         ------                     CBC Auto Differential[[id number]]        Normal              Final result                 Please view results for these tests on the individual orders.    Comprehensive Metabolic Panel [[id number]]  (Abnormal) Collected: 05/20/23 1549    Specimen: Blood Updated: 05/20/23 1626     Glucose 110 mg/dL      BUN 33 mg/dL      Creatinine 1.23 mg/dL      Sodium 140 mmol/L      Potassium 3.6 mmol/L      Chloride 100 mmol/L      CO2 26.7 mmol/L      Calcium 9.7 mg/dL      Total Protein 7.1 g/dL      Albumin 4.2 g/dL      ALT (SGPT) 14 U/L      AST (SGOT) 19 U/L      Alkaline Phosphatase 90 U/L      Total Bilirubin 0.8 mg/dL      Globulin 2.9 gm/dL      A/G Ratio 1.4 g/dL      BUN/Creatinine Ratio 26.8     Anion Gap 13.3 mmol/L      eGFR 43.2 mL/min/1.73     Narrative:      GFR Normal >60  Chronic Kidney Disease <60  Kidney Failure <15    The GFR formula is only valid for  adults with stable renal function between ages 18 and 70.    Lipase [[id number]]  (Abnormal) Collected: 05/20/23 1549    Specimen: Blood Updated: 05/20/23 1626     Lipase 10 U/L     Lactic Acid, Plasma [[id number]]  (Normal) Collected: 05/20/23 1549    Specimen: Blood Updated: 05/20/23 1624     Lactate 0.7 mmol/L     CBC Auto Differential [[id number]]  (Normal) Collected: 05/20/23 1549    Specimen: Blood Updated: 05/20/23 1557     WBC 8.55 10*3/mm3      RBC 4.44 10*6/mm3      Hemoglobin 14.5 g/dL      Hematocrit 42.7 %      MCV 96.2 fL      MCH 32.7 pg      MCHC 34.0 g/dL      RDW 12.8 %      RDW-SD 45.4 fl      MPV 10.9 fL      Platelets 167 10*3/mm3      Neutrophil % 65.5 %      Lymphocyte % 23.4 %      Monocyte % 9.4 %      Eosinophil % 0.9 %      Basophil % 0.4 %      Immature Grans % 0.4 %      Neutrophils, Absolute 5.61 10*3/mm3      Lymphocytes, Absolute 2.00 10*3/mm3      Monocytes, Absolute 0.80 10*3/mm3      Eosinophils, Absolute 0.08 10*3/mm3      Basophils, Absolute 0.03 10*3/mm3      Immature Grans, Absolute 0.03 10*3/mm3      nRBC 0.0 /100 WBC     Urinalysis With Microscopic If Indicated (No Culture) - Urine, Clean Catch [[id number]]  (Abnormal) Collected: 05/20/23 1916    Specimen: Urine, Clean Catch Updated: 05/20/23 1933     Color, UA Yellow     Appearance, UA Clear     pH, UA <=5.0     Specific Gravity, UA 1.015     Glucose, UA Negative     Ketones, UA 15 mg/dL (1+)     Bilirubin, UA Negative     Blood, UA Negative     Protein, UA Negative     Leuk Esterase, UA Small (1+)     Nitrite, UA Negative     Urobilinogen, UA 0.2 E.U./dL    Urinalysis, Microscopic Only - Urine, Clean Catch [[id number]]  (Abnormal) Collected: 05/20/23 1916    Specimen: Urine, Clean Catch Updated: 05/20/23 1940     RBC, UA 0-2 /HPF      WBC, UA 6-12 /HPF      Bacteria, UA Trace /HPF      Squamous Epithelial Cells, UA 3-6 /HPF      Hyaline Casts, UA 0-2 /LPF      Mucus, UA Trace /HPF      Methodology Manual Light Microscopy            Imaging:    No Radiology Exams Resulted Within Past 24 Hours      Differential Diagnosis and Discussion:      Diarrhea: Differential diagnosis includes but is not limited to malabsorption syndrome, bacterial infection, carcinoid syndrome, pancreatic hypersecretion, viral infection, celiac sprue, Crohn's disease, ulcerative colitis, ischemic colitis, colitis, hypermotility, and irritable bowel syndrome.    All labs were reviewed and interpreted by me.    [facility]         Patient Care Considerations:    CT ABDOMEN AND PELVIS: I considered ordering a CT scan of the abdomen and pelvis however Abdomen soft and nontender      Consultants/Shared Management Plan:    None    Social Determinants of Health:    Patient is independent, reliable, and has access to care.       Disposition and Care Coordination:    Discharged: The patient is suitable and stable for discharge with no need for consideration of observation or admission.    I have explained the patient´s condition, diagnoses and treatment plan based on the information available to me at this time. I have answered questions and addressed any concerns. The patient has a good  understanding of the patient´s diagnosis, condition, and treatment plan as can be expected at this point. The vital signs have been stable. The patient´s condition is stable and appropriate for discharge from the emergency department.      The patient will pursue further outpatient evaluation with the primary care physician or other designated or consulting physician as outlined in the discharge instructions. They are agreeable to this plan of care and follow-up instructions have been explained in detail. The patient has received these instructions in written format and have expressed an understanding of the discharge instructions. The patient is aware that any significant change in condition or worsening of symptoms should prompt an immediate return to this or the closest emergency department or  call to 911.  I have explained discharge medications and the need for follow up with the patient/caretakers. This was also printed in the discharge instructions. Patient was discharged with the following medications and follow up:      Medication List      New Prescriptions    dicyclomine 10 MG capsule  Commonly known as: BENTYL  Take 1 capsule by mouth 3 (Three) Times a Day As Needed (abdominal cramping).        ASK your doctor about these medications    ondansetron ODT 4 MG disintegrating tablet  Commonly known as: ZOFRAN-ODT  Place 1 tablet on the tongue Every 8 (Eight) Hours As Needed for Nausea or Vomiting for up to 2 days.  Ask about: Should I take this medication?           Where to Get Your Medications      These medications were sent to [facility] [id number] - [place], [place] - [address] AT [facility] [address] - [phone]  - [phone] FX  [address] [place] [address] [place] [address]    Phone: [phone]   · dicyclomine 10 MG capsule      Sameer Garsia, DO  [address] [facility] [place] [address]  [phone]    Schedule an appointment as soon as possible for a visit          Final diagnoses:   Diarrhea, unspecified type   AGE (acute gastroenteritis)        ED Disposition     ED Disposition   Discharge    Condition   Stable    Comment   --             This medical record created using voice recognition software.    Documentation assistance provided by Esvin Willis acting as scribe for Leon Ghosh MD. Information recorded by the scribe was done at my direction and has been verified and validated by me.           Esvin Willis  05/20/23 6337       Leon Ghosh MD  05/21/23 8961

## 2023-05-21 NOTE — DISCHARGE INSTRUCTIONS
Please follow-up with your primary care provider to have your creatinine level rechecked this week.    Please drink plenty of fluids.

## 2023-05-25 ENCOUNTER — OFFICE VISIT (OUTPATIENT)
Dept: FAMILY MEDICINE CLINIC | Facility: CLINIC | Age: 88
End: 2023-05-25
Payer: MEDICARE

## 2023-05-25 VITALS
TEMPERATURE: 98.2 F | SYSTOLIC BLOOD PRESSURE: 179 MMHG | BODY MASS INDEX: 28.71 KG/M2 | HEART RATE: 59 BPM | HEIGHT: 62 IN | WEIGHT: 156 LBS | OXYGEN SATURATION: 96 % | DIASTOLIC BLOOD PRESSURE: 54 MMHG

## 2023-05-25 DIAGNOSIS — Z09 ENCOUNTER FOR EXAMINATION FOLLOWING TREATMENT AT HOSPITAL: ICD-10-CM

## 2023-05-25 DIAGNOSIS — I10 ESSENTIAL HYPERTENSION: ICD-10-CM

## 2023-05-25 DIAGNOSIS — J30.1 SEASONAL ALLERGIC RHINITIS DUE TO POLLEN: Primary | ICD-10-CM

## 2023-05-25 RX ORDER — FLUTICASONE PROPIONATE 50 MCG
2 SPRAY, SUSPENSION (ML) NASAL DAILY
Qty: 18.2 ML | Refills: 0 | Status: SHIPPED | OUTPATIENT
Start: 2023-05-25

## 2023-05-25 NOTE — PROGRESS NOTES
"Chief Complaint  Hospital Follow Up Visit (diarrhea) and Shortness of Breath    Subjective      Kristine Rahman presents to [facility]  History of Present Illness    Kristine is here for a follow up for viral illness.   Symptoms do seem to be getting better  Diarrhea is better, had a normal bowel movement this morning   No further abdominal pain   She has an occasional cough with some congestion - she thinks that its maybe allergies  She denies chest pain, shortness of breath or difficulty breathing      Current Outpatient Medications   Medication Instructions   • amLODIPine (NORVASC) 10 mg, Oral, Daily   • aspirin 81 MG EC tablet Aspir-81 81 mg oral tablet,delayed release (DR/EC) take 1 tablet (81 mg) by oral route once daily   Active   • benazepril (LOTENSIN) 40 mg, Oral, Daily   • Calcium Carbonate-Vit D-Min (CALCIUM 1200 PO) 1,200 mg, Oral, Daily   • chlorthalidone (HYGROTON) 25 mg, Oral, Daily   • clopidogrel (PLAVIX) 75 mg, Oral, Daily   • dicyclomine (BENTYL) 10 mg, Oral, 3 Times Daily PRN   • estradiol (ESTRACE) 2 g, Vaginal, Daily   • fluticasone (FLONASE) 50 MCG/ACT nasal spray 2 sprays, Nasal, Daily   • hydrALAZINE (APRESOLINE) 25 MG tablet TAKE 1 TABLET 3 TIMES A DAY   • metoprolol tartrate (LOPRESSOR) 50 mg, Oral, 2 Times Daily   • pravastatin (PRAVACHOL) 20 mg, Oral, Nightly   • Probiotic Product (PROBIOTIC-10 PO) Oral   • sertraline (ZOLOFT) 100 mg, Oral, Daily       The following portions of the patient's history were reviewed and updated as appropriate: allergies, current medications, past family history, past medical history, past social history, past surgical history, and problem list.    Objective   Vital Signs:   /54 (BP Location: Right arm, Patient Position: Sitting, Cuff Size: Adult)   Pulse 59   Temp 98.2 °F (36.8 °C) (Oral)   Ht 157.5 cm (62\")   Wt 70.8 kg (156 lb)   SpO2 96%   BMI 28.53 kg/m²     Wt Readings from Last 3 Encounters:   05/25/23 70.8 " kg (156 lb)   05/20/23 72.4 kg (159 lb 9.8 oz)   05/20/23 74.4 kg (164 lb)     BP Readings from Last 3 Encounters:   05/25/23 179/54   05/20/23 152/62   05/20/23 139/55     Physical Exam  Vitals reviewed.   Constitutional:       Appearance: Normal appearance. She is well-developed.   HENT:      Head: Normocephalic and atraumatic.      Mouth/Throat:      Pharynx: No oropharyngeal exudate.   Eyes:      Conjunctiva/sclera: Conjunctivae normal.      Pupils: Pupils are equal, round, and reactive to light.   Cardiovascular:      Rate and Rhythm: Normal rate and regular rhythm.      Heart sounds: No murmur heard.    No friction rub. No gallop.   Pulmonary:      Effort: Pulmonary effort is normal.      Breath sounds: Normal breath sounds. No wheezing or rhonchi.   Abdominal:      General: Bowel sounds are normal.      Palpations: Abdomen is soft.   Skin:     General: Skin is warm and dry.   Neurological:      Mental Status: She is alert and oriented to person, place, and time.   Psychiatric:         Mood and Affect: Affect normal.          Result Review :  The following data was reviewed by: ROCIO Becerra on 05/25/2023:      Common labs        11/16/2022    11:16 4/11/2023    11:56 5/20/2023    15:49   Common Labs   Glucose 88   105   110     BUN 27   25   33     Creatinine 1.03   0.97   1.23     Sodium 143   143   140     Potassium 4.5   3.8   3.6     Chloride 104   103   100     Calcium 9.9   10.4   9.7     Albumin 4.00    4.2     Total Bilirubin 0.5    0.8     Alkaline Phosphatase 82    90     AST (SGOT) 16    19     ALT (SGPT) 9    14     WBC   8.55     Hemoglobin   14.5     Hematocrit   42.7     Platelets   167     Total Cholesterol 134       Triglycerides 97       HDL Cholesterol 55       LDL Cholesterol  61       Hemoglobin A1C 5.40   5.80          Lab Results (last 72 hours)     ** No results found for the last 72 hours. **           No Images in the past 120 days found..    Lab Results   Component Value Date     SARSANTIGEN Not Detected 05/20/2023    RAPFLUA Negative 05/20/2023    RAPFLUB Negative 05/20/2023    RAPSCRN Negative 05/20/2023    BILIRUBINUR Negative 05/20/2023       Procedures        Assessment and Plan   Diagnoses and all orders for this visit:    1. Seasonal allergic rhinitis due to pollen (Primary)  Comments:  Will try flonase to help with congestion and cough - assessment unremarkable   Orders:  -     fluticasone (FLONASE) 50 MCG/ACT nasal spray; 2 sprays into the nostril(s) as directed by provider Daily.  Dispense: 18.2 mL; Refill: 0    2. Encounter for examination following treatment at hospital  Comments:  Reviewed ER notes - doing well now.    3. Essential hypertension  Comments:  Elevated in office today. Patient reports that she hasn't taken her meds today - discussed the importance of compliance on timing of meds. Med list printed           There are no discontinued medications.       Follow Up   Return if symptoms worsen or fail to improve.  Patient was given instructions and counseling regarding her condition or for health maintenance advice. Please see specific information pulled into the AVS if appropriate.       Fabby Hooper, ROCIO  05/26/23  08:21 EDT

## 2023-07-07 ENCOUNTER — TELEPHONE (OUTPATIENT)
Dept: PULMONOLOGY | Facility: CLINIC | Age: 88
End: 2023-07-07

## 2023-07-07 NOTE — TELEPHONE ENCOUNTER
Caller: TRISTON PARTIDA    Relationship to patient: Emergency Contact    Best call back number: [phone]    Patient is needing: PT'S DAUGHTER CALLED ABOUT PT'S IN OFFICE PROCEDURE THAT KEEPS BEING CANCELED WANTING TO SEE WHAT THE DR WANTS TO BE DONE

## 2023-07-28 RX ORDER — METOPROLOL TARTRATE 50 MG/1
TABLET, FILM COATED ORAL
Qty: 180 TABLET | Refills: 3 | Status: SHIPPED | OUTPATIENT
Start: 2023-07-28

## 2023-08-10 ENCOUNTER — PROCEDURE VISIT (OUTPATIENT)
Dept: UROLOGY | Facility: CLINIC | Age: 88
End: 2023-08-10
Payer: MEDICARE

## 2023-08-10 DIAGNOSIS — N39.3 STRESS INCONTINENCE: Primary | ICD-10-CM

## 2023-08-10 DIAGNOSIS — N39.41 URGE INCONTINENCE: ICD-10-CM

## 2023-08-16 ENCOUNTER — OFFICE VISIT (OUTPATIENT)
Dept: UROLOGY | Facility: CLINIC | Age: 88
End: 2023-08-16
Payer: MEDICARE

## 2023-08-16 VITALS
SYSTOLIC BLOOD PRESSURE: 157 MMHG | HEIGHT: 62 IN | TEMPERATURE: 97.8 F | HEART RATE: 59 BPM | DIASTOLIC BLOOD PRESSURE: 50 MMHG | WEIGHT: 156 LBS | BODY MASS INDEX: 28.71 KG/M2

## 2023-08-16 DIAGNOSIS — N39.41 URGE INCONTINENCE: Primary | ICD-10-CM

## 2023-08-16 DIAGNOSIS — R32 ENURESIS: ICD-10-CM

## 2023-08-16 DIAGNOSIS — Z79.899 HIGH RISK MEDICATION USE: ICD-10-CM

## 2023-08-16 DIAGNOSIS — N95.2 ATROPHIC VAGINITIS: ICD-10-CM

## 2023-08-16 LAB
BILIRUB BLD-MCNC: NEGATIVE MG/DL
CLARITY, POC: CLEAR
COLOR UR: YELLOW
EXPIRATION DATE: NORMAL
GLUCOSE UR STRIP-MCNC: NEGATIVE MG/DL
KETONES UR QL: NEGATIVE
LEUKOCYTE EST, POC: NEGATIVE
Lab: NORMAL
NITRITE UR-MCNC: NEGATIVE MG/ML
PH UR: 5.5 [PH] (ref 5–8)
PROT UR STRIP-MCNC: NEGATIVE MG/DL
RBC # UR STRIP: NEGATIVE /UL
SP GR UR: 1.02 (ref 1–1.03)
UROBILINOGEN UR QL: NORMAL

## 2023-08-16 PROCEDURE — 3077F SYST BP >= 140 MM HG: CPT | Performed by: UROLOGY

## 2023-08-16 PROCEDURE — 81003 URINALYSIS AUTO W/O SCOPE: CPT | Performed by: UROLOGY

## 2023-08-16 PROCEDURE — 99213 OFFICE O/P EST LOW 20 MIN: CPT | Performed by: UROLOGY

## 2023-08-16 PROCEDURE — 3078F DIAST BP <80 MM HG: CPT | Performed by: UROLOGY

## 2023-08-16 RX ORDER — DESMOPRESSIN ACETATE 0.2 MG/1
0.2 TABLET ORAL NIGHTLY
Qty: 30 TABLET | Refills: 2 | Status: SHIPPED | OUTPATIENT
Start: 2023-08-16

## 2023-08-16 NOTE — PROGRESS NOTES
"Chief Complaint  Urinary Incontinence and Follow-up (Urodynamic results)    Enuresis    Subjective patient is still distressed because of incontinence.        Kristine Rahman presents to [facility] UROLOGY  History of Present Illness    87-year-old white female continues to have leakage at night.  Patient has urinary urgency about 50% of the time and incontinence 3 times a day.  According the patient it does not bother her in the daytime but at nighttime she gets wet 4 times.  In the daytime she has only few drops and does not use any pads but at nighttime she gets drenched.  She also has nocturia 4 times and more volume comes out at night than in the daytime.    We went over her urodynamic testings.  She has no stress urinary incontinence.  She does have overactive urinary bladder.  And she has urgency incontinence.  We have used Ditropan XL and Gemtesa on the patient not done them has helped her.  Patient has no urinary tract infection.    Objective no acute distress  Vital Signs:   /50   Pulse 59   Temp 97.8 øF (36.6 øC)   Ht 157.5 cm (62\")   Wt 70.8 kg (156 lb)   BMI 28.53 kg/mý     Allergies   Allergen Reactions    Latex Unknown - Low Severity      Past medical history:  has a past medical history of Anxiety, Arthritis, Asthma, Basal cell carcinoma, Crohn's disease, Depression, Diverticulitis, Gastric ulcer, and Hammer toe.   Past surgical history:  has a past surgical history that includes Colonoscopy; Hemorrhoid surgery; and Cholecystectomy.  Personal history: family history includes Colon cancer in her cousin; Diabetes in her daughter; Hypertension in her daughter and son.  Social history:  reports that she has never smoked. She has never been exposed to tobacco smoke. She has never used smokeless tobacco. She reports that she does not currently use alcohol. She reports that she does not use drugs.    Review of Systems    Please see past medical surgical history rest of the " system is negative.    Physical Exam  Constitutional:       General: She is not in acute distress.     Appearance: Normal appearance. She is obese. She is not ill-appearing or toxic-appearing.   HENT:      Head: Normocephalic and atraumatic.      Ears:      Comments: No loss of hearing  Pulmonary:      Effort: Pulmonary effort is normal. No respiratory distress.   Musculoskeletal:         General: Normal range of motion.   Skin:     General: Skin is warm.      Coloration: Skin is not jaundiced.   Neurological:      General: No focal deficit present.      Mental Status: She is alert and oriented to person, place, and time.      Motor: No weakness.      Gait: Gait normal.   Psychiatric:         Mood and Affect: Mood normal.         Behavior: Behavior normal.         Thought Content: Thought content normal.         Judgment: Judgment normal.      Result Review :                 Assessment and Plan    Diagnoses and all orders for this visit:    1. Urge incontinence (Primary)  -     POC Urinalysis Dipstick, Automated  -     desmopressin (DDAVP) 0.2 MG tablet; Take 1 tablet by mouth Every Night.  Dispense: 30 tablet; Refill: 2    2. Atrophic vaginitis  -     desmopressin (DDAVP) 0.2 MG tablet; Take 1 tablet by mouth Every Night.  Dispense: 30 tablet; Refill: 2    3. Enuresis  -     desmopressin (DDAVP) 0.2 MG tablet; Take 1 tablet by mouth Every Night.  Dispense: 30 tablet; Refill: 2    4. High risk medication use  -     Comprehensive Metabolic Panel; Future    Since oral medication are not helping her, I discussed Botox injection in the urinary bladder.  The other alternative is using DDAVP at night and see if that will relieve her incontinence at nighttime because she has more volume at night with urination than in the daytime.  She is agreeable to try it and will start her on DDAVP 0.2Milligram at bedtime and see her in 6 weeks.  We will do CMP before seeing her to make sure that her sodium is normal.  Patient is  accompanied with her son and they both understand what is going on.     Brief Urine Lab Results  (Last result in the past 365 days)        Color   Clarity   Blood   Leuk Est   Nitrite   Protein   CREAT   Urine HCG        08/16/23 1005 Yellow   Clear   Negative   Negative   Negative   Negative                    Follow Up   No follow-ups on file.  Patient was given instructions and counseling regarding her condition or for health maintenance advice. Please see specific information pulled into the AVS if appropriate.     Lemuel Pagan MD

## 2023-10-03 ENCOUNTER — OFFICE VISIT (OUTPATIENT)
Dept: UROLOGY | Facility: CLINIC | Age: 88
End: 2023-10-03
Payer: MEDICARE

## 2023-10-03 VITALS
HEIGHT: 62 IN | BODY MASS INDEX: 29.88 KG/M2 | WEIGHT: 162.4 LBS | SYSTOLIC BLOOD PRESSURE: 153 MMHG | HEART RATE: 52 BPM | DIASTOLIC BLOOD PRESSURE: 48 MMHG

## 2023-10-03 DIAGNOSIS — N39.41 URGE INCONTINENCE: Primary | ICD-10-CM

## 2023-10-03 DIAGNOSIS — R35.0 URINARY FREQUENCY: ICD-10-CM

## 2023-10-03 DIAGNOSIS — R32 ENURESIS: ICD-10-CM

## 2023-10-03 LAB
BACTERIA UR QL AUTO: NORMAL /HPF
BILIRUB BLD-MCNC: NEGATIVE MG/DL
CLARITY, POC: CLEAR
COLOR UR: YELLOW
EPI CELLS #/AREA URNS HPF: 0 /[HPF]
EXPIRATION DATE: ABNORMAL
GLUCOSE UR STRIP-MCNC: NEGATIVE MG/DL
KETONES UR QL: NEGATIVE
LEUKOCYTE EST, POC: NEGATIVE
Lab: ABNORMAL
NITRITE UR-MCNC: NEGATIVE MG/ML
PH UR: 7 [PH] (ref 5–8)
PROT UR STRIP-MCNC: NEGATIVE MG/DL
RBC # UR STRIP: ABNORMAL /UL
RBC # UR STRIP: NORMAL /HPF
RENAL EPITHELIAL, POC: 0
SP GR UR: 1.02 (ref 1–1.03)
UNIDENT CRYS URNS QL MICRO: NORMAL /HPF
UROBILINOGEN UR QL: ABNORMAL
WBC # UR STRIP: NORMAL /HPF

## 2023-10-03 PROCEDURE — 87086 URINE CULTURE/COLONY COUNT: CPT | Performed by: UROLOGY

## 2023-10-03 NOTE — PROGRESS NOTES
"Chief Complaint  Follow-up (Urge incontinence follow/up)    Urinary frequency    Nocturia    Subjective patient is uncomfortable with incontinence        Kristine Rahman presents to [facility] UROLOGY  History of Present Illness    87-year-old white female continues to have urinary incontinence especially at nighttime.  She has to change depends about 3-4 times at night and she gets up about 3-4 times at night.  She has no dysuria or gross hematuria.  She is also leaking in the daytime but is not too bad.    Patient has been on Ditropan XL, Gemtesa and DDAVP without any help.    Objective no acute distress  Vital Signs:   /48 (BP Location: Right arm, Patient Position: Sitting, Cuff Size: Adult)   Pulse 52   Ht 157.5 cm (62.01\")   Wt 73.7 kg (162 lb 6.4 oz)   BMI 29.70 kg/m²     Allergies   Allergen Reactions    Latex Unknown - Low Severity      Past medical history:  has a past medical history of Anxiety, Arthritis, Asthma, Basal cell carcinoma, Crohn's disease, Depression, Diverticulitis, Gastric ulcer, and Hammer toe.   Past surgical history:  has a past surgical history that includes Colonoscopy; Hemorrhoid surgery; and Cholecystectomy.  Personal history: family history includes Colon cancer in her cousin; Diabetes in her daughter; Hypertension in her daughter and son.  Social history:  reports that she has never smoked. She has never been exposed to tobacco smoke. She has never used smokeless tobacco. She reports that she does not currently use alcohol. She reports that she does not use drugs.    Review of Systems    No change from before    Physical Exam  Constitutional:       General: She is not in acute distress.     Appearance: Normal appearance. She is obese. She is not ill-appearing or toxic-appearing.   HENT:      Head: Normocephalic and atraumatic.      Ears:      Comments: No loss of hearing  Abdominal:      Palpations: Abdomen is soft. There is no mass.      Tenderness: " There is no abdominal tenderness. There is no right CVA tenderness or left CVA tenderness.   Musculoskeletal:         General: No swelling. Normal range of motion.   Skin:     General: Skin is warm.      Coloration: Skin is not jaundiced.   Neurological:      General: No focal deficit present.      Mental Status: She is alert and oriented to person, place, and time.      Motor: No weakness.      Gait: Gait normal.   Psychiatric:         Mood and Affect: Mood normal.         Behavior: Behavior normal.         Thought Content: Thought content normal.         Judgment: Judgment normal.      Result Review :                 Assessment and Plan    Diagnoses and all orders for this visit:    1. Urge incontinence (Primary)  -     POC Urinalysis Dipstick, Automated  -     POC Urine Microscopic Only  -     Urine Culture - Urine, Urine, Clean Catch    2. Enuresis  -     POC Urine Microscopic Only  -     Urine Culture - Urine, Urine, Clean Catch    3. Urinary frequency  -     POC Urine Microscopic Only  -     Urine Culture - Urine, Urine, Clean Catch    Conservative treatment is failed.  I will going to do a urine culture on the patient and see if he can treat her with Botox in the urinary bladder.     Brief Urine Lab Results  (Last result in the past 365 days)        Color   Clarity   Blood   Leuk Est   Nitrite   Protein   CREAT   Urine HCG        10/03/23 1019 Yellow   Clear   Small   Negative   Negative   Negative                    Follow Up   No follow-ups on file.  Patient was given instructions and counseling regarding her condition or for health maintenance advice. Please see specific information pulled into the AVS if appropriate.     Lemuel Pagan MD

## 2023-10-05 LAB — BACTERIA SPEC AEROBE CULT: NO GROWTH

## 2023-10-13 ENCOUNTER — TELEPHONE (OUTPATIENT)
Dept: UROLOGY | Facility: CLINIC | Age: 88
End: 2023-10-13

## 2023-10-13 NOTE — TELEPHONE ENCOUNTER
Caller: TRISTON HELGA     Best call back number: [phone]      Who are you requesting to speak with (clinical staff, provider,  specific staff member): ROBLES     What was the call regarding: PATIENT WAS TOLD SHE WOULD BE RECEIVING A CALL BACK BUT HAS NOT YET.

## 2023-11-15 ENCOUNTER — TELEPHONE (OUTPATIENT)
Dept: UROLOGY | Facility: CLINIC | Age: 88
End: 2023-11-15
Payer: MEDICARE

## 2023-12-11 ENCOUNTER — TELEPHONE (OUTPATIENT)
Dept: UROLOGY | Facility: CLINIC | Age: 88
End: 2023-12-11
Payer: MEDICARE

## 2023-12-11 NOTE — TELEPHONE ENCOUNTER
Alexandre Pagan,    When you saw pt on 8/16 you ordered a CMP. Pt never had this lab drawn. You saw her again on 10/3, didn't reorder lab.     Do you still want her to have CMP drawn or is it ok to cancel order?     Please advise- clr    Office Visit with Lemuel Pagan MD (08/16/2023)  Office Visit with Lemuel Pagan MD (10/03/2023)

## 2023-12-26 ENCOUNTER — OFFICE VISIT (OUTPATIENT)
Dept: FAMILY MEDICINE CLINIC | Facility: CLINIC | Age: 88
End: 2023-12-26
Payer: MEDICARE

## 2023-12-26 VITALS
HEART RATE: 64 BPM | WEIGHT: 162.8 LBS | SYSTOLIC BLOOD PRESSURE: 170 MMHG | TEMPERATURE: 97.8 F | BODY MASS INDEX: 29.96 KG/M2 | HEIGHT: 62 IN | DIASTOLIC BLOOD PRESSURE: 74 MMHG | OXYGEN SATURATION: 95 %

## 2023-12-26 DIAGNOSIS — I10 ESSENTIAL HYPERTENSION: Primary | ICD-10-CM

## 2023-12-26 PROBLEM — R73.03 PRE-DIABETES: Status: RESOLVED | Noted: 2021-07-09 | Resolved: 2023-12-26

## 2023-12-26 PROCEDURE — 99213 OFFICE O/P EST LOW 20 MIN: CPT | Performed by: FAMILY MEDICINE

## 2023-12-26 RX ORDER — HYDRALAZINE HYDROCHLORIDE 50 MG/1
50 TABLET, FILM COATED ORAL 3 TIMES DAILY
Qty: 270 TABLET | Refills: 1 | Status: SHIPPED | OUTPATIENT
Start: 2023-12-26

## 2023-12-26 NOTE — ASSESSMENT & PLAN NOTE
Her blood pressure is elevated here as well as at home.  Will adjust her medication for better control.

## 2023-12-26 NOTE — PROGRESS NOTES
Chief Complaint   Patient presents with    Hypertension     Pt states that BP has been elevated recently, pt takes this at home and it has averaged around 170-160s/60-40s. Pt brought BP log with her today.    Follow-up        Subjective     Kristine Rahman  has a past medical history of Anxiety, Arthritis, Asthma, Basal cell carcinoma, Crohn's disease, Depression, Diverticulitis, Gastric ulcer, and Hammer toe.    Hypertension-she does check her blood pressure at home.  Her home blood pressure readings have also been elevated like here today.  She has been taking all of her meds as prescribed.        PHQ-2 Depression Screening  Little interest or pleasure in doing things?     Feeling down, depressed, or hopeless?     PHQ-2 Total Score     PHQ-9 Depression Screening  Little interest or pleasure in doing things?     Feeling down, depressed, or hopeless?     Trouble falling or staying asleep, or sleeping too much?     Feeling tired or having little energy?     Poor appetite or overeating?     Feeling bad about yourself - or that you are a failure or have let yourself or your family down?     Trouble concentrating on things, such as reading the newspaper or watching television?     Moving or speaking so slowly that other people could have noticed? Or the opposite - being so fidgety or restless that you have been moving around a lot more than usual?     Thoughts that you would be better off dead, or of hurting yourself in some way?     PHQ-9 Total Score     If you checked off any problems, how difficult have these problems made it for you to do your work, take care of things at home, or get along with other people?       Allergies   Allergen Reactions    Latex Unknown - Low Severity       Prior to Admission medications    Medication Sig Start Date End Date Taking? Authorizing Provider   amLODIPine (NORVASC) 10 MG tablet Take 1 tablet by mouth Daily. 7/6/23  Yes Sameer Garsia, DO   aspirin 81 MG EC tablet  Aspir-81 81 mg oral tablet,delayed release (DR/EC) take 1 tablet (81 mg) by oral route once daily   Active   Yes Vane Greenberg MD   benazepril (LOTENSIN) 40 MG tablet Take 1 tablet by mouth Daily. 7/6/23  Yes Sameer Garsia DO   Calcium Carbonate-Vit D-Min (CALCIUM 1200 PO) Take 1,200 mg by mouth Daily.   Yes Vane Greenberg MD   clopidogrel (PLAVIX) 75 MG tablet Take 1 tablet by mouth Daily. 3/20/23  Yes Sameer Garsia DO   desmopressin (DDAVP) 0.2 MG tablet Take 1 tablet by mouth Every Night. 8/16/23  Yes Lemuel Pagan MD   dicyclomine (BENTYL) 10 MG capsule Take 1 capsule by mouth 3 (Three) Times a Day As Needed (abdominal cramping). 5/20/23  Yes Leon Ghosh MD   estradiol (ESTRACE) 0.1 MG/GM vaginal cream Insert 2 g into the vagina Daily. 1/25/23  Yes Lemuel Pagan MD   fluticasone (FLONASE) 50 MCG/ACT nasal spray 2 sprays into the nostril(s) as directed by provider Daily. 5/25/23  Yes Fabby Hooper APRN   hydrALAZINE (APRESOLINE) 25 MG tablet TAKE 1 TABLET 3 TIMES A DAY 4/4/23  Yes Sameer Garsia DO   metoprolol tartrate (LOPRESSOR) 50 MG tablet TAKE 1 TABLET TWICE A DAY  (NEW DOSAGE) 7/28/23  Yes Sameer Garsia DO   mupirocin (BACTROBAN) 2 % ointment Apply 1 application  topically to the appropriate area as directed 2 (Two) Times a Day. 9/28/23  Yes Vane Greenberg MD   Probiotic Product (PROBIOTIC-10 PO) Take  by mouth.   Yes Vane Greenberg MD   chlorthalidone (HYGROTON) 25 MG tablet Take 1 tablet by mouth Daily for 90 days. 5/8/23 10/3/23  Sameer Garsia DO   pravastatin (PRAVACHOL) 20 MG tablet Take 1 tablet by mouth Every Night for 90 days. 7/6/23 10/4/23  Sameer Garsia DO   sertraline (ZOLOFT) 100 MG tablet Take 1 tablet by mouth Daily for 90 days. 7/6/23 10/4/23  Sameer Garsia,         Patient Active Problem List   Diagnosis    Anxiety    Arthritis    Asthma    Colon polyps     "Crohn's disease    Depression    Diverticulitis    Essential hypertension    Gastric ulcer    Hyperlipemia    Seasonal allergic rhinitis    Shortness of breath    Ulcerative lesion    Urinary incontinence without sensory awareness    COVID-19 virus detected    Lesion of skin of nose    Thyroid nodule    Medicare annual wellness visit, subsequent    Need for tetanus, diphtheria, and acellular pertussis (Tdap) vaccine    Need for shingles vaccine        Past Surgical History:   Procedure Laterality Date    CHOLECYSTECTOMY      COLONOSCOPY      HEMORRHOIDECTOMY         Social History     Socioeconomic History    Marital status:     Number of children: 3   Tobacco Use    Smoking status: Never     Passive exposure: Never    Smokeless tobacco: Never   Vaping Use    Vaping Use: Never used   Substance and Sexual Activity    Alcohol use: Not Currently    Drug use: Never    Sexual activity: Not Currently     Birth control/protection: Post-menopausal       Family History   Problem Relation Age of Onset    Colon cancer Cousin     Diabetes Daughter     Hypertension Daughter     Hypertension Son        Family history, surgical history, past medical history, Allergies and meds reviewed with patient today and updated in mobli EMR.     ROS:  Review of Systems   Constitutional:  Positive for fatigue.   Respiratory:  Positive for shortness of breath. Negative for cough, chest tightness and wheezing.    Cardiovascular:  Negative for chest pain and palpitations.   Neurological:  Negative for headache.       OBJECTIVE:  Vitals:    12/26/23 1132   BP: 170/74   BP Location: Left arm   Patient Position: Sitting   Cuff Size: Adult   Pulse: 64   Temp: 97.8 °F (36.6 °C)   TempSrc: Temporal   SpO2: 95%   Weight: 73.8 kg (162 lb 12.8 oz)   Height: 157.5 cm (62\")     No results found.   Body mass index is 29.78 kg/m².  No LMP recorded. Patient is postmenopausal.    Physical Exam  Vitals and nursing note reviewed.   Constitutional:       " General: She is not in acute distress.     Appearance: Normal appearance. She is normal weight.   HENT:      Head: Normocephalic.   Cardiovascular:      Rate and Rhythm: Normal rate and regular rhythm.      Heart sounds: Normal heart sounds. No murmur heard.  Pulmonary:      Effort: Pulmonary effort is normal.      Breath sounds: Normal breath sounds. No wheezing.   Neurological:      Mental Status: She is alert and oriented to person, place, and time.   Psychiatric:         Mood and Affect: Mood normal.         Thought Content: Thought content normal.         Judgment: Judgment normal.         Procedures    No visits with results within 30 Day(s) from this visit.   Latest known visit with results is:   Office Visit on 10/03/2023   Component Date Value Ref Range Status    Color 10/03/2023 Yellow  Yellow, Straw, Dark Yellow, Tori Final    Clarity, UA 10/03/2023 Clear  Clear Final    Specific Gravity  10/03/2023 1.020  1.005 - 1.030 Final    pH, Urine 10/03/2023 7.0  5.0 - 8.0 Final    Leukocytes 10/03/2023 Negative  Negative Final    Nitrite, UA 10/03/2023 Negative  Negative Final    Protein, POC 10/03/2023 Negative  Negative mg/dL Final    Glucose, UA 10/03/2023 Negative  Negative mg/dL Final    Ketones, UA 10/03/2023 Negative  Negative Final    Urobilinogen, UA 10/03/2023 0.2 E.U./dL  Normal, 0.2 E.U./dL Final    Bilirubin 10/03/2023 Negative  Negative Final    Blood, UA 10/03/2023 Small (A)  Negative Final    Lot Number 10/03/2023 [id number]   Final    Expiration Date 10/03/2023 9-   Final    RBC, UA 10/03/2023 None Seen  None Seen /HPF Final    WBC, UA 10/03/2023 None Seen  None Seen /HPF Final    Bacteria, UA 10/03/2023 None Seen  None Seen /HPF Final    Crystals, UA 10/03/2023 Small/1+  /HPF Final    Epithelial Cells (non renal) 10/03/2023 0   Final    Renal Epithel, UA 10/03/2023 0   Final    Urine Culture 10/03/2023 No growth   Final       ASSESSMENT/ PLAN:    Diagnoses and all orders for this  visit:    1. Essential hypertension (Primary)    Other orders  -     hydrALAZINE (APRESOLINE) 50 MG tablet; Take 1 tablet by mouth 3 (Three) Times a Day.  Dispense: 270 tablet; Refill: 1               Orders Placed Today:     New Medications Ordered This Visit   Medications    hydrALAZINE (APRESOLINE) 50 MG tablet     Sig: Take 1 tablet by mouth 3 (Three) Times a Day.     Dispense:  270 tablet     Refill:  1        Management Plan:     An After Visit Summary was printed and given to the patient at discharge.    Follow-up: Return in about 4 weeks (around 1/23/2024) for Recheck.    Sameer Garsia DO 12/26/2023 11:55 EST  This note was electronically signed.

## 2024-01-02 ENCOUNTER — PROCEDURE VISIT (OUTPATIENT)
Dept: UROLOGY | Facility: CLINIC | Age: 89
End: 2024-01-02
Payer: MEDICARE

## 2024-01-02 DIAGNOSIS — R35.0 URINARY FREQUENCY: ICD-10-CM

## 2024-01-02 DIAGNOSIS — R39.15 URINARY URGENCY: ICD-10-CM

## 2024-01-02 DIAGNOSIS — N39.41 URGE INCONTINENCE: Primary | ICD-10-CM

## 2024-01-02 LAB
BILIRUB BLD-MCNC: NEGATIVE MG/DL
CLARITY, POC: CLEAR
COLOR UR: YELLOW
EXPIRATION DATE: NORMAL
GLUCOSE UR STRIP-MCNC: NEGATIVE MG/DL
KETONES UR QL: NEGATIVE
LEUKOCYTE EST, POC: NEGATIVE
Lab: NORMAL
NITRITE UR-MCNC: NEGATIVE MG/ML
PH UR: 5.5 [PH] (ref 5–8)
PROT UR STRIP-MCNC: NEGATIVE MG/DL
RBC # UR STRIP: NEGATIVE /UL
SP GR UR: 1.03 (ref 1–1.03)
UROBILINOGEN UR QL: NORMAL

## 2024-01-02 NOTE — PROGRESS NOTES
Preop diagnosis.  Urinary urgency    Urinary frequency    Urgency incontinence.    Conservative treatment failed    Postop diagnosis.  Same    Clinical note.  Patient continues to have urinary frequency urgency and urgency incontinence especially at night.  She was tried on Ditropan XL, Gemtesa and DDAVP without any improvement.  Will go and going to inject 100 units of Botox in the urinary bladder today.  Surgeon.  Dr. Pagan    Assistant.  Ms. Catalina Freeman  Outpatient notes.  Patient was placed lithotomy position and thorough scrubbing her lower abdomen external genitalia was performed.   44 mL of 2% chilled Xylocaine was injected in the urinary bladder.  We waited for 20 minutes and the patient was placed in the lithotomy position again and her external  genitalia was prepped and draped in the usual fashion.  18 Olympus flexible cystoscope was inserted through the urethra and the urinary bladder and cystoscopy was done.  Both ureteral orifices are normal.  There is no bladder tumor present.    Botox needle was inserted in the sideport and then advanced through the mucosa in the urinary bladder we injected 100 units of Botox in the submucosa of urinary bladder.  There was no bleeding after the injection and she tolerated the procedure very well.  Patient had minimal pain during the injection.  She tolerated the procedure well.    I am going to see her in about 2 weeks in the office

## 2024-01-17 ENCOUNTER — TELEPHONE (OUTPATIENT)
Dept: UROLOGY | Facility: CLINIC | Age: 89
End: 2024-01-17

## 2024-02-02 ENCOUNTER — OFFICE VISIT (OUTPATIENT)
Dept: FAMILY MEDICINE CLINIC | Facility: CLINIC | Age: 89
End: 2024-02-02
Payer: MEDICARE

## 2024-02-02 ENCOUNTER — OFFICE VISIT (OUTPATIENT)
Dept: UROLOGY | Facility: CLINIC | Age: 89
End: 2024-02-02
Payer: MEDICARE

## 2024-02-02 VITALS
HEIGHT: 62 IN | SYSTOLIC BLOOD PRESSURE: 131 MMHG | HEART RATE: 70 BPM | DIASTOLIC BLOOD PRESSURE: 76 MMHG | OXYGEN SATURATION: 94 % | BODY MASS INDEX: 30 KG/M2 | TEMPERATURE: 97.5 F | WEIGHT: 163 LBS

## 2024-02-02 VITALS
HEART RATE: 61 BPM | HEIGHT: 62 IN | BODY MASS INDEX: 30.62 KG/M2 | SYSTOLIC BLOOD PRESSURE: 115 MMHG | DIASTOLIC BLOOD PRESSURE: 60 MMHG | WEIGHT: 166.4 LBS

## 2024-02-02 DIAGNOSIS — Z78.0 POSTMENOPAUSE: ICD-10-CM

## 2024-02-02 DIAGNOSIS — R35.1 NOCTURIA: ICD-10-CM

## 2024-02-02 DIAGNOSIS — E78.5 HYPERLIPIDEMIA, UNSPECIFIED HYPERLIPIDEMIA TYPE: Primary | ICD-10-CM

## 2024-02-02 DIAGNOSIS — R73.03 PRE-DIABETES: ICD-10-CM

## 2024-02-02 DIAGNOSIS — N39.8 VOIDING DYSFUNCTION: Primary | ICD-10-CM

## 2024-02-02 DIAGNOSIS — I10 ESSENTIAL HYPERTENSION: ICD-10-CM

## 2024-02-02 LAB
ALBUMIN SERPL-MCNC: 4.1 G/DL (ref 3.5–5.2)
ALBUMIN UR-MCNC: 3.3 MG/DL
ALBUMIN/GLOB SERPL: 1.5 G/DL
ALP SERPL-CCNC: 90 U/L (ref 39–117)
ALT SERPL W P-5'-P-CCNC: 13 U/L (ref 1–33)
ANION GAP SERPL CALCULATED.3IONS-SCNC: 11.4 MMOL/L (ref 5–15)
AST SERPL-CCNC: 24 U/L (ref 1–32)
BILIRUB SERPL-MCNC: 0.5 MG/DL (ref 0–1.2)
BUN SERPL-MCNC: 23 MG/DL (ref 8–23)
BUN/CREAT SERPL: 23.7 (ref 7–25)
CALCIUM SPEC-SCNC: 9.6 MG/DL (ref 8.6–10.5)
CHLORIDE SERPL-SCNC: 102 MMOL/L (ref 98–107)
CHOLEST SERPL-MCNC: 149 MG/DL (ref 0–200)
CO2 SERPL-SCNC: 27.6 MMOL/L (ref 22–29)
CREAT SERPL-MCNC: 0.97 MG/DL (ref 0.57–1)
CREAT UR-MCNC: 224.2 MG/DL
EGFRCR SERPLBLD CKD-EPI 2021: 57 ML/MIN/1.73
GLOBULIN UR ELPH-MCNC: 2.8 GM/DL
GLUCOSE SERPL-MCNC: 94 MG/DL (ref 65–99)
HBA1C MFR BLD: 5.5 % (ref 4.8–5.6)
HDLC SERPL-MCNC: 58 MG/DL (ref 40–60)
LDLC SERPL CALC-MCNC: 69 MG/DL (ref 0–100)
LDLC/HDLC SERPL: 1.13 {RATIO}
MICROALBUMIN/CREAT UR: 14.7 MG/G (ref 0–29)
POTASSIUM SERPL-SCNC: 3.8 MMOL/L (ref 3.5–5.2)
PROT SERPL-MCNC: 6.9 G/DL (ref 6–8.5)
SODIUM SERPL-SCNC: 141 MMOL/L (ref 136–145)
TRIGL SERPL-MCNC: 126 MG/DL (ref 0–150)
VLDLC SERPL-MCNC: 22 MG/DL (ref 5–40)

## 2024-02-02 PROCEDURE — 99214 OFFICE O/P EST MOD 30 MIN: CPT | Performed by: FAMILY MEDICINE

## 2024-02-02 PROCEDURE — 83036 HEMOGLOBIN GLYCOSYLATED A1C: CPT | Performed by: FAMILY MEDICINE

## 2024-02-02 PROCEDURE — 82570 ASSAY OF URINE CREATININE: CPT | Performed by: FAMILY MEDICINE

## 2024-02-02 PROCEDURE — 80053 COMPREHEN METABOLIC PANEL: CPT | Performed by: FAMILY MEDICINE

## 2024-02-02 PROCEDURE — 80061 LIPID PANEL: CPT | Performed by: FAMILY MEDICINE

## 2024-02-02 PROCEDURE — 82043 UR ALBUMIN QUANTITATIVE: CPT | Performed by: FAMILY MEDICINE

## 2024-02-02 RX ORDER — TAMSULOSIN HYDROCHLORIDE 0.4 MG/1
1 CAPSULE ORAL DAILY
Qty: 90 CAPSULE | Refills: 3 | Status: SHIPPED | OUTPATIENT
Start: 2024-02-02 | End: 2025-01-27

## 2024-02-02 NOTE — PROGRESS NOTES
"Chief Complaint  Overactive urinary bladder    Status post Botox injection    Difficulty with urination    Subjective no acute distress        Kristine Rahman presents to [facility] UROLOG[facility]  History of Present Illness    88-year-old white female had Botox injection which really has not helped her.  Her main problem is at nighttime and she has difficulty with emptying her urinary bladder.  She does not have much problem in the daytime.    Objective no acute distress  Vital Signs:   /60 (BP Location: Left arm, Patient Position: Sitting, Cuff Size: Adult)   Pulse 61   Ht 157.5 cm (62.01\")   Wt 75.5 kg (166 lb 6.4 oz)   BMI 30.43 kg/m²     Allergies   Allergen Reactions    Latex Unknown - Low Severity      Past medical history:  has a past medical history of Anxiety, Arthritis, Asthma, Basal cell carcinoma, Crohn's disease, Depression, Diverticulitis, Gastric ulcer, and Hammer toe.   Past surgical history:  has a past surgical history that includes Colonoscopy; Hemorrhoid surgery; and Cholecystectomy.  Personal history: family history includes Colon cancer in her cousin; Diabetes in her daughter; Hypertension in her daughter and son.  Social history:  reports that she has never smoked. She has never been exposed to tobacco smoke. She has never used smokeless tobacco. She reports that she does not currently use alcohol. She reports that she does not use drugs.    Review of Systems    No change from before    Physical Exam  Constitutional:       General: She is not in acute distress.     Appearance: Normal appearance. She is obese. She is not ill-appearing or toxic-appearing.   HENT:      Head: Normocephalic and atraumatic.      Ears:      Comments: No hearing loss  Pulmonary:      Effort: Pulmonary effort is normal. No respiratory distress.   Skin:     General: Skin is warm.      Coloration: Skin is not jaundiced.   Neurological:      General: No focal deficit present.      Mental Status: She " is alert and oriented to person, place, and time.      Motor: No weakness.      Gait: Gait normal.   Psychiatric:         Mood and Affect: Mood normal.         Behavior: Behavior normal.         Thought Content: Thought content normal.         Judgment: Judgment normal.        Result Review :                 Assessment and Plan    Diagnoses and all orders for this visit:    1. Voiding dysfunction (Primary)  -     tamsulosin (FLOMAX) 0.4 MG capsule 24 hr capsule; Take 1 capsule by mouth Daily for 360 days.  Dispense: 90 capsule; Refill: 3    2. Nocturia  -     POC Urinalysis Dipstick, Automated    Patient main problem at this time is difficulty with emptying her bladder at nighttime.  Her daughter has used tamsulosin and she wants to try that and see if that will help her emptying her bladder at night.  Will try that for a month and see her again in a month     Brief Urine Lab Results  (Last result in the past 365 days)        Color   Clarity   Blood   Leuk Est   Nitrite   Protein   CREAT   Urine HCG        01/02/24 1334 Yellow   Clear   Negative   Negative   Negative   Negative                    Follow Up   No follow-ups on file.  Patient was given instructions and counseling regarding her condition or for health maintenance advice. Please see specific information pulled into the AVS if appropriate.     Lemuel Pagan MD

## 2024-02-02 NOTE — ASSESSMENT & PLAN NOTE
Her blood pressure here is much improved from our last visit.  Will continue her current meds and update her labs.

## 2024-02-02 NOTE — PROGRESS NOTES
Chief Complaint   Patient presents with    Follow-up     4 week     Hypertension        Subjective     Kristine Rahman  has a past medical history of Anxiety, Arthritis, Asthma, Basal cell carcinoma, Crohn's disease, Depression, Diverticulitis, Gastric ulcer, and Hammer toe.    Hypertension-she has been checking her blood pressures at home.  These range from 131-163/50-60.  Her blood pressure here today is very good at 131/76.    Hyperlipidemia-she takes her pravastatin on a nightly basis.    Prediabetes-she does not check her blood sugars outside the office.  She feels she could do better about moderating her carbohydrates.  She does not have any excessive thirst blurred vision or any excessive daytime urination but does have excessive nighttime urination.        PHQ-2 Depression Screening  Little interest or pleasure in doing things?     Feeling down, depressed, or hopeless?     PHQ-2 Total Score     PHQ-9 Depression Screening  Little interest or pleasure in doing things?     Feeling down, depressed, or hopeless?     Trouble falling or staying asleep, or sleeping too much?     Feeling tired or having little energy?     Poor appetite or overeating?     Feeling bad about yourself - or that you are a failure or have let yourself or your family down?     Trouble concentrating on things, such as reading the newspaper or watching television?     Moving or speaking so slowly that other people could have noticed? Or the opposite - being so fidgety or restless that you have been moving around a lot more than usual?     Thoughts that you would be better off dead, or of hurting yourself in some way?     PHQ-9 Total Score     If you checked off any problems, how difficult have these problems made it for you to do your work, take care of things at home, or get along with other people?       Allergies   Allergen Reactions    Latex Unknown - Low Severity       Prior to Admission medications    Medication Sig Start Date End Date  Taking? Authorizing Provider   amLODIPine (NORVASC) 10 MG tablet Take 1 tablet by mouth Daily. 7/6/23  Yes Sameer Garsia DO   aspirin 81 MG EC tablet Aspir-81 81 mg oral tablet,delayed release (DR/EC) take 1 tablet (81 mg) by oral route once daily   Active   Yes Vane Greenberg MD   benazepril (LOTENSIN) 40 MG tablet Take 1 tablet by mouth Daily. 7/6/23  Yes Sameer Garsia DO   Calcium Carbonate-Vit D-Min (CALCIUM 1200 PO) Take 1,200 mg by mouth Daily.   Yes Vane Greenberg MD   clopidogrel (PLAVIX) 75 MG tablet Take 1 tablet by mouth Daily. 3/20/23  Yes Sameer Garsia DO   desmopressin (DDAVP) 0.2 MG tablet Take 1 tablet by mouth Every Night. 8/16/23  Yes Lemuel Pagan MD   dicyclomine (BENTYL) 10 MG capsule Take 1 capsule by mouth 3 (Three) Times a Day As Needed (abdominal cramping). 5/20/23  Yes Leon Ghosh MD   estradiol (ESTRACE) 0.1 MG/GM vaginal cream Insert 2 g into the vagina Daily. 1/25/23  Yes Lemuel Pagan MD   fluticasone (FLONASE) 50 MCG/ACT nasal spray 2 sprays into the nostril(s) as directed by provider Daily. 5/25/23  Yes Fabby Hooper APRN   hydrALAZINE (APRESOLINE) 50 MG tablet Take 1 tablet by mouth 3 (Three) Times a Day. 12/26/23  Yes Sameer Garsia DO   metoprolol tartrate (LOPRESSOR) 50 MG tablet TAKE 1 TABLET TWICE A DAY  (NEW DOSAGE) 7/28/23  Yes Sameer Garsia DO   mupirocin (BACTROBAN) 2 % ointment Apply 1 Application topically to the appropriate area as directed 2 (Two) Times a Day. 9/28/23  Yes Vane Greenberg MD   Probiotic Product (PROBIOTIC-10 PO) Take  by mouth.   Yes Vane Greenberg MD   chlorthalidone (HYGROTON) 25 MG tablet Take 1 tablet by mouth Daily for 90 days. 5/8/23 10/3/23  Sameer Garsia DO   pravastatin (PRAVACHOL) 20 MG tablet Take 1 tablet by mouth Every Night for 90 days. 7/6/23 10/4/23  Sameer Garsia DO   sertraline (ZOLOFT) 100 MG tablet Take 1  tablet by mouth Daily for 90 days. 7/6/23 10/4/23  Sameer Garsia DO        Patient Active Problem List   Diagnosis    Anxiety    Arthritis    Asthma    Colon polyps    Crohn's disease    Depression    Pre-diabetes    Diverticulitis    Essential hypertension    Gastric ulcer    Hyperlipemia    Seasonal allergic rhinitis    Shortness of breath    Ulcerative lesion    Urinary incontinence without sensory awareness    COVID-19 virus detected    Lesion of skin of nose    Thyroid nodule    Medicare annual wellness visit, subsequent    Need for tetanus, diphtheria, and acellular pertussis (Tdap) vaccine    Need for shingles vaccine        Past Surgical History:   Procedure Laterality Date    CHOLECYSTECTOMY      COLONOSCOPY      HEMORRHOIDECTOMY         Social History     Socioeconomic History    Marital status:     Number of children: 3   Tobacco Use    Smoking status: Never     Passive exposure: Never    Smokeless tobacco: Never   Vaping Use    Vaping Use: Never used   Substance and Sexual Activity    Alcohol use: Not Currently    Drug use: Never    Sexual activity: Not Currently     Birth control/protection: Post-menopausal       Family History   Problem Relation Age of Onset    Colon cancer Cousin     Diabetes Daughter     Hypertension Daughter     Hypertension Son        Family history, surgical history, past medical history, Allergies and meds reviewed with patient today and updated in [facility] EMR.     ROS:  Review of Systems   Constitutional:  Positive for fatigue.   HENT:  Negative for congestion, postnasal drip and rhinorrhea.    Eyes:  Negative for blurred vision and visual disturbance.   Respiratory:  Negative for cough, chest tightness, shortness of breath and wheezing.    Cardiovascular:  Negative for chest pain and palpitations.   Endocrine: Negative for polydipsia and polyuria.   Allergic/Immunologic: Negative for environmental allergies.   Neurological:  Negative for headache.  "  Psychiatric/Behavioral:  Negative for depressed mood. The patient is not nervous/anxious.        OBJECTIVE:  Vitals:    02/02/24 1143   BP: 131/76   BP Location: Left arm   Patient Position: Sitting   Pulse: 70   Temp: 97.5 °F (36.4 °C)   SpO2: 94%   Weight: 73.9 kg (163 lb)   Height: 157.5 cm (62\")     No results found.   Body mass index is 29.81 kg/m².  No LMP recorded. Patient is postmenopausal.    Physical Exam  Vitals and nursing note reviewed.   Constitutional:       General: She is not in acute distress.     Appearance: Normal appearance. She is normal weight.   HENT:      Head: Normocephalic.      Right Ear: Tympanic membrane, ear canal and external ear normal.      Left Ear: Tympanic membrane, ear canal and external ear normal.      Nose: Nose normal.      Mouth/Throat:      Mouth: Mucous membranes are moist.      Pharynx: Oropharynx is clear.   Eyes:      General: No scleral icterus.     Conjunctiva/sclera: Conjunctivae normal.      Pupils: Pupils are equal, round, and reactive to light.   Cardiovascular:      Rate and Rhythm: Normal rate and regular rhythm.      Pulses: Normal pulses.      Heart sounds: Normal heart sounds. No murmur heard.  Pulmonary:      Effort: Pulmonary effort is normal.      Breath sounds: Normal breath sounds. No wheezing, rhonchi or rales.   Musculoskeletal:      Cervical back: Neck supple. No rigidity or tenderness.   Lymphadenopathy:      Cervical: No cervical adenopathy.   Skin:     General: Skin is warm and dry.      Coloration: Skin is not jaundiced.      Findings: No rash.   Neurological:      General: No focal deficit present.      Mental Status: She is alert and oriented to person, place, and time.   Psychiatric:         Mood and Affect: Mood normal.         Thought Content: Thought content normal.         Judgment: Judgment normal.         Procedures    No visits with results within 30 Day(s) from this visit.   Latest known visit with results is:   Procedure visit on " 01/02/2024   Component Date Value Ref Range Status    Color 01/02/2024 Yellow  Yellow, Straw, Dark Yellow, Tori Final    Clarity, UA 01/02/2024 Clear  Clear Final    Specific Gravity  01/02/2024 1.030  1.005 - 1.030 Final    pH, Urine 01/02/2024 5.5  5.0 - 8.0 Final    Leukocytes 01/02/2024 Negative  Negative Final    Nitrite, UA 01/02/2024 Negative  Negative Final    Protein, POC 01/02/2024 Negative  Negative mg/dL Final    Glucose, UA 01/02/2024 Negative  Negative mg/dL Final    Ketones, UA 01/02/2024 Negative  Negative Final    Urobilinogen, UA 01/02/2024 0.2 E.U./dL  Normal, 0.2 E.U./dL Final    Bilirubin 01/02/2024 Negative  Negative Final    Blood, UA 01/02/2024 Negative  Negative Final    Lot Number 01/02/2024 [id number]   Final    Expiration Date 01/02/2024 10/2,024   Final       ASSESSMENT/ PLAN:    Diagnoses and all orders for this visit:    1. Hyperlipidemia, unspecified hyperlipidemia type (Primary)  Assessment & Plan:  Will get an update on her lipid profile with her labs here today.    Orders:  -     Comprehensive Metabolic Panel  -     Lipid Panel    2. Postmenopause    3. Essential hypertension  Assessment & Plan:  Her blood pressure here is much improved from our last visit.  Will continue her current meds and update her labs.    Orders:  -     Comprehensive Metabolic Panel  -     Lipid Panel  -     Microalbumin / Creatinine Urine Ratio - Urine, Clean Catch    4. Pre-diabetes  Assessment & Plan:  Will get an update on her A1c.  At this point in her life she is unlikely to become diabetic.    Orders:  -     Hemoglobin A1c  -     Microalbumin / Creatinine Urine Ratio - Urine, Clean Catch               Orders Placed Today:     No orders of the defined types were placed in this encounter.       Management Plan:     An After Visit Summary was printed and given to the patient at discharge.    Follow-up: Return in about 4 months (around 6/2/2024) for Recheck.    Sameer Garsia,  2/2/2024 12:02  EST  This note was electronically signed.

## 2024-02-05 ENCOUNTER — TELEPHONE (OUTPATIENT)
Dept: FAMILY MEDICINE CLINIC | Facility: CLINIC | Age: 89
End: 2024-02-05
Payer: MEDICARE

## 2024-02-05 RX ORDER — HYDRALAZINE HYDROCHLORIDE 50 MG/1
50 TABLET, FILM COATED ORAL 3 TIMES DAILY
Qty: 270 TABLET | Refills: 1 | Status: SHIPPED | OUTPATIENT
Start: 2024-02-05

## 2024-02-05 RX ORDER — SERTRALINE HYDROCHLORIDE 100 MG/1
100 TABLET, FILM COATED ORAL DAILY
Qty: 90 TABLET | Refills: 3 | Status: SHIPPED | OUTPATIENT
Start: 2024-02-05

## 2024-02-05 RX ORDER — PRAVASTATIN SODIUM 20 MG
20 TABLET ORAL NIGHTLY
Qty: 90 TABLET | Refills: 3 | Status: SHIPPED | OUTPATIENT
Start: 2024-02-05

## 2024-02-05 RX ORDER — BENAZEPRIL HYDROCHLORIDE 40 MG/1
40 TABLET ORAL DAILY
Qty: 90 TABLET | Refills: 3 | Status: SHIPPED | OUTPATIENT
Start: 2024-02-05

## 2024-02-05 RX ORDER — AMLODIPINE BESYLATE 10 MG/1
10 TABLET ORAL DAILY
Qty: 90 TABLET | Refills: 3 | Status: SHIPPED | OUTPATIENT
Start: 2024-02-05

## 2024-02-05 RX ORDER — CLOPIDOGREL BISULFATE 75 MG/1
75 TABLET ORAL DAILY
Qty: 90 TABLET | Refills: 3 | Status: SHIPPED | OUTPATIENT
Start: 2024-02-05

## 2024-02-05 RX ORDER — CHLORTHALIDONE 25 MG/1
25 TABLET ORAL DAILY
Qty: 90 TABLET | Refills: 3 | Status: SHIPPED | OUTPATIENT
Start: 2024-02-05

## 2024-02-05 NOTE — TELEPHONE ENCOUNTER
Caller: TRISTON PARTIDA    Relationship: Emergency Contact    Best call back number: [phone]    Requested Prescriptions:   Requested Prescriptions     Pending Prescriptions Disp Refills    amLODIPine (NORVASC) 10 MG tablet 90 tablet 3     Sig: Take 1 tablet by mouth Daily.    benazepril (LOTENSIN) 40 MG tablet 90 tablet 3     Sig: Take 1 tablet by mouth Daily.    chlorthalidone (HYGROTON) 25 MG tablet 90 tablet 3     Sig: Take 1 tablet by mouth Daily for 90 days.    clopidogrel (PLAVIX) 75 MG tablet 90 tablet 3     Sig: Take 1 tablet by mouth Daily.    hydrALAZINE (APRESOLINE) 50 MG tablet 270 tablet 1     Sig: Take 1 tablet by mouth 3 (Three) Times a Day.    pravastatin (PRAVACHOL) 20 MG tablet 90 tablet 3     Sig: Take 1 tablet by mouth Every Night for 90 days.    sertraline (ZOLOFT) 100 MG tablet 90 tablet 3     Sig: Take 1 tablet by mouth Daily for 90 days.        Pharmacy where request should be sent: [facility], [place] [id number] [place], [place] [phone] [place] [address]     Last office visit with prescribing clinician: 2/2/2024   Last telemedicine visit with prescribing clinician: Visit date not found   Next office visit with prescribing clinician: 2/6/2024     Additional details provided by patient: PATIENT IS ALMOST OUT OF THESE MEDICATIONS.     Does the patient have less than a 3 day supply:  [x] Yes  [] No    Would you like a call back once the refill request has been completed: [] Yes [x] No    If the office needs to give you a call back, can they leave a voicemail: [] Yes [x] No    Vivek Rebolledo Rep   02/05/24 10:46 EST

## 2024-02-05 NOTE — TELEPHONE ENCOUNTER
Caller: TRISTON PARTIDA    Relationship: Emergency Contact    Best call back number: [phone]    What was the call regarding: PATIENT DAUGHTER WANTED TO LET PROVIDER KNOW THAT PATIENT HAD...    COVID BOOSTER AND FLU VACCINE ON 10/27/2023  1ST SHINGLES- 05/21/2023, 2ND SHINGLES- 08/28/2023  TETNIS- 05/21/2023

## 2024-02-05 NOTE — TELEPHONE ENCOUNTER
Caller: TRISTON PARTIDA    Relationship: Emergency Contact    Best call back number: [phone]    Caller requesting test results: DAUGHTER    What test was performed: LABS    Additional notes: PATIENT'S DAUGHTER IS RETURNING CALL REGARDING LAB RESULTS FOR PATIENT.

## 2024-03-04 ENCOUNTER — OFFICE VISIT (OUTPATIENT)
Dept: UROLOGY | Facility: CLINIC | Age: 89
End: 2024-03-04
Payer: MEDICARE

## 2024-03-04 VITALS
HEART RATE: 61 BPM | BODY MASS INDEX: 30.4 KG/M2 | WEIGHT: 165.2 LBS | SYSTOLIC BLOOD PRESSURE: 110 MMHG | DIASTOLIC BLOOD PRESSURE: 42 MMHG | HEIGHT: 62 IN

## 2024-03-04 DIAGNOSIS — N39.8 VOIDING DYSFUNCTION: Primary | ICD-10-CM

## 2024-03-04 DIAGNOSIS — N39.41 URGE INCONTINENCE: ICD-10-CM

## 2024-03-04 DIAGNOSIS — R10.9 BILATERAL FLANK PAIN: ICD-10-CM

## 2024-03-04 DIAGNOSIS — R35.0 URINARY FREQUENCY: ICD-10-CM

## 2024-03-04 NOTE — PROGRESS NOTES
"Chief Complaint  Overactive urinary bladder (AT NIGHT/FOLLOW/UP) and Difficulty Urinating (1 MONTH FOLLOW/UP)    Patient was started on tamsulosin at her request.    Subjective patient has low blood pressure        Kristine Rahman presents to [facility]  History of Present Illness    88-year-old white female continues to have nocturia 2-3 times and frequency in the daytime about 5-6 times.  Sometimes she has dysuria especially at nighttime when she is wet.  Patient cannot get up to urinate at nighttime and leaks.    Patient has been having pain in her right and left flank area for last 2 years.  This is not a constant pain and just comes and goes.  The pain is in both flanks sometimes and at its worst it is about 4/10.  She has no history of kidney stones.  Patient gets chills but he never had a high temperature.  Patient could not give me a urine specimen today because she had bowel movement in [facility] and urinated at the same time.    I do not have a clear-cut history on her because sometimes she says that she gets wet at night because she cannot get up and sleep deeply.    Patient has been dizzy and her blood pressure was low today.  I requested her to stop taking Flomax because that will reduce her blood pressure.    Objective no acute distress  Vital Signs:   /42 (BP Location: Left arm, Patient Position: Sitting, Cuff Size: Adult)   Pulse 61   Ht 157.5 cm (62.01\")   Wt 74.9 kg (165 lb 3.2 oz)   BMI 30.21 kg/m²     Allergies   Allergen Reactions    Latex Unknown - Low Severity      Past medical history:  has a past medical history of Anxiety, Arthritis, Asthma, Basal cell carcinoma, Crohn's disease, Depression, Diverticulitis, Gastric ulcer, and Hammer toe.   Past surgical history:  has a past surgical history that includes Colonoscopy; Hemorrhoid surgery; and Cholecystectomy.  Personal history: family history includes Colon cancer in her cousin; Diabetes in her daughter; " Hypertension in her daughter and son.  Social history:  reports that she has never smoked. She has never been exposed to tobacco smoke. She has never used smokeless tobacco. She reports that she does not currently use alcohol. She reports that she does not use drugs.    Review of Systems    Please see past medical and surgical history rest of the system is negative.    Physical Exam  Constitutional:       General: She is not in acute distress.     Appearance: Normal appearance. She is obese. She is not ill-appearing or toxic-appearing.   HENT:      Head: Normocephalic and atraumatic.      Ears:      Comments: No loss of hearing  Abdominal:      General: There is no distension.      Palpations: There is no mass.      Tenderness: There is no abdominal tenderness. There is right CVA tenderness and left CVA tenderness.   Skin:     General: Skin is warm.      Coloration: Skin is not jaundiced.   Neurological:      General: No focal deficit present.      Mental Status: She is alert and oriented to person, place, and time.      Motor: No weakness.      Gait: Gait normal.   Psychiatric:         Mood and Affect: Mood normal.         Behavior: Behavior normal.         Thought Content: Thought content normal.         Judgment: Judgment normal.        Result Review :                 Assessment and Plan    Diagnoses and all orders for this visit:    1. Voiding dysfunction (Primary)  -     POC Urinalysis Dipstick, Automated  -     US Renal Bilateral; Future    2. Urge incontinence  -     POC Urinalysis Dipstick, Automated    3. Urinary frequency  -     POC Urinalysis Dipstick, Automated    4. Bilateral flank pain  -     US Renal Bilateral; Future    Patient cannot explain to me exactly what is the problem.  And I really do not know what is a problem but she does have flank pain and has nocturia.  I am going to do bladder diary for a week and do ultrasound of her kidneys to make sure there is no obstructive uropathy present.  Will  see her in 1 week's time.     Brief Urine Lab Results  (Last result in the past 365 days)        Color   Clarity   Blood   Leuk Est   Nitrite   Protein   CREAT   Urine HCG        02/02/24 1455             224.2                  Follow Up   Return in about 1 week (around 3/11/2024).  Patient was given instructions and counseling regarding her condition or for health maintenance advice. Please see specific information pulled into the AVS if appropriate.     Lemuel Pagan MD

## 2024-03-12 ENCOUNTER — OFFICE VISIT (OUTPATIENT)
Dept: UROLOGY | Facility: CLINIC | Age: 89
End: 2024-03-12
Payer: MEDICARE

## 2024-03-12 VITALS
HEART RATE: 53 BPM | HEIGHT: 62 IN | DIASTOLIC BLOOD PRESSURE: 55 MMHG | BODY MASS INDEX: 29.85 KG/M2 | WEIGHT: 162.2 LBS | SYSTOLIC BLOOD PRESSURE: 133 MMHG

## 2024-03-12 DIAGNOSIS — N39.8 VOIDING DYSFUNCTION: ICD-10-CM

## 2024-03-12 DIAGNOSIS — N32.81 OVERACTIVE BLADDER: ICD-10-CM

## 2024-03-12 DIAGNOSIS — R32 ENURESIS: Primary | ICD-10-CM

## 2024-03-12 RX ORDER — IMIPRAMINE HCL 25 MG
25 TABLET ORAL NIGHTLY
Qty: 30 TABLET | Refills: 2 | Status: SHIPPED | OUTPATIENT
Start: 2024-03-12

## 2024-03-12 NOTE — PROGRESS NOTES
"Chief Complaint  Voiding dysfunction (1 WEEK FOLLOW/UP TO DISCUSS BLADDER DIARY)    Enuresis    Subjective no acute distress        Kristine Rahman presents to [facility]  History of Present Illness    88-year-old white female has been urinating okay in the daytime but at nighttime she wears pad and it soaks.  Patient does not have the feeling of fullness of urinary bladder and does not get up.  She has no dysuria or gross hematuria    Objective no acute distress  Vital Signs:   /55 (BP Location: Left arm, Patient Position: Sitting, Cuff Size: Adult)   Pulse 53   Ht 157.5 cm (62.01\")   Wt 73.6 kg (162 lb 3.2 oz)   BMI 29.66 kg/m²     Allergies   Allergen Reactions    Latex Unknown - Low Severity      Past medical history:  has a past medical history of Anxiety, Arthritis, Asthma, Basal cell carcinoma, Crohn's disease, Depression, Diverticulitis, Gastric ulcer, and Hammer toe.   Past surgical history:  has a past surgical history that includes Colonoscopy; Hemorrhoid surgery; and Cholecystectomy.  Personal history: family history includes Colon cancer in her cousin; Diabetes in her daughter; Hypertension in her daughter and son.  Social history:  reports that she has never smoked. She has never been exposed to tobacco smoke. She has never used smokeless tobacco. She reports that she does not currently use alcohol. She reports that she does not use drugs.    Review of Systems    Please see past medical and surgical history rest of the system is negative    Physical Exam  Constitutional:       General: She is not in acute distress.     Appearance: Normal appearance. She is normal weight. She is not ill-appearing or toxic-appearing.   Abdominal:      Palpations: Abdomen is soft. There is no mass.      Tenderness: There is no abdominal tenderness. There is no right CVA tenderness or left CVA tenderness.   Musculoskeletal:         General: Normal range of motion.   Skin:     General: " Skin is warm.      Coloration: Skin is not jaundiced.   Neurological:      General: No focal deficit present.      Mental Status: She is alert and oriented to person, place, and time.      Motor: No weakness.      Gait: Gait normal.   Psychiatric:         Mood and Affect: Mood normal.         Behavior: Behavior normal.         Thought Content: Thought content normal.         Judgment: Judgment normal.        Result Review :                 Assessment and Plan    Diagnoses and all orders for this visit:    1. Voiding dysfunction (Primary)  -     POC Urinalysis Dipstick, Automated    2. Enuresis    3. Overactive bladder    Start the patient on Tofranil 25 mg at bedtime and see if that will help her.  I have to stop her Zoloft for a month and I am going to recheck her in a month     Brief Urine Lab Results  (Last result in the past 365 days)        Color   Clarity   Blood   Leuk Est   Nitrite   Protein   CREAT   Urine HCG        03/12/24 1030 Yellow   Clear   Negative   Negative   Negative   Negative                    Follow Up   No follow-ups on file.  Patient was given instructions and counseling regarding her condition or for health maintenance advice. Please see specific information pulled into the AVS if appropriate.     Lemuel Pagan MD

## 2024-03-26 ENCOUNTER — HOSPITAL ENCOUNTER (OUTPATIENT)
Dept: ULTRASOUND IMAGING | Facility: HOSPITAL | Age: 89
Discharge: HOME | End: 2024-03-26
Admitting: UROLOGY
Payer: MEDICARE

## 2024-03-26 ENCOUNTER — TELEPHONE (OUTPATIENT)
Dept: UROLOGY | Facility: CLINIC | Age: 89
End: 2024-03-26
Payer: MEDICARE

## 2024-03-26 DIAGNOSIS — R10.9 BILATERAL FLANK PAIN: ICD-10-CM

## 2024-03-26 DIAGNOSIS — N39.8 VOIDING DYSFUNCTION: ICD-10-CM

## 2024-03-26 PROCEDURE — 76775 US EXAM ABDO BACK WALL LIM: CPT

## 2024-03-26 NOTE — TELEPHONE ENCOUNTER
Spoke with [facility], she said [facility] called and canceled Imipramine medication, and that it needs a prior auth,  We don't have any documentation scanned in or received any faxes, office will call patient when we figure issue out

## 2024-03-26 NOTE — TELEPHONE ENCOUNTER
CALLED PHARMACY SPOKE WITH [facility] TECH SHE WILL BE SENDING A FAX TO THE OFFICE TO GET A PA APPROVED FOR THIS MEDICATION  PHARMACY STATES THEY HAD THE INCORRECT FAX # FOR THE OFFICE        imipramine (TOFRANIL) 25 MG tablet        Sig: Take 1 tablet by mouth Every Night.        Sent to pharmacy as: Imipramine HCl 25 MG Oral Tablet (TOFRANIL)        Class: Normal        Route: Oral        E-Prescribing Status: Receipt confirmed by pharmacy (3/12/2024 11:29 AM EDT)

## 2024-04-09 ENCOUNTER — OFFICE VISIT (OUTPATIENT)
Dept: UROLOGY | Facility: CLINIC | Age: 89
End: 2024-04-09
Payer: MEDICARE

## 2024-04-09 ENCOUNTER — TELEPHONE (OUTPATIENT)
Dept: UROLOGY | Facility: CLINIC | Age: 89
End: 2024-04-09

## 2024-04-09 VITALS
HEIGHT: 62 IN | SYSTOLIC BLOOD PRESSURE: 136 MMHG | DIASTOLIC BLOOD PRESSURE: 43 MMHG | WEIGHT: 162 LBS | BODY MASS INDEX: 29.81 KG/M2

## 2024-04-09 DIAGNOSIS — N39.41 URGE INCONTINENCE OF URINE: Primary | ICD-10-CM

## 2024-04-09 DIAGNOSIS — N39.8 VOIDING DYSFUNCTION: ICD-10-CM

## 2024-04-09 DIAGNOSIS — R32 ENURESIS: ICD-10-CM

## 2024-04-09 LAB
BACTERIA UR QL AUTO: NORMAL /HPF
BILIRUB BLD-MCNC: NEGATIVE MG/DL
CLARITY, POC: ABNORMAL
COLOR UR: YELLOW
EPI CELLS #/AREA URNS HPF: NORMAL /[HPF]
EXPIRATION DATE: ABNORMAL
GLUCOSE UR STRIP-MCNC: NEGATIVE MG/DL
KETONES UR QL: NEGATIVE
LEUKOCYTE EST, POC: ABNORMAL
Lab: ABNORMAL
NITRITE UR-MCNC: NEGATIVE MG/ML
PH UR: 5.5 [PH] (ref 5–8)
PROT UR STRIP-MCNC: NEGATIVE MG/DL
RBC # UR STRIP: NEGATIVE /UL
RBC # UR STRIP: NORMAL /HPF
RENAL EPITHELIAL, POC: 0
SP GR UR: 1.02 (ref 1–1.03)
UNIDENT CRYS URNS QL MICRO: NORMAL /HPF
UROBILINOGEN UR QL: ABNORMAL
WBC # UR STRIP: NORMAL /HPF

## 2024-04-09 NOTE — TELEPHONE ENCOUNTER
Spoke with Jarod BELLAMY [employer] to get a PA approval for imipramine 25mg   Case # [id number]  Will send a new pa form by fax

## 2024-04-09 NOTE — PROGRESS NOTES
"Chief Complaint  Enuresis Voiding dysfunction (1mo. F/u pt  states no improvement)    Nocturnal urgency incontinence    Subjective no acute distress        Kristine Rahman presents to [facility]  History of Present Illness    88-year-old white female continues to have nocturnal urinary incontinence.  History of enuresis when she was young.  Patient has no incontinence in the daytime but at nighttime she does not go to the toilet to urinate accept sometimes.  I do not know for discontinuation of her bedwetting problem when she was young.  We have tried her on Gemtesa and intravesical Botox without any improvement.  Patient has no dysuria or gross hematuria.    I gave her imipramine 25 mg nightly but the insurance does not approve the medication    Objective no acute distress  Vital Signs:   /43 (BP Location: Left arm, Patient Position: Sitting, Cuff Size: Adult)   Ht 157.5 cm (62.01\")   Wt 73.5 kg (162 lb)   BMI 29.62 kg/m²     Allergies   Allergen Reactions    Latex Unknown - Low Severity      Past medical history:  has a past medical history of Anxiety, Arthritis, Asthma, Basal cell carcinoma, Crohn's disease, Depression, Diverticulitis, Gastric ulcer, and Hammer toe.   Past surgical history:  has a past surgical history that includes Colonoscopy; Hemorrhoid surgery; and Cholecystectomy.  Personal history: family history includes Colon cancer in her cousin; Diabetes in her daughter; Hypertension in her daughter and son.  Social history:  reports that she has never smoked. She has never been exposed to tobacco smoke. She has never used smokeless tobacco. She reports that she does not currently use alcohol. She reports that she does not use drugs.    Review of Systems    Please see past medical and surgical history rest of the system is negative    Physical Exam  Constitutional:       General: She is not in acute distress.     Appearance: Normal appearance. She is obese. She is not " ill-appearing or toxic-appearing.   HENT:      Head: Normocephalic and atraumatic.      Ears:      Comments: No loss of hearing  Musculoskeletal:         General: Normal range of motion.   Skin:     General: Skin is warm.      Coloration: Skin is not jaundiced.   Neurological:      General: No focal deficit present.      Mental Status: She is alert and oriented to person, place, and time.      Motor: No weakness.      Gait: Gait normal.   Psychiatric:         Mood and Affect: Mood normal.         Behavior: Behavior normal.         Thought Content: Thought content normal.         Judgment: Judgment normal.        Result Review :                 Assessment and Plan    Diagnoses and all orders for this visit:    1. Voiding dysfunction (Primary)  -     POC Urinalysis Dipstick, Automated    2. Enuresis  -     POC Urinalysis Dipstick, Automated    Ultrasound of kidney discussed with the patient and did not show any abnormality except smaller kidneys which is age-related.  I am going to send the patient for pelvic physiotherapy and see if that will help her.  Will recheck her in 2 months time     Brief Urine Lab Results  (Last result in the past 365 days)        Color   Clarity   Blood   Leuk Est   Nitrite   Protein   CREAT   Urine HCG        04/09/24 1049 Yellow   Slightly Cloudy   Negative   Trace   Negative   Negative                    Follow Up   No follow-ups on file.  Patient was given instructions and counseling regarding her condition or for health maintenance advice. Please see specific information pulled into the AVS if appropriate.     Lemuel Pagan MD

## 2024-05-01 ENCOUNTER — TREATMENT (OUTPATIENT)
Dept: PHYSICAL THERAPY | Facility: CLINIC | Age: 89
End: 2024-05-01
Payer: MEDICARE

## 2024-05-01 DIAGNOSIS — N81.89 PELVIC FLOOR WEAKNESS: ICD-10-CM

## 2024-05-01 DIAGNOSIS — N39.41 URGE INCONTINENCE OF URINE: Primary | ICD-10-CM

## 2024-05-01 DIAGNOSIS — R32 ENURESIS: ICD-10-CM

## 2024-05-01 DIAGNOSIS — N39.8 VOIDING DYSFUNCTION: ICD-10-CM

## 2024-05-01 PROCEDURE — 97162 PT EVAL MOD COMPLEX 30 MIN: CPT | Performed by: PHYSICAL THERAPIST

## 2024-05-01 PROCEDURE — 97110 THERAPEUTIC EXERCISES: CPT | Performed by: PHYSICAL THERAPIST

## 2024-05-01 NOTE — PROGRESS NOTES
Physical Therapy Initial Evaluation and Plan of Care  [address], [place] [address]      Patient: Kristine Rahman   : 1935  Diagnosis/ICD-10 Code:  Urge incontinence of urine [N39.41]  Referring practitioner: Lemuel Pagan MD  Date of Initial Visit: 2024  Today's Date: 2024  Patient seen for 1 sessions           Subjective Questionnaire: Urinary distress inventory score 5=20-39% limitation       Subjective Evaluation    History of Present Illness  Mechanism of injury: Patient is an 88 yr old female referred to physical therapy with diagnosis of urge urinary incontinence,  Enuresis, and voiding dysfunction. Patient wears leakage protection at night and when  goes out into public. Patient states leakage 2-3 times a night/sleeping and does not wake up. Patient reports that she does not limit fluid intake prior to bedtime.     Patient Goals  Patient goals for therapy: increased strength  Patient goal: to improve urinary continence           Objective          Functional Assessment     Comments  Verbal consent obtained for internal pelvic exam/treatment.  Pelvic floor strength 2/5 with co-contraction of transverse abdominals  Patient needing cues to not valsalva with pelvic floor contraction  B hip abduction strength 3/5  B hip adduction strength 3+/5  Gluteal strength 3/5  Observation: perineal skin macerated (discussed with patient and patient states using antibiotic cream given to her years ago for her skin cancer on her perineum. Therapist educated patient to discontinue and use skin protectant made for perineum. Patient also states not using estrogen cream any longer bc it was empty and to discuss with MD)          Assessment & Plan       Assessment  Impairments: impaired physical strength and lacks appropriate home exercise program   Other impairment: Urinary incontinence  Assessment details: Pt presents with limitations, noted by evaluation that impede patient's ability to  maintain urinary continence.  The skills of a therapist will be required to safely and effectively implement the following treatment plan to restore maximal level of function.        Goals  Plan Goals: 1.Urinary incontinence      LT weeks:The patient will report 75% decrease in urinary incontinence       Status: New      ST weeks: The patient will report 50% decrease in urinary incontience        Status: New  TREATMENT:  Therapeutic exercise to increase strength and endurance of the pelvic floor, biofeedback as needed to aid in the strengthening process, patient education on extensive HEP, patient education on urge control techniques and pelvic bracing during cough, sneeze, etc.     2.  Pelvic floor weakness   LTG2: 12 weeks: Patient will present with 3+/5 strength of the pelvic floor musculature with patient reporting 75% improvement with complaints of nocturnal urinary incontinence  STATUS:  New   STG2a: 6 weeks:  Strength of the pelvic floor musculature at 3/5.   STATUS:  New   Treatment:  Therapeutic exercise to increase strength and endurance of the pelvic floor, biofeedback as needed to aid in the strengthening process, patient education on extensive HEP, patient education on urge control techniques and pelvic bracing.     3. Other PT Primary Functional Limitation     LTG 3: 12 weeks:  The patient will demonstrate 1-19% limitation by achieving a score of 2 on the urinary distress inventory.    STATUS:  New   STG 3a: 6 weeks:  The patient will demonstrate independence and compliance with home exercise program.     STATUS:  New       Plan  Therapy options: will be seen for skilled therapy services  Planned therapy interventions: abdominal trunk stabilization, strengthening, therapeutic activities and home exercise program  Frequency: 2x month  Duration in weeks: 12  Treatment plan discussed with: patient    History # of Personal Factors and/or Comorbidities: MODERATE (1-2)  Examination of Body  System(s): # of elements: MODERATE (3)  Clinical Presentation: STABLE   Clinical Decision Making: MODERATE      Visit Diagnoses:    ICD-10-CM ICD-9-CM   1. Urge incontinence of urine  N39.41 788.31   2. Enuresis  R32 788.30   3. Voiding dysfunction  N39.8 599.9   4. Pelvic floor weakness  N81.89 618.89       Timed:  Manual Therapy:         mins  45551;  Therapeutic Exercise:    12     mins  51048;     Neuromuscular Nadya:        mins  09658;    Therapeutic Activity:          mins  87937;     Gait Training:           mins  65204;     Ultrasound:          mins  60806;    Electrical Stimulation:         mins  77087 ( );    Untimed:  Electrical Stimulation:         mins  77906 ( );  Mechanical Traction:         mins  30167;    PT evaluation     Low Eval                           Mins  90517  Mod Eval                        35     Mins  16726  High Eval                           Mins  05639    Timed Treatment:   12   mins   Total Treatment:     47   mins    PT SIGNATURE: Opal Qiu PT     Electronically singed 5/1/2024    [place] PT license: [id number]        Initial Certification  Certification Period: 5/1/2024 thru 7/29/2024  I certify that the therapy services are furnished while this patient is under my care.  The services outlined above are required by this patient, and will be reviewed every 90 days.    PHYSICIAN: Lemuel Pagan MD  NPI: [id number]                                      DATE:     Please sign and return via fax to [phone]  Thank you, [facility].

## 2024-05-20 ENCOUNTER — TELEPHONE (OUTPATIENT)
Dept: PHYSICAL THERAPY | Facility: CLINIC | Age: 89
End: 2024-05-20

## 2024-05-20 NOTE — TELEPHONE ENCOUNTER
Caller: Kristine Rahman    Relationship: Self         What was the call regarding: SHE IS SICK TODAY

## 2024-05-23 ENCOUNTER — TRANSCRIBE ORDERS (OUTPATIENT)
Dept: ADMINISTRATIVE | Facility: HOSPITAL | Age: 89
End: 2024-05-23
Payer: MEDICARE

## 2024-05-23 DIAGNOSIS — Z12.31 ENCOUNTER FOR SCREENING MAMMOGRAM FOR MALIGNANT NEOPLASM OF BREAST: Primary | ICD-10-CM

## 2024-05-28 RX ORDER — AMLODIPINE BESYLATE 10 MG/1
10 TABLET ORAL DAILY
Qty: 90 TABLET | Refills: 0 | Status: SHIPPED | OUTPATIENT
Start: 2024-05-28

## 2024-06-04 ENCOUNTER — OFFICE VISIT (OUTPATIENT)
Dept: FAMILY MEDICINE CLINIC | Facility: CLINIC | Age: 89
End: 2024-06-04
Payer: MEDICARE

## 2024-06-04 VITALS
DIASTOLIC BLOOD PRESSURE: 58 MMHG | SYSTOLIC BLOOD PRESSURE: 144 MMHG | BODY MASS INDEX: 29.68 KG/M2 | HEART RATE: 54 BPM | TEMPERATURE: 97.5 F | HEIGHT: 62 IN | OXYGEN SATURATION: 95 % | WEIGHT: 161.3 LBS

## 2024-06-04 DIAGNOSIS — E78.00 PURE HYPERCHOLESTEROLEMIA: Primary | ICD-10-CM

## 2024-06-04 DIAGNOSIS — I10 ESSENTIAL HYPERTENSION: ICD-10-CM

## 2024-06-04 DIAGNOSIS — R73.03 PRE-DIABETES: ICD-10-CM

## 2024-06-04 DIAGNOSIS — Z78.0 POST-MENOPAUSAL: ICD-10-CM

## 2024-06-04 LAB
ALBUMIN SERPL-MCNC: 4.2 G/DL (ref 3.5–5.2)
ALBUMIN/GLOB SERPL: 1.6 G/DL
ALP SERPL-CCNC: 98 U/L (ref 39–117)
ALT SERPL W P-5'-P-CCNC: 13 U/L (ref 1–33)
ANION GAP SERPL CALCULATED.3IONS-SCNC: 11.4 MMOL/L (ref 5–15)
AST SERPL-CCNC: 22 U/L (ref 1–32)
BILIRUB SERPL-MCNC: 0.5 MG/DL (ref 0–1.2)
BUN SERPL-MCNC: 33 MG/DL (ref 8–23)
BUN/CREAT SERPL: 33.7 (ref 7–25)
CALCIUM SPEC-SCNC: 10 MG/DL (ref 8.6–10.5)
CHLORIDE SERPL-SCNC: 105 MMOL/L (ref 98–107)
CHOLEST SERPL-MCNC: 139 MG/DL (ref 0–200)
CO2 SERPL-SCNC: 24.6 MMOL/L (ref 22–29)
CREAT SERPL-MCNC: 0.98 MG/DL (ref 0.57–1)
EGFRCR SERPLBLD CKD-EPI 2021: 56.3 ML/MIN/1.73
GLOBULIN UR ELPH-MCNC: 2.7 GM/DL
GLUCOSE SERPL-MCNC: 94 MG/DL (ref 65–99)
HBA1C MFR BLD: 5.5 % (ref 4.8–5.6)
HDLC SERPL-MCNC: 58 MG/DL (ref 40–60)
LDLC SERPL CALC-MCNC: 64 MG/DL (ref 0–100)
LDLC/HDLC SERPL: 1.09 {RATIO}
POTASSIUM SERPL-SCNC: 4.2 MMOL/L (ref 3.5–5.2)
PROT SERPL-MCNC: 6.9 G/DL (ref 6–8.5)
SODIUM SERPL-SCNC: 141 MMOL/L (ref 136–145)
TRIGL SERPL-MCNC: 90 MG/DL (ref 0–150)
VLDLC SERPL-MCNC: 17 MG/DL (ref 5–40)

## 2024-06-04 PROCEDURE — 99214 OFFICE O/P EST MOD 30 MIN: CPT | Performed by: FAMILY MEDICINE

## 2024-06-04 PROCEDURE — 80053 COMPREHEN METABOLIC PANEL: CPT | Performed by: FAMILY MEDICINE

## 2024-06-04 PROCEDURE — 1126F AMNT PAIN NOTED NONE PRSNT: CPT | Performed by: FAMILY MEDICINE

## 2024-06-04 PROCEDURE — 83036 HEMOGLOBIN GLYCOSYLATED A1C: CPT | Performed by: FAMILY MEDICINE

## 2024-06-04 PROCEDURE — 80061 LIPID PANEL: CPT | Performed by: FAMILY MEDICINE

## 2024-06-04 NOTE — ASSESSMENT & PLAN NOTE
Her blood pressure is okay here today given her age of 88.  She will continue her current meds and continue to monitor at home.

## 2024-06-04 NOTE — ASSESSMENT & PLAN NOTE
Her A1c is consistently been very good.  I do not think her symptoms are related to her diabetes.  She does have an upcoming eye exam end of the month.

## 2024-06-04 NOTE — PROGRESS NOTES
Chief Complaint   Patient presents with    Follow-up     4 month follow up       Hyperlipidemia        Subjective     Kristine Rahman  has a past medical history of Anxiety, Arthritis, Asthma, Basal cell carcinoma, Crohn's disease, Depression, Diverticulitis, Gastric ulcer, and Hammer toe.    Prediabetes-she does not check her blood sugars at home.  She does complain of blurred vision excessive thirst and excessive urination.  She states she could watch her carbohydrates better.    Hypertension-she does check her blood pressure at home intermittently.  Her home blood pressure readings are consistently 120s over 40s.  It is higher here today at 144/58.    Hyperlipidemia-she takes her pravastatin on a nightly basis.        PHQ-2 Depression Screening  Little interest or pleasure in doing things?     Feeling down, depressed, or hopeless?     PHQ-2 Total Score     PHQ-9 Depression Screening  Little interest or pleasure in doing things?     Feeling down, depressed, or hopeless?     Trouble falling or staying asleep, or sleeping too much?     Feeling tired or having little energy?     Poor appetite or overeating?     Feeling bad about yourself - or that you are a failure or have let yourself or your family down?     Trouble concentrating on things, such as reading the newspaper or watching television?     Moving or speaking so slowly that other people could have noticed? Or the opposite - being so fidgety or restless that you have been moving around a lot more than usual?     Thoughts that you would be better off dead, or of hurting yourself in some way?     PHQ-9 Total Score     If you checked off any problems, how difficult have these problems made it for you to do your work, take care of things at home, or get along with other people?       Allergies   Allergen Reactions    Latex Unknown - Low Severity       Prior to Admission medications    Medication Sig Start Date End Date Taking? Authorizing Provider   amLODIPine  (NORVASC) 10 MG tablet TAKE 1 TABLET DAILY 5/28/24  Yes Sameer Garsia DO   aspirin 81 MG EC tablet Aspir-81 81 mg oral tablet,delayed release (DR/EC) take 1 tablet (81 mg) by oral route once daily   Active   Yes ProviderVane MD   benazepril (LOTENSIN) 40 MG tablet Take 1 tablet by mouth Daily. 2/5/24  Yes Sameer Garsia DO   Calcium Carbonate-Vit D-Min (CALCIUM 1200 PO) Take 1,200 mg by mouth Daily.   Yes ProviderVane MD   chlorthalidone (HYGROTON) 25 MG tablet Take 1 tablet by mouth Daily. 2/5/24  Yes Sameer Garsia DO   clopidogrel (PLAVIX) 75 MG tablet Take 1 tablet by mouth Daily. 2/5/24  Yes Sameer Garsia DO   estradiol (ESTRACE) 0.1 MG/GM vaginal cream Insert 2 g into the vagina Daily. 1/25/23  Yes Lemuel Pagan MD   fluticasone (FLONASE) 50 MCG/ACT nasal spray 2 sprays into the nostril(s) as directed by provider Daily. 5/25/23  Yes Fabby Hooper APRN   hydrALAZINE (APRESOLINE) 50 MG tablet Take 1 tablet by mouth 3 (Three) Times a Day. 2/5/24  Yes Sameer Garsia DO   metoprolol tartrate (LOPRESSOR) 50 MG tablet TAKE 1 TABLET TWICE A DAY  (NEW DOSAGE) 7/28/23  Yes Sameer Garsia DO   pravastatin (PRAVACHOL) 20 MG tablet Take 1 tablet by mouth Every Night. 2/5/24  Yes Sameer Garsia DO        Patient Active Problem List   Diagnosis    Anxiety    Arthritis    Asthma    Colon polyps    Crohn's disease    Depression    Pre-diabetes    Diverticulitis    Essential hypertension    Gastric ulcer    Hyperlipemia    Seasonal allergic rhinitis    Shortness of breath    Ulcerative lesion    Urinary incontinence without sensory awareness    COVID-19 virus detected    Lesion of skin of nose    Thyroid nodule    Medicare annual wellness visit, subsequent    Need for tetanus, diphtheria, and acellular pertussis (Tdap) vaccine    Need for shingles vaccine    Post-menopausal        Past Surgical History:   Procedure Laterality  "Date    CHOLECYSTECTOMY      COLONOSCOPY      HEMORRHOIDECTOMY         Social History     Socioeconomic History    Marital status:     Number of children: 3   Tobacco Use    Smoking status: Never     Passive exposure: Never    Smokeless tobacco: Never   Vaping Use    Vaping status: Never Used   Substance and Sexual Activity    Alcohol use: Not Currently    Drug use: Never    Sexual activity: Not Currently     Birth control/protection: Post-menopausal       Family History   Problem Relation Age of Onset    Colon cancer Cousin     Diabetes Daughter     Hypertension Daughter     Hypertension Son        Family history, surgical history, past medical history, Allergies and meds reviewed with patient today and updated in [facility] EMR.     ROS:  Review of Systems   Constitutional:  Positive for fatigue.   Eyes:  Positive for blurred vision.   Respiratory:  Positive for shortness of breath. Negative for cough, chest tightness and wheezing.    Cardiovascular:  Negative for chest pain and palpitations.   Gastrointestinal:  Negative for constipation.   Endocrine: Positive for polydipsia and polyuria.   Genitourinary:  Positive for urinary incontinence.   Neurological:  Negative for headache.       OBJECTIVE:  Vitals:    06/04/24 1123   BP: 144/58   BP Location: Left arm   Patient Position: Sitting   Pulse: 54   Temp: 97.5 °F (36.4 °C)   SpO2: 95%   Weight: 73.2 kg (161 lb 4.8 oz)   Height: 157.5 cm (62.01\")     No results found.   Body mass index is 29.49 kg/m².  No LMP recorded. Patient is postmenopausal.    The ASCVD Risk score (Glendale DK, et al., 2019) failed to calculate for the following reasons:    The 2019 ASCVD risk score is only valid for ages 40 to 79     Physical Exam  Vitals and nursing note reviewed.   Constitutional:       General: She is not in acute distress.     Appearance: Normal appearance. She is normal weight.   HENT:      Head: Normocephalic.      Right Ear: Tympanic membrane, ear canal and external " ear normal.      Left Ear: Tympanic membrane, ear canal and external ear normal.      Nose: Nose normal.      Mouth/Throat:      Mouth: Mucous membranes are moist.      Pharynx: Oropharynx is clear.   Eyes:      General: No scleral icterus.     Conjunctiva/sclera: Conjunctivae normal.      Pupils: Pupils are equal, round, and reactive to light.   Cardiovascular:      Rate and Rhythm: Normal rate and regular rhythm.      Pulses: Normal pulses.      Heart sounds: Normal heart sounds. No murmur heard.  Pulmonary:      Effort: Pulmonary effort is normal.      Breath sounds: Normal breath sounds. No wheezing, rhonchi or rales.   Musculoskeletal:      Cervical back: Neck supple. No rigidity or tenderness.   Lymphadenopathy:      Cervical: No cervical adenopathy.   Skin:     General: Skin is warm and dry.      Coloration: Skin is not jaundiced.      Findings: No rash.   Neurological:      General: No focal deficit present.      Mental Status: She is alert and oriented to person, place, and time.   Psychiatric:         Mood and Affect: Mood normal.         Thought Content: Thought content normal.         Judgment: Judgment normal.         Procedures    No visits with results within 30 Day(s) from this visit.   Latest known visit with results is:   Office Visit on 04/09/2024   Component Date Value Ref Range Status    Color 04/09/2024 Yellow  Yellow, Straw, Dark Yellow, Tori Final    Clarity, UA 04/09/2024 Slightly Cloudy (A)  Clear Final    Specific Gravity  04/09/2024 1.025  1.005 - 1.030 Final    pH, Urine 04/09/2024 5.5  5.0 - 8.0 Final    Leukocytes 04/09/2024 Trace (A)  Negative Final    Nitrite, UA 04/09/2024 Negative  Negative Final    Protein, POC 04/09/2024 Negative  Negative mg/dL Final    Glucose, UA 04/09/2024 Negative  Negative mg/dL Final    Ketones, UA 04/09/2024 Negative  Negative Final    Urobilinogen, UA 04/09/2024 0.2 E.U./dL  Normal, 0.2 E.U./dL Final    Bilirubin 04/09/2024 Negative  Negative Final     Blood, UA 04/09/2024 Negative  Negative Final    Lot Number 04/09/2024 [id number]   Final    Expiration Date 04/09/2024 02/28/25   Final    RBC, UA 04/09/2024 None Seen  None Seen, 0-2 /HPF Final    WBC, UA 04/09/2024 None Seen  None Seen, 0-2 /HPF Final    Bacteria, UA 04/09/2024 None Seen  None Seen /HPF Final    Crystals, UA 04/09/2024 Small/1+  /HPF Final    Epithelial Cells (non renal) 04/09/2024 0-1 vaginal cells   Final    Renal Epithel, UA 04/09/2024 0   Final       ASSESSMENT/ PLAN:    Diagnoses and all orders for this visit:    1. Pure hypercholesterolemia (Primary)  Assessment & Plan:   Will update her lipids with her labs.    Orders:  -     Comprehensive Metabolic Panel  -     Lipid Panel    2. Essential hypertension  Assessment & Plan:  Her blood pressure is okay here today given her age of 88.  She will continue her current meds and continue to monitor at home.    Orders:  -     Comprehensive Metabolic Panel  -     Lipid Panel    3. Pre-diabetes  Assessment & Plan:  Her A1c is consistently been very good.  I do not think her symptoms are related to her diabetes.  She does have an upcoming eye exam end of the month.    Orders:  -     Hemoglobin A1c    4. Post-menopausal  -     DEXA Bone Density Axial; Future               Orders Placed Today:     No orders of the defined types were placed in this encounter.       Management Plan:     An After Visit Summary was printed and given to the patient at discharge.    Follow-up: Return in about 6 months (around 12/4/2024) for Recheck.    Sameer Garsia DO 6/4/2024 11:52 EDT  This note was electronically signed.

## 2024-06-14 NOTE — PROGRESS NOTES
"Well Woman Visit    CC: Scheduled annual well gyn visit  Chief Complaint   Patient presents with    Annual Exam     Urinary frequency at night        HPI:   88 y.o.   Social History     Substance and Sexual Activity   Sexual Activity Not Currently    Birth control/protection: Post-menopausal       89 y/o  here for AP. Pt c/o urinary frequency and urgency at night. She has seen urology and had botox. She has been told to avoid drinking late at night. She does not have any new urological symptoms today. Pt c/o soreness at opening to vagina. Had mammogram. Does not need colonoscopy  Urinary QUALITY measure 66yo : sees urology urge incontinence  Pt has no complaints today.    PCP: does manage PMHx and preventative labs  History: PMHx, Meds, Allergies, PSHx, Social Hx, and POBHx all reviewed and updated.    PHYSICAL EXAM:  /69   Pulse 56   Ht 157.5 cm (62.01\")   Wt 73 kg (161 lb)   Breastfeeding No   BMI 29.44 kg/m²  Not found. Chaperone present  General- NAD, alert and oriented, appropriate  Psych- Normal mood, good memory  Neck- No masses, no thyroid enlargement  CV- Regular rhythm, no murmurs  Resp- CTA to bases, no wheezes  Abdomen- Soft, non distended, non tender, no masses    Chaperone present during breast and/or pelvic exam if performed.  Breast left-  Bilaterally symmetrical, no masses, non tender, no nipple discharge, no axillary or supraclavicular nodes palpable.    Breast right- Bilaterally symmetrical, no masses, non tender, no nipple discharge, no axillary or supraclavicular nodes palpable.      External genitalia- Normal female, +hypopigmentation c/w lichen sclerosis.   Urethra/meatus- Normal, no masses, non tender  Bladder- Normal, no masses, non tender  Vagina- Normal, no atrophy, no lesions, no discharge.      Cvx- Normal, no lesions, no discharge, No cervical motion tenderness  Uterus- Normal size, shape & consistency.  Non tender, mobile., exam sub optimal due to body " habitus  Adnexa- No mass, non tender, exam suboptimal due to body habitus  Anus/Rectum/Perineum-     Lymphatic- No palpable neck, axillary, or groin nodes  Ext- No edema, no cyanosis    Skin- No lesions, no rashes, no acanthosis nigricans      ASSESSMENT and PLAN:    Diagnoses and all orders for this visit:    1. Well woman exam with routine gynecological exam (Primary)    2. Urinary incontinence without sensory awareness    3. Lichen sclerosus    Pt has already been prescribed estrogen cream for this. She states she has not been using. Pt encouraged to use    Preventative:  BREAST HEALTH- Monthly self breast exam importance and how to reviewed. MMG and/or MRI (prn) reviewed per society guidelines and her individual history. Screen: Already up to date  CERVICAL CANCER Screening- Reviewed current ASCCP guidelines for screening w and wo cotest HPV, age specific.  Screen: Not medically needed  COLON CANCER Screening- Reviewed current medical society guidelines and options.  Screen:  Not medically needed  Follow up PCP/Specialist PMHx and/or Labs          She understands the importance of having any ordered tests to be performed in a timely fashion.  The risks of not performing them include, but are not limited to, advanced cancer stages, bone loss from osteoporosis and/or subsequent increase in morbidity and/or mortality.  She is encouraged to review her results online and/or contact or office if she has questions.     Follow Up:  Return in about 1 year (around 6/20/2025) for Annual physical.            Leticia Moreno,   06/20/2024    [facility] [place] [place] [facility]  [facility] [place] [address]  Dept: [phone]  Dept Fax: [phone]  Loc: [phone]  Loc Fax: [phone]

## 2024-06-18 ENCOUNTER — HOSPITAL ENCOUNTER (OUTPATIENT)
Dept: MAMMOGRAPHY | Facility: HOSPITAL | Age: 89
Discharge: HOME | End: 2024-06-18
Admitting: OBSTETRICS & GYNECOLOGY
Payer: MEDICARE

## 2024-06-18 DIAGNOSIS — Z12.31 ENCOUNTER FOR SCREENING MAMMOGRAM FOR MALIGNANT NEOPLASM OF BREAST: ICD-10-CM

## 2024-06-18 PROCEDURE — 77067 SCR MAMMO BI INCL CAD: CPT

## 2024-06-18 PROCEDURE — 77063 BREAST TOMOSYNTHESIS BI: CPT

## 2024-06-20 ENCOUNTER — OFFICE VISIT (OUTPATIENT)
Dept: OBSTETRICS AND GYNECOLOGY | Facility: CLINIC | Age: 89
End: 2024-06-20
Payer: MEDICARE

## 2024-06-20 VITALS
HEIGHT: 62 IN | DIASTOLIC BLOOD PRESSURE: 69 MMHG | WEIGHT: 161 LBS | SYSTOLIC BLOOD PRESSURE: 155 MMHG | HEART RATE: 56 BPM | BODY MASS INDEX: 29.63 KG/M2

## 2024-06-20 DIAGNOSIS — L90.0 LICHEN SCLEROSUS: ICD-10-CM

## 2024-06-20 DIAGNOSIS — R32 INCONTINENCE IN FEMALE: ICD-10-CM

## 2024-06-20 DIAGNOSIS — Z01.419 WELL WOMAN EXAM WITH ROUTINE GYNECOLOGICAL EXAM: Primary | ICD-10-CM

## 2024-06-20 DIAGNOSIS — N39.42 URINARY INCONTINENCE WITHOUT SENSORY AWARENESS: ICD-10-CM

## 2024-06-20 DIAGNOSIS — N95.2 ATROPHIC VAGINITIS: ICD-10-CM

## 2024-06-20 DIAGNOSIS — R32 ENURESIS: ICD-10-CM

## 2024-06-21 RX ORDER — ESTRADIOL 0.1 MG/G
CREAM VAGINAL
Qty: 42.5 G | Refills: 2 | Status: SHIPPED | OUTPATIENT
Start: 2024-06-21

## 2024-07-01 ENCOUNTER — HOSPITAL ENCOUNTER (OUTPATIENT)
Dept: BONE DENSITY | Facility: HOSPITAL | Age: 89
Discharge: HOME | End: 2024-07-01
Admitting: FAMILY MEDICINE
Payer: MEDICARE

## 2024-07-01 DIAGNOSIS — Z78.0 POST-MENOPAUSAL: ICD-10-CM

## 2024-07-01 PROCEDURE — 77080 DXA BONE DENSITY AXIAL: CPT

## 2024-07-08 ENCOUNTER — OFFICE VISIT (OUTPATIENT)
Dept: FAMILY MEDICINE CLINIC | Facility: CLINIC | Age: 89
End: 2024-07-08
Payer: MEDICARE

## 2024-07-08 VITALS
WEIGHT: 160 LBS | SYSTOLIC BLOOD PRESSURE: 156 MMHG | DIASTOLIC BLOOD PRESSURE: 44 MMHG | OXYGEN SATURATION: 95 % | HEIGHT: 62 IN | TEMPERATURE: 98.8 F | BODY MASS INDEX: 29.44 KG/M2 | HEART RATE: 64 BPM

## 2024-07-08 DIAGNOSIS — Z00.00 MEDICARE ANNUAL WELLNESS VISIT, SUBSEQUENT: Primary | ICD-10-CM

## 2024-07-08 DIAGNOSIS — J98.8 RESPIRATORY INFECTION: ICD-10-CM

## 2024-07-08 PROCEDURE — G0439 PPPS, SUBSEQ VISIT: HCPCS | Performed by: STUDENT IN AN ORGANIZED HEALTH CARE EDUCATION/TRAINING PROGRAM

## 2024-07-08 PROCEDURE — 1126F AMNT PAIN NOTED NONE PRSNT: CPT | Performed by: STUDENT IN AN ORGANIZED HEALTH CARE EDUCATION/TRAINING PROGRAM

## 2024-07-08 PROCEDURE — 1170F FXNL STATUS ASSESSED: CPT | Performed by: STUDENT IN AN ORGANIZED HEALTH CARE EDUCATION/TRAINING PROGRAM

## 2024-07-08 PROCEDURE — 99213 OFFICE O/P EST LOW 20 MIN: CPT | Performed by: STUDENT IN AN ORGANIZED HEALTH CARE EDUCATION/TRAINING PROGRAM

## 2024-07-08 RX ORDER — AMOXICILLIN AND CLAVULANATE POTASSIUM 875; 125 MG/1; MG/1
1 TABLET, FILM COATED ORAL 2 TIMES DAILY
Qty: 14 TABLET | Refills: 0 | Status: SHIPPED | OUTPATIENT
Start: 2024-07-08 | End: 2024-07-08

## 2024-07-08 RX ORDER — GUAIFENESIN 600 MG/1
1200 TABLET, EXTENDED RELEASE ORAL 2 TIMES DAILY
Qty: 28 TABLET | Refills: 0 | Status: ON HOLD | OUTPATIENT
Start: 2024-07-08 | End: 2024-07-15

## 2024-07-08 RX ORDER — AMOXICILLIN AND CLAVULANATE POTASSIUM 875; 125 MG/1; MG/1
1 TABLET, FILM COATED ORAL 2 TIMES DAILY
Qty: 14 TABLET | Refills: 0 | Status: ON HOLD | OUTPATIENT
Start: 2024-07-08 | End: 2024-07-15

## 2024-07-08 NOTE — PROGRESS NOTES
The ABCs of the Annual Wellness Visit  Subsequent Medicare Wellness Visit    Patient would like to get her medicare annual wellness.    Subjective    Kristine Rahman is a 88 y.o. female who presents for a Subsequent Medicare Wellness Visit.    The following portions of the patient's history were reviewed and   updated as appropriate: allergies, current medications, past family history, past medical history, past social history, past surgical history, and problem list.    Compared to one year ago, the patient feels her physical   health is worse.    Compared to one year ago, the patient feels her mental   health is worse.    Recent Hospitalizations:  She was not admitted to the hospital during the last year.       Current Medical Providers:  Patient Care Team:  Sameer Garsia DO as PCP - General (Family Medicine)  Judith Escalante APRN as Nurse Practitioner (Nurse Practitioner)  Lemuel Pagan MD as Consulting Physician (Urology)    Outpatient Medications Prior to Visit   Medication Sig Dispense Refill    amLODIPine (NORVASC) 10 MG tablet TAKE 1 TABLET DAILY 90 tablet 0    aspirin 81 MG EC tablet Aspir-81 81 mg oral tablet,delayed release (DR/EC) take 1 tablet (81 mg) by oral route once daily   Active      benazepril (LOTENSIN) 40 MG tablet Take 1 tablet by mouth Daily. 90 tablet 3    Calcium Carbonate-Vit D-Min (CALCIUM 1200 PO) Take 1,200 mg by mouth Daily.      chlorthalidone (HYGROTON) 25 MG tablet Take 1 tablet by mouth Daily. 90 tablet 3    clopidogrel (PLAVIX) 75 MG tablet Take 1 tablet by mouth Daily. 90 tablet 3    estradiol (ESTRACE) 0.1 MG/GM vaginal cream INSERT 2 GRAMS VAGINALLY 3 TIMES A WEEK 42.5 g 2    hydrALAZINE (APRESOLINE) 50 MG tablet Take 1 tablet by mouth 3 (Three) Times a Day. 270 tablet 1    metoprolol tartrate (LOPRESSOR) 50 MG tablet TAKE 1 TABLET TWICE A DAY  (NEW DOSAGE) 180 tablet 3    pravastatin (PRAVACHOL) 20 MG tablet Take 1 tablet by mouth Every Night. 90 tablet  3    sertraline (ZOLOFT) 100 MG tablet Take 1 tablet by mouth Daily.      azithromycin (Zithromax Z-William) 250 MG tablet Take 2 tablets by mouth on day 1, then 1 tablet daily on days 2-5 (Patient not taking: Reported on 7/8/2024) 6 tablet 0    guaifenesin-dextromethorphan 600-30 mg (MUCINEX DM)  MG tablet sustained-release 12 hour Take 2 tablets by mouth 2 (Two) Times a Day As Needed (Cough). (Patient not taking: Reported on 7/8/2024) 40 tablet 0    methylPREDNISolone (MEDROL) 4 MG dose pack Take as directed on package instructions. (Patient not taking: Reported on 7/8/2024) 21 tablet 0     No facility-administered medications prior to visit.       No opioid medication identified on active medication list. I have reviewed chart for other potential  high risk medication/s and harmful drug interactions in the elderly.        Aspirin is on active medication list. Aspirin use is indicated based on review of current medical condition/s. Pros and cons of this therapy have been discussed today. Benefits of this medication outweigh potential harm.  Patient has been encouraged to continue taking this medication.  .      Patient Active Problem List   Diagnosis    Anxiety    Arthritis    Asthma    Colon polyps    Crohn's disease    Depression    Pre-diabetes    Diverticulitis    Essential hypertension    Gastric ulcer    Hyperlipemia    Seasonal allergic rhinitis    Shortness of breath    Ulcerative lesion    Urinary incontinence without sensory awareness    COVID-19 virus detected    Lesion of skin of nose    Thyroid nodule    Medicare annual wellness visit, subsequent    Need for tetanus, diphtheria, and acellular pertussis (Tdap) vaccine    Need for shingles vaccine    Post-menopausal     Advance Care Planning   Advance Care Planning     Advance Directive is not on file.  ACP discussion was held with the patient during this visit. Patient has an advance directive (not in EMR), copy requested.     Objective    Vitals:     "24 1208   BP: 156/44   BP Location: Left arm   Patient Position: Sitting   Pulse: 64   Temp: 98.8 °F (37.1 °C)   TempSrc: Temporal   SpO2: 95%   Weight: 72.6 kg (160 lb)   Height: 156.2 cm (61.5\")     Estimated body mass index is 29.74 kg/m² as calculated from the following:    Height as of this encounter: 156.2 cm (61.5\").    Weight as of this encounter: 72.6 kg (160 lb).          Sameer Garsia DO     PCP - General, Family Medicine     Since 2022     [phone]     Other Patient Care Team Members   Judith Escalante APRN     Nurse Practitioner, Nurse Practitioner     Since 2022     [phone]       Lemuel Pagan MD     Consulting Physician, Urology     Since 2024     [phone]       Does the patient have evidence of cognitive impairment? No    Lab Results   Component Value Date    TRIG 90 2024    HDL 58 2024    LDL 64 2024    VLDL 17 2024    HGBA1C 5.50 2024        HEALTH RISK ASSESSMENT    Smoking Status:  Social History     Tobacco Use   Smoking Status Never    Passive exposure: Never   Smokeless Tobacco Never     Alcohol Consumption:  Social History     Substance and Sexual Activity   Alcohol Use Not Currently     Fall Risk Screen:    SHADIA Fall Risk Assessment was completed, and patient is at HIGH risk for falls. Assessment completed on:2024    Depression Screenin/2/2024    11:44 AM   PHQ-2/PHQ-9 Depression Screening   Little Interest or Pleasure in Doing Things 0-->not at all   Feeling Down, Depressed or Hopeless 1-->several days   PHQ-9: Brief Depression Severity Measure Score 1       Health Habits and Functional and Cognitive Screenin/8/2024    12:00 PM   Functional & Cognitive Status   Do you have difficulty preparing food and eating? No   Do you have difficulty bathing yourself, getting dressed or grooming yourself? No   Do you have difficulty using the toilet? No   Do you have difficulty moving around from " place to place? No   Do you have trouble with steps or getting out of a bed or a chair? Yes   Current Diet Unhealthy Diet   Dental Exam Up to date   Eye Exam Up to date   Exercise (times per week) 2 times per week   Current Exercises Include Yard Work   Do you need help using the phone?  Yes   Are you deaf or do you have serious difficulty hearing?  No   Do you need help to go to places out of walking distance? No   Do you need help shopping? No   Do you need help preparing meals?  No   Do you need help with housework?  Yes   Do you need help with laundry? No   Do you need help taking your medications? No   Do you need help managing money? No   Do you ever drive or ride in a car without wearing a seat belt? No   Have you felt unusual stress, anger or loneliness in the last month? Yes   Who do you live with? Other   If you need help, do you have trouble finding someone available to you? No   Have you been bothered in the last four weeks by sexual problems? No   Do you have difficulty concentrating, remembering or making decisions? Yes       Age-appropriate Screening Schedule:  Refer to the list below for future screening recommendations based on patient's age, sex and/or medical conditions. Orders for these recommended tests are listed in the plan section. The patient has been provided with a written plan.    Health Maintenance   Topic Date Due    DIABETIC EYE EXAM  10/23/2021    ANNUAL WELLNESS VISIT  05/08/2024    COVID-19 Vaccine (9 - 2023-24 season) 08/24/2024 (Originally 2/27/2024)    RSV Vaccine - Adults (1 - 1-dose 60+ series) 09/02/2024 (Originally 10/24/1997)    INFLUENZA VACCINE  08/01/2024    HEMOGLOBIN A1C  12/04/2024    URINE MICROALBUMIN  02/02/2025    BMI FOLLOWUP  04/09/2025    LIPID PANEL  06/04/2025    DXA SCAN  07/01/2026    TDAP/TD VACCINES (3 - Td or Tdap) 05/21/2033    ZOSTER VACCINE  Completed    Pneumococcal Vaccine 65+  Discontinued                  CMS Preventative Services Quick  "Reference  Risk Factors Identified During Encounter  None Identified  The above risks/problems have been discussed with the patient.  Pertinent information has been shared with the patient in the After Visit Summary.  An After Visit Summary and PPPS were made available to the patient.    Follow Up:   Next Medicare Wellness visit to be scheduled in 1 year.       Additional E&M Note during same encounter follows:  Patient has multiple medical problems which are significant and separately identifiable that require additional work above and beyond the Medicare Wellness Visit.      Chief Complaint  Generalized Body Aches and URI (Tested negative for covid, flu, rsv, and strep on 06/28/2024. /Started two weeks ago )    Subjective        HPI  Kristine Rahman is also being seen today for upper respiratory infection. Pt went to the ED and she was started on azithromycin. Pt refers productive cough but she is not getting worse.          Objective   Vital Signs:  /44 (BP Location: Left arm, Patient Position: Sitting)   Pulse 64   Temp 98.8 °F (37.1 °C) (Temporal)   Ht 156.2 cm (61.5\")   Wt 72.6 kg (160 lb)   SpO2 95%   BMI 29.74 kg/m²     Physical Exam  Vitals reviewed.   HENT:      Head: Normocephalic.      Mouth/Throat:      Mouth: Mucous membranes are moist.   Eyes:      Pupils: Pupils are equal, round, and reactive to light.   Cardiovascular:      Rate and Rhythm: Normal rate.   Abdominal:      General: Abdomen is flat.   Musculoskeletal:         General: Normal range of motion.      Cervical back: Normal range of motion.   Skin:     General: Skin is warm.      Capillary Refill: Capillary refill takes less than 2 seconds.   Neurological:      Mental Status: She is alert.               Assessment and Plan   Diagnoses and all orders for this visit:    1. Medicare annual wellness visit, subsequent (Primary)    2. Respiratory infection    Other orders  -     Discontinue: amoxicillin-clavulanate (AUGMENTIN) 875-125 " MG per tablet; Take 1 tablet by mouth 2 (Two) Times a Day for 7 days.  Dispense: 14 tablet; Refill: 0  -     amoxicillin-clavulanate (AUGMENTIN) 875-125 MG per tablet; Take 1 tablet by mouth 2 (Two) Times a Day for 7 days.  Dispense: 14 tablet; Refill: 0  -     guaiFENesin (Mucinex) 600 MG 12 hr tablet; Take 2 tablets by mouth 2 (Two) Times a Day for 7 days.  Dispense: 28 tablet; Refill: 0           I spent 30 minutes caring for Kristine on this date of service. This time includes time spent by me in the following activities:preparing for the visit, reviewing tests, obtaining and/or reviewing a separately obtained history, performing a medically appropriate examination and/or evaluation , counseling and educating the patient/family/caregiver, ordering medications, tests, or procedures, referring and communicating with other health care professionals , documenting information in the medical record, independently interpreting results and communicating that information with the patient/family/caregiver, and care coordination  Follow Up   No follow-ups on file.  Patient was given instructions and counseling regarding her condition or for health maintenance advice. Please see specific information pulled into the AVS if appropriate.

## 2024-07-13 ENCOUNTER — APPOINTMENT (OUTPATIENT)
Dept: GENERAL RADIOLOGY | Facility: HOSPITAL | Age: 89
End: 2024-07-13
Payer: MEDICARE

## 2024-07-13 ENCOUNTER — HOSPITAL ENCOUNTER (INPATIENT)
Facility: HOSPITAL | Age: 89
LOS: 3 days | Discharge: HOME | End: 2024-07-16
Attending: EMERGENCY MEDICINE | Admitting: INTERNAL MEDICINE
Payer: MEDICARE

## 2024-07-13 ENCOUNTER — APPOINTMENT (OUTPATIENT)
Dept: CT IMAGING | Facility: HOSPITAL | Age: 89
End: 2024-07-13
Payer: MEDICARE

## 2024-07-13 DIAGNOSIS — J96.01 ACUTE HYPOXIC RESPIRATORY FAILURE: ICD-10-CM

## 2024-07-13 DIAGNOSIS — R07.81 PLEURITIC CHEST PAIN: Primary | ICD-10-CM

## 2024-07-13 LAB
ALBUMIN SERPL-MCNC: 3.8 G/DL (ref 3.5–5.2)
ALBUMIN/GLOB SERPL: 1.8 G/DL
ALP SERPL-CCNC: 78 U/L (ref 39–117)
ALT SERPL W P-5'-P-CCNC: 12 U/L (ref 1–33)
ANION GAP SERPL CALCULATED.3IONS-SCNC: 10.1 MMOL/L (ref 5–15)
AST SERPL-CCNC: 16 U/L (ref 1–32)
B PARAPERT DNA SPEC QL NAA+PROBE: NOT DETECTED
B PERT DNA SPEC QL NAA+PROBE: NOT DETECTED
BASOPHILS # BLD AUTO: 0.03 10*3/MM3 (ref 0–0.2)
BASOPHILS NFR BLD AUTO: 0.2 % (ref 0–1.5)
BILIRUB SERPL-MCNC: 0.5 MG/DL (ref 0–1.2)
BUN SERPL-MCNC: 29 MG/DL (ref 8–23)
BUN/CREAT SERPL: 31.5 (ref 7–25)
C PNEUM DNA NPH QL NAA+NON-PROBE: NOT DETECTED
CALCIUM SPEC-SCNC: 8.8 MG/DL (ref 8.6–10.5)
CHLORIDE SERPL-SCNC: 105 MMOL/L (ref 98–107)
CO2 SERPL-SCNC: 24.9 MMOL/L (ref 22–29)
CREAT SERPL-MCNC: 0.92 MG/DL (ref 0.57–1)
D-LACTATE SERPL-SCNC: 0.8 MMOL/L (ref 0.5–2)
DEPRECATED RDW RBC AUTO: 44.3 FL (ref 37–54)
EGFRCR SERPLBLD CKD-EPI 2021: 60.8 ML/MIN/1.73
ELLIPTOCYTES BLD QL SMEAR: NORMAL
EOSINOPHIL # BLD AUTO: 0.1 10*3/MM3 (ref 0–0.4)
EOSINOPHIL NFR BLD AUTO: 0.7 % (ref 0.3–6.2)
ERYTHROCYTE [DISTWIDTH] IN BLOOD BY AUTOMATED COUNT: 12.8 % (ref 12.3–15.4)
FLUAV SUBTYP SPEC NAA+PROBE: NOT DETECTED
FLUBV RNA ISLT QL NAA+PROBE: NOT DETECTED
GEN 5 2HR TROPONIN T REFLEX: 20 NG/L
GLOBULIN UR ELPH-MCNC: 2.1 GM/DL
GLUCOSE SERPL-MCNC: 148 MG/DL (ref 65–99)
HADV DNA SPEC NAA+PROBE: NOT DETECTED
HCOV 229E RNA SPEC QL NAA+PROBE: NOT DETECTED
HCOV HKU1 RNA SPEC QL NAA+PROBE: NOT DETECTED
HCOV NL63 RNA SPEC QL NAA+PROBE: NOT DETECTED
HCOV OC43 RNA SPEC QL NAA+PROBE: NOT DETECTED
HCT VFR BLD AUTO: 38.1 % (ref 34–46.6)
HGB BLD-MCNC: 13.1 G/DL (ref 12–15.9)
HMPV RNA NPH QL NAA+NON-PROBE: NOT DETECTED
HOLD SPECIMEN: NORMAL
HOLD SPECIMEN: NORMAL
HPIV1 RNA ISLT QL NAA+PROBE: NOT DETECTED
HPIV2 RNA SPEC QL NAA+PROBE: NOT DETECTED
HPIV3 RNA NPH QL NAA+PROBE: NOT DETECTED
HPIV4 P GENE NPH QL NAA+PROBE: NOT DETECTED
IMM GRANULOCYTES # BLD AUTO: 0.06 10*3/MM3 (ref 0–0.05)
IMM GRANULOCYTES NFR BLD AUTO: 0.4 % (ref 0–0.5)
L PNEUMO1 AG UR QL IA: NEGATIVE
LIPASE SERPL-CCNC: 15 U/L (ref 13–60)
LYMPHOCYTES # BLD AUTO: 0.77 10*3/MM3 (ref 0.7–3.1)
LYMPHOCYTES NFR BLD AUTO: 5.6 % (ref 19.6–45.3)
M PNEUMO IGG SER IA-ACNC: NOT DETECTED
MAGNESIUM SERPL-MCNC: 1.7 MG/DL (ref 1.6–2.4)
MCH RBC QN AUTO: 32.3 PG (ref 26.6–33)
MCHC RBC AUTO-ENTMCNC: 34.4 G/DL (ref 31.5–35.7)
MCV RBC AUTO: 93.8 FL (ref 79–97)
MONOCYTES # BLD AUTO: 0.88 10*3/MM3 (ref 0.1–0.9)
MONOCYTES NFR BLD AUTO: 6.4 % (ref 5–12)
NEUTROPHILS NFR BLD AUTO: 11.92 10*3/MM3 (ref 1.7–7)
NEUTROPHILS NFR BLD AUTO: 86.7 % (ref 42.7–76)
NRBC BLD AUTO-RTO: 0 /100 WBC (ref 0–0.2)
NT-PROBNP SERPL-MCNC: 940.1 PG/ML (ref 0–1800)
OVALOCYTES BLD QL SMEAR: NORMAL
PLAT MORPH BLD: NORMAL
PLATELET # BLD AUTO: 118 10*3/MM3 (ref 140–450)
PMV BLD AUTO: 10.9 FL (ref 6–12)
POIKILOCYTOSIS BLD QL SMEAR: NORMAL
POTASSIUM SERPL-SCNC: 3.4 MMOL/L (ref 3.5–5.2)
PROCALCITONIN SERPL-MCNC: 0.09 NG/ML (ref 0–0.25)
PROCALCITONIN SERPL-MCNC: 0.18 NG/ML (ref 0–0.25)
PROT SERPL-MCNC: 5.9 G/DL (ref 6–8.5)
RBC # BLD AUTO: 4.06 10*6/MM3 (ref 3.77–5.28)
RHINOVIRUS RNA SPEC NAA+PROBE: NOT DETECTED
RSV RNA NPH QL NAA+NON-PROBE: NOT DETECTED
S PNEUM AG SPEC QL LA: NEGATIVE
SARS-COV-2 RNA RESP QL NAA+PROBE: NOT DETECTED
SODIUM SERPL-SCNC: 140 MMOL/L (ref 136–145)
TROPONIN T DELTA: 1 NG/L
TROPONIN T SERPL HS-MCNC: 19 NG/L
WBC MORPH BLD: NORMAL
WBC NRBC COR # BLD AUTO: 13.76 10*3/MM3 (ref 3.4–10.8)
WHOLE BLOOD HOLD COAG: NORMAL
WHOLE BLOOD HOLD SPECIMEN: NORMAL

## 2024-07-13 PROCEDURE — 87040 BLOOD CULTURE FOR BACTERIA: CPT | Performed by: EMERGENCY MEDICINE

## 2024-07-13 PROCEDURE — 87449 NOS EACH ORGANISM AG IA: CPT | Performed by: INTERNAL MEDICINE

## 2024-07-13 PROCEDURE — 80053 COMPREHEN METABOLIC PANEL: CPT

## 2024-07-13 PROCEDURE — 25810000003 LACTATED RINGERS PER 1000 ML: Performed by: INTERNAL MEDICINE

## 2024-07-13 PROCEDURE — 93005 ELECTROCARDIOGRAM TRACING: CPT | Performed by: EMERGENCY MEDICINE

## 2024-07-13 PROCEDURE — 25810000003 SODIUM CHLORIDE 0.9 % SOLUTION: Performed by: EMERGENCY MEDICINE

## 2024-07-13 PROCEDURE — 84484 ASSAY OF TROPONIN QUANT: CPT

## 2024-07-13 PROCEDURE — 25510000001 IOPAMIDOL PER 1 ML: Performed by: EMERGENCY MEDICINE

## 2024-07-13 PROCEDURE — 83690 ASSAY OF LIPASE: CPT

## 2024-07-13 PROCEDURE — 87070 CULTURE OTHR SPECIMN AEROBIC: CPT | Performed by: INTERNAL MEDICINE

## 2024-07-13 PROCEDURE — 87205 SMEAR GRAM STAIN: CPT | Performed by: INTERNAL MEDICINE

## 2024-07-13 PROCEDURE — 36415 COLL VENOUS BLD VENIPUNCTURE: CPT

## 2024-07-13 PROCEDURE — 83735 ASSAY OF MAGNESIUM: CPT

## 2024-07-13 PROCEDURE — 85025 COMPLETE CBC W/AUTO DIFF WBC: CPT

## 2024-07-13 PROCEDURE — 84145 PROCALCITONIN (PCT): CPT | Performed by: EMERGENCY MEDICINE

## 2024-07-13 PROCEDURE — 25010000002 ENOXAPARIN PER 10 MG: Performed by: INTERNAL MEDICINE

## 2024-07-13 PROCEDURE — 93010 ELECTROCARDIOGRAM REPORT: CPT | Performed by: SPECIALIST

## 2024-07-13 PROCEDURE — 71260 CT THORAX DX C+: CPT

## 2024-07-13 PROCEDURE — 85007 BL SMEAR W/DIFF WBC COUNT: CPT

## 2024-07-13 PROCEDURE — 99285 EMERGENCY DEPT VISIT HI MDM: CPT

## 2024-07-13 PROCEDURE — 99223 1ST HOSP IP/OBS HIGH 75: CPT | Performed by: INTERNAL MEDICINE

## 2024-07-13 PROCEDURE — 71045 X-RAY EXAM CHEST 1 VIEW: CPT

## 2024-07-13 PROCEDURE — 93005 ELECTROCARDIOGRAM TRACING: CPT

## 2024-07-13 PROCEDURE — 25010000002 AZITHROMYCIN PER 500 MG: Performed by: EMERGENCY MEDICINE

## 2024-07-13 PROCEDURE — 83605 ASSAY OF LACTIC ACID: CPT | Performed by: EMERGENCY MEDICINE

## 2024-07-13 PROCEDURE — 25010000002 CEFTRIAXONE PER 250 MG: Performed by: EMERGENCY MEDICINE

## 2024-07-13 PROCEDURE — 84145 PROCALCITONIN (PCT): CPT | Performed by: INTERNAL MEDICINE

## 2024-07-13 PROCEDURE — 83880 ASSAY OF NATRIURETIC PEPTIDE: CPT

## 2024-07-13 PROCEDURE — 93005 ELECTROCARDIOGRAM TRACING: CPT | Performed by: INTERNAL MEDICINE

## 2024-07-13 PROCEDURE — 0202U NFCT DS 22 TRGT SARS-COV-2: CPT | Performed by: INTERNAL MEDICINE

## 2024-07-13 RX ORDER — ONDANSETRON 4 MG/1
4 TABLET, ORALLY DISINTEGRATING ORAL EVERY 6 HOURS PRN
Status: DISCONTINUED | OUTPATIENT
Start: 2024-07-13 | End: 2024-07-16 | Stop reason: HOSPADM

## 2024-07-13 RX ORDER — BISACODYL 5 MG/1
5 TABLET, DELAYED RELEASE ORAL DAILY PRN
Status: DISCONTINUED | OUTPATIENT
Start: 2024-07-13 | End: 2024-07-16 | Stop reason: HOSPADM

## 2024-07-13 RX ORDER — SODIUM CHLORIDE, SODIUM LACTATE, POTASSIUM CHLORIDE, CALCIUM CHLORIDE 600; 310; 30; 20 MG/100ML; MG/100ML; MG/100ML; MG/100ML
100 INJECTION, SOLUTION INTRAVENOUS CONTINUOUS
Status: ACTIVE | OUTPATIENT
Start: 2024-07-13 | End: 2024-07-13

## 2024-07-13 RX ORDER — ACETAMINOPHEN 325 MG/1
650 TABLET ORAL EVERY 4 HOURS PRN
Status: DISCONTINUED | OUTPATIENT
Start: 2024-07-13 | End: 2024-07-16 | Stop reason: HOSPADM

## 2024-07-13 RX ORDER — AMOXICILLIN 250 MG
2 CAPSULE ORAL 2 TIMES DAILY PRN
Status: DISCONTINUED | OUTPATIENT
Start: 2024-07-13 | End: 2024-07-16 | Stop reason: HOSPADM

## 2024-07-13 RX ORDER — ACETAMINOPHEN 160 MG/5ML
650 SOLUTION ORAL EVERY 4 HOURS PRN
Status: DISCONTINUED | OUTPATIENT
Start: 2024-07-13 | End: 2024-07-16 | Stop reason: HOSPADM

## 2024-07-13 RX ORDER — BISACODYL 10 MG
10 SUPPOSITORY, RECTAL RECTAL DAILY PRN
Status: DISCONTINUED | OUTPATIENT
Start: 2024-07-13 | End: 2024-07-16 | Stop reason: HOSPADM

## 2024-07-13 RX ORDER — POTASSIUM CHLORIDE 750 MG/1
40 CAPSULE, EXTENDED RELEASE ORAL ONCE
Status: COMPLETED | OUTPATIENT
Start: 2024-07-13 | End: 2024-07-13

## 2024-07-13 RX ORDER — POLYETHYLENE GLYCOL 3350 17 G/17G
17 POWDER, FOR SOLUTION ORAL DAILY PRN
Status: DISCONTINUED | OUTPATIENT
Start: 2024-07-13 | End: 2024-07-16 | Stop reason: HOSPADM

## 2024-07-13 RX ORDER — ASPIRIN 81 MG/1
81 TABLET, CHEWABLE ORAL DAILY
COMMUNITY
End: 2024-07-16 | Stop reason: HOSPADM

## 2024-07-13 RX ORDER — SODIUM CHLORIDE 0.9 % (FLUSH) 0.9 %
10 SYRINGE (ML) INJECTION AS NEEDED
Status: DISCONTINUED | OUTPATIENT
Start: 2024-07-13 | End: 2024-07-13

## 2024-07-13 RX ORDER — ENOXAPARIN SODIUM 100 MG/ML
40 INJECTION SUBCUTANEOUS EVERY 24 HOURS
Status: DISCONTINUED | OUTPATIENT
Start: 2024-07-13 | End: 2024-07-15

## 2024-07-13 RX ORDER — SODIUM CHLORIDE 0.9 % (FLUSH) 0.9 %
10 SYRINGE (ML) INJECTION AS NEEDED
Status: DISCONTINUED | OUTPATIENT
Start: 2024-07-13 | End: 2024-07-16 | Stop reason: HOSPADM

## 2024-07-13 RX ORDER — ASPIRIN 81 MG/1
324 TABLET, CHEWABLE ORAL ONCE
Status: DISCONTINUED | OUTPATIENT
Start: 2024-07-13 | End: 2024-07-13

## 2024-07-13 RX ORDER — SODIUM CHLORIDE 9 MG/ML
40 INJECTION, SOLUTION INTRAVENOUS AS NEEDED
Status: DISCONTINUED | OUTPATIENT
Start: 2024-07-13 | End: 2024-07-16 | Stop reason: HOSPADM

## 2024-07-13 RX ORDER — ACETAMINOPHEN 650 MG/1
650 SUPPOSITORY RECTAL EVERY 4 HOURS PRN
Status: DISCONTINUED | OUTPATIENT
Start: 2024-07-13 | End: 2024-07-16 | Stop reason: HOSPADM

## 2024-07-13 RX ORDER — SODIUM CHLORIDE 0.9 % (FLUSH) 0.9 %
10 SYRINGE (ML) INJECTION EVERY 12 HOURS SCHEDULED
Status: DISCONTINUED | OUTPATIENT
Start: 2024-07-13 | End: 2024-07-16 | Stop reason: HOSPADM

## 2024-07-13 RX ORDER — MULTIPLE VITAMINS W/ MINERALS TAB 9MG-400MCG
1 TAB ORAL DAILY
COMMUNITY

## 2024-07-13 RX ADMIN — ENOXAPARIN SODIUM 40 MG: 100 INJECTION SUBCUTANEOUS at 11:43

## 2024-07-13 RX ADMIN — CEFTRIAXONE SODIUM 1000 MG: 1 INJECTION, POWDER, FOR SOLUTION INTRAMUSCULAR; INTRAVENOUS at 08:28

## 2024-07-13 RX ADMIN — Medication 10 ML: at 11:01

## 2024-07-13 RX ADMIN — IOPAMIDOL 100 ML: 755 INJECTION, SOLUTION INTRAVENOUS at 08:07

## 2024-07-13 RX ADMIN — SODIUM CHLORIDE, POTASSIUM CHLORIDE, SODIUM LACTATE AND CALCIUM CHLORIDE 100 ML/HR: 600; 310; 30; 20 INJECTION, SOLUTION INTRAVENOUS at 10:42

## 2024-07-13 RX ADMIN — POTASSIUM CHLORIDE 40 MEQ: 750 CAPSULE, EXTENDED RELEASE ORAL at 10:00

## 2024-07-13 RX ADMIN — AZITHROMYCIN 500 MG: 500 INJECTION, POWDER, LYOPHILIZED, FOR SOLUTION INTRAVENOUS at 08:29

## 2024-07-13 RX ADMIN — ACETAMINOPHEN 650 MG: 325 TABLET ORAL at 13:22

## 2024-07-13 NOTE — ED PROVIDER NOTES
Time: 6:58 AM EDT  Date of encounter:  7/13/2024  Independent Historian/Clinical History and Information was obtained by:   Patient    History is limited by: N/A    Chief Complaint: Chest pain, pain with breathing        History of Present Illness:  Patient is a 88 y.o. year old female with history of hypertension and hyperlipidemia who presents to the emergency department for evaluation of anterior chest pain radiating up into her jaw and upper back today that occurred after she finished mowing the lawn on her riding mower last night.    Chest pain seems to be worse with breathing deep.  EMS gave her aspirin and 2 sublingual nitroglycerin.  She still has chest pain now.    She states that she has had a cough and congestion and the Z-William antibiotic she was prescribed has not been helping.    She thinks she may have had some fevers and chills.    She denies any history of blood clots or heart attacks or stents.  No lower extremity swelling or calf pain.    HPI    Patient Care Team  Primary Care Provider: Sameer Garsia DO    Past Medical History:     Allergies   Allergen Reactions    Latex Unknown - Low Severity     Past Medical History:   Diagnosis Date    Anxiety     Arthritis     Asthma     Basal cell carcinoma     on nose    Crohn's disease     Depression     Diverticulitis     Gastric ulcer     Hammer toe     Hyperlipidemia     Hypertension      Past Surgical History:   Procedure Laterality Date    CHOLECYSTECTOMY      COLONOSCOPY      HEMORRHOIDECTOMY       Family History   Problem Relation Age of Onset    Colon cancer Cousin     Diabetes Daughter     Hypertension Daughter     Hypertension Son        Home Medications:  Prior to Admission medications    Medication Sig Start Date End Date Taking? Authorizing Provider   amLODIPine (NORVASC) 10 MG tablet TAKE 1 TABLET DAILY 5/28/24   Sameer Garsia DO   amoxicillin-clavulanate (AUGMENTIN) 875-125 MG per tablet Take 1 tablet by mouth 2 (Two)  Times a Day for 7 days. 7/8/24 7/15/24  Paolo Syed MD   aspirin 81 MG EC tablet Aspir-81 81 mg oral tablet,delayed release (DR/EC) take 1 tablet (81 mg) by oral route once daily   Active    Provider, MD Vane   azithromycin (Zithromax Z-William) 250 MG tablet Take 2 tablets by mouth on day 1, then 1 tablet daily on days 2-5  Patient not taking: Reported on 7/8/2024 6/28/24   Bhavani Hwang APRN   benazepril (LOTENSIN) 40 MG tablet Take 1 tablet by mouth Daily. 2/5/24   Sameer Garsia DO   Calcium Carbonate-Vit D-Min (CALCIUM 1200 PO) Take 1,200 mg by mouth Daily.    Provider, MD Vane   chlorthalidone (HYGROTON) 25 MG tablet Take 1 tablet by mouth Daily. 2/5/24   Sameer Garsia DO   clopidogrel (PLAVIX) 75 MG tablet Take 1 tablet by mouth Daily. 2/5/24   Sameer Garsia DO   estradiol (ESTRACE) 0.1 MG/GM vaginal cream INSERT 2 GRAMS VAGINALLY 3 TIMES A WEEK 6/21/24   Lemuel Pagan MD   guaiFENesin (Mucinex) 600 MG 12 hr tablet Take 2 tablets by mouth 2 (Two) Times a Day for 7 days. 7/8/24 7/15/24  Paolo Syed MD   guaifenesin-dextromethorphan 600-30 mg (MUCINEX DM)  MG tablet sustained-release 12 hour Take 2 tablets by mouth 2 (Two) Times a Day As Needed (Cough).  Patient not taking: Reported on 7/8/2024 6/28/24   Bhavani Hwang APRN   hydrALAZINE (APRESOLINE) 50 MG tablet Take 1 tablet by mouth 3 (Three) Times a Day. 2/5/24   Sameer Garsia DO   metoprolol tartrate (LOPRESSOR) 50 MG tablet TAKE 1 TABLET TWICE A DAY  (NEW DOSAGE) 7/28/23   Sameer Garsia DO   pravastatin (PRAVACHOL) 20 MG tablet Take 1 tablet by mouth Every Night. 2/5/24   Sameer Garsia DO   sertraline (ZOLOFT) 100 MG tablet Take 1 tablet by mouth Daily.    Provider, Historical, MD        Social History:   Social History     Tobacco Use    Smoking status: Never     Passive exposure: Never    Smokeless tobacco: Never   Vaping Use  "   Vaping status: Never Used   Substance Use Topics    Alcohol use: Not Currently    Drug use: Never         Review of Systems:  Review of Systems   I performed a 10 point review of systems which was all negative, except for the positives found in the HPI above.      Physical Exam:  /87 (BP Location: Left arm, Patient Position: Lying)   Pulse 76   Temp 99.1 °F (37.3 °C) (Rectal)   Resp 18   Ht 156.2 cm (61.5\")   Wt 77.8 kg (171 lb 8.3 oz)   SpO2 92%   BMI 31.88 kg/m²       Physical Exam   General: Awake alert and in no obvious distress, feels warm to the touch.    HEENT: Head normocephalic atraumatic, eyes PERRLA EOMI, nose normal, oropharynx normal.    Neck: Supple full range of motion, no meningismus, no lymphadenopathy    Heart: Regular rate and rhythm, no murmurs or rubs, 2+ radial pulses bilaterally    Lungs: Clear to auscultation bilaterally without wheezes or crackles, no respiratory distress    Abdomen: Soft, nontender, nondistended, no rebound or guarding    Skin: Warm, dry, no rash    Musculoskeletal: Normal range of motion, no lower extremity edema or calf tenderness    Neurologic: Oriented x3, no motor deficits no sensory deficits    Psychiatric: Mood appears stable, no psychosis          Procedures:  Procedures      Medical Decision Making:      Comorbidities that affect care:    Hypertension    External Notes reviewed:    None      The following orders were placed and all results were independently analyzed by me:  Orders Placed This Encounter   Procedures    Blood Culture - Blood,    Blood Culture - Blood,    XR Chest 1 View    CT Chest With Contrast Diagnostic    [place] Draw    High Sensitivity Troponin T    Comprehensive Metabolic Panel    Lipase    BNP    Magnesium    CBC Auto Differential    Scan Slide    High Sensitivity Troponin T 2Hr    Lactic Acid, Plasma    Procalcitonin    NPO Diet NPO Type: Strict NPO    Undress & Gown    Continuous Pulse Oximetry    Check Temperature    " Hospitalist (on-call MD unless specified)    Oxygen Therapy- Nasal Cannula; Titrate 1-6 LPM Per SpO2; 90 - 95%    ECG 12 Lead ED Triage Standing Order; Chest Pain    ECG 12 Lead ED Triage Standing Order; Chest Pain    Insert Peripheral IV    CBC & Differential    Green Top (Gel)    Lavender Top    Gold Top - SST    Light Blue Top       Medications Given in the Emergency Department:  Medications   sodium chloride 0.9 % flush 10 mL (has no administration in time range)   aspirin chewable tablet 324 mg (324 mg Oral Not Given 7/13/24 0223)   cefTRIAXone (ROCEPHIN) 1,000 mg in sodium chloride 0.9 % 100 mL IVPB-VTB (has no administration in time range)   AZITHROMYCIN 500 MG/250 ML 0.9% NS IVPB (vial-mate) (has no administration in time range)   iopamidol (ISOVUE-370) 76 % injection 100 mL (100 mL Intravenous Given 7/13/24 0807)        ED Course:    ED Course as of 07/13/24 0812   Sat Jul 13, 2024   0638 EKG: I interpreted her twelve-lead EKG as normal sinus rhythm at 73 bpm, normal P waves, QRS showing possible LVH otherwise normal, normal ST segments and T waves; no acute ischemia or ectopy.  Normal intervals. [VS]      ED Course User Index  [VS] Colby Jain MD       Labs:    Lab Results (last 24 hours)       Procedure Component Value Units Date/Time    High Sensitivity Troponin T [[id number]]  (Abnormal) Collected: 07/13/24 0230    Specimen: Blood from Arm, Left Updated: 07/13/24 0300     HS Troponin T 19 ng/L     Narrative:      High Sensitive Troponin T Reference Range:  <14.0 ng/L- Negative Female for AMI  <22.0 ng/L- Negative Male for AMI  >=14 - Abnormal Female indicating possible myocardial injury.  >=22 - Abnormal Male indicating possible myocardial injury.   Clinicians would have to utilize clinical acumen, EKG, Troponin, and serial changes to determine if it is an Acute Myocardial Infarction or myocardial injury due to an underlying chronic condition.         CBC & Differential [[id number]]  (Abnormal)  Collected: 07/13/24 0230    Specimen: Blood from Arm, Left Updated: 07/13/24 0257    Narrative:      The following orders were created for panel order CBC & Differential.  Procedure                               Abnormality         Status                     ---------                               -----------         ------                     CBC Auto Differential[[id number]]        Abnormal            Final result               Scan Slide[[id number]]                                       Final result                 Please view results for these tests on the individual orders.    Comprehensive Metabolic Panel [[id number]]  (Abnormal) Collected: 07/13/24 0230    Specimen: Blood from Arm, Left Updated: 07/13/24 0300     Glucose 148 mg/dL      BUN 29 mg/dL      Creatinine 0.92 mg/dL      Sodium 140 mmol/L      Potassium 3.4 mmol/L      Chloride 105 mmol/L      CO2 24.9 mmol/L      Calcium 8.8 mg/dL      Total Protein 5.9 g/dL      Albumin 3.8 g/dL      ALT (SGPT) 12 U/L      AST (SGOT) 16 U/L      Alkaline Phosphatase 78 U/L      Total Bilirubin 0.5 mg/dL      Globulin 2.1 gm/dL      A/G Ratio 1.8 g/dL      BUN/Creatinine Ratio 31.5     Anion Gap 10.1 mmol/L      eGFR 60.8 mL/min/1.73     Narrative:      GFR Normal >60  Chronic Kidney Disease <60  Kidney Failure <15    The GFR formula is only valid for adults with stable renal function between ages 18 and 70.    Lipase [[id number]]  (Normal) Collected: 07/13/24 0230    Specimen: Blood from Arm, Left Updated: 07/13/24 0300     Lipase 15 U/L     BNP [[id number]]  (Normal) Collected: 07/13/24 0230    Specimen: Blood from Arm, Left Updated: 07/13/24 0258     proBNP 940.1 pg/mL     Narrative:      This assay is used as an aid in the diagnosis of individuals suspected of having heart failure. It can be used as an aid in the diagnosis of acute decompensated heart failure (ADHF) in patients presenting with signs and symptoms of ADHF to the emergency department (ED). In addition,  NT-proBNP of <300 pg/mL indicates ADHF is not likely.    Age Range Result Interpretation  NT-proBNP Concentration (pg/mL:      <50             Positive            >450                   Gray                 300-450                    Negative             <300    50-75           Positive            >900                  Gray                300-900                  Negative            <300      >75             Positive            >1800                  Gray                300-1800                  Negative            <300    Magnesium [[id number]]  (Normal) Collected: 07/13/24 0230    Specimen: Blood from Arm, Left Updated: 07/13/24 0300     Magnesium 1.7 mg/dL     CBC Auto Differential [[id number]]  (Abnormal) Collected: 07/13/24 0230    Specimen: Blood from Arm, Left Updated: 07/13/24 0257     WBC 13.76 10*3/mm3      RBC 4.06 10*6/mm3      Hemoglobin 13.1 g/dL      Hematocrit 38.1 %      MCV 93.8 fL      MCH 32.3 pg      MCHC 34.4 g/dL      RDW 12.8 %      RDW-SD 44.3 fl      MPV 10.9 fL      Platelets 118 10*3/mm3      Neutrophil % 86.7 %      Lymphocyte % 5.6 %      Monocyte % 6.4 %      Eosinophil % 0.7 %      Basophil % 0.2 %      Immature Grans % 0.4 %      Neutrophils, Absolute 11.92 10*3/mm3      Lymphocytes, Absolute 0.77 10*3/mm3      Monocytes, Absolute 0.88 10*3/mm3      Eosinophils, Absolute 0.10 10*3/mm3      Basophils, Absolute 0.03 10*3/mm3      Immature Grans, Absolute 0.06 10*3/mm3      nRBC 0.0 /100 WBC     Scan Slide [[id number]] Collected: 07/13/24 0230    Specimen: Blood from Arm, Left Updated: 07/13/24 0257     Elliptocytes Slight/1+     Ovalocytes Slight/1+     Poikilocytes Slight/1+     WBC Morphology Normal     Platelet Morphology Normal    High Sensitivity Troponin T 2Hr [[id number]]  (Abnormal) Collected: 07/13/24 0435    Specimen: Blood from Arm, Left Updated: 07/13/24 0459     HS Troponin T 20 ng/L      Troponin T Delta 1 ng/L     Narrative:      High Sensitive Troponin T Reference  Range:  <14.0 ng/L- Negative Female for AMI  <22.0 ng/L- Negative Male for AMI  >=14 - Abnormal Female indicating possible myocardial injury.  >=22 - Abnormal Male indicating possible myocardial injury.   Clinicians would have to utilize clinical acumen, EKG, Troponin, and serial changes to determine if it is an Acute Myocardial Infarction or myocardial injury due to an underlying chronic condition.         Procalcitonin [[id number]] Collected: 07/13/24 0435    Specimen: Blood from Arm, Left Updated: 07/13/24 0812             Imaging:    XR Chest 1 View    Result Date: 7/13/2024  XR CHEST 1 VW-  Date of exam: 7/13/2024, 2:35 A.M.  Indication: Chest pain.  Comparison: 6/28/2024.  FINDINGS: A single AP (or PA) upright portable chest radiograph was performed. Mild cardiac enlargement is seen. No acute infiltrate is appreciated. No pleural effusion or pneumothorax is identified. The thoracic aorta is prominent, atherosclerotic, and ectatic. Chronic calcified granulomatous disease involves the chest. Degenerative changes involve the imaged spine and the bilateral shoulders. There are external artifacts. There may be mild chronic pleural-parenchymal scarring in the lung apices. No significant interval change is seen since the prior study (or studies).      No acute infiltrate is appreciated. There is suspected stable mild cardiomegaly.   Please note that portions of this note were completed with a voice recognition program.   Electronically Signed By-Jose Escalante MD On:7/13/2024 2:51 AM         Differential Diagnosis and Discussion:    Chest Pain:  Based on the patient's signs and symptoms, I considered aortic dissection, myocardial infaction, pulmonary embolism, cardiac tamponade, pericarditis, pneumothorax, musculoskeletal chest pain and other differential diagnosis as an etiology of the patient's chest pain.   Cough: Differential diagnosis includes but is not limited to pneumonia, acute bronchitis, upper respiratory  infection, ACE inhibitor use, allergic reaction, epiglottitis, seasonal allergies, chemical irritants, exercise-induced asthma, viral syndrome.  Fever: Based on the complaint of fever, differential diagnosis includes but is not limited to meningitis, pneumonia, pyelonephritis, acute uti,  systemic immune response syndrome, sepsis, viral syndrome, fungal infection, tick born illness and other bacterial infections.    All labs were reviewed and interpreted by me.  All X-rays impressions were independently interpreted by me.  EKG was interpreted by me.    MDM     Amount and/or Complexity of Data Reviewed  Clinical lab tests: reviewed  Tests in the radiology section of CPT®: reviewed  Tests in the medicine section of CPT®: reviewed  Decide to obtain previous medical records or to obtain history from someone other than the patient: yes           This patient is a pleasant 88-year-old female with history of hypertension and hyperlipidemia but no personal known coronary artery disease history, presenting with chest pain after she finished mowing the lawn with a riding lawnmower last night.    The pain is pleuritic in nature and she is also had frequent cough and congestion and already prescribed Z-William without relief.    She was noted to be hypoxic with sats of 80% per EMS and they started on a couple liters of nasal cannula oxygen and now she is at 90% on 2 L.    She thinks she may have had some fevers and chills and does feel warm to the touch on arrival and temperature of 99.8 Fahrenheit.    Her EKG that I reviewed shows no acute ischemia and troponins have been flat here and no signs of an actual acute MI although I considered possibility of ischemic chest pain.    I also considered PE versus pneumonia that was not seen on x-ray today.        I am initially giving her some IV ceftriaxone and azithromycin and sending off blood cultures and lactic acid for possible commune acquired pneumonia.    I am getting a CT of the  chest with contrast to rule out PE or infiltrate and I will plan to admit her to the hospital for further evaluation of her acute hypoxic respiratory failure and chest pain.                  Patient Care Considerations:          Consultants/Shared Management Plan:    Hospitalist: I have discussed the case with the admitting hospitalist who agrees to accept the patient for admission.    Social Determinants of Health:    Patient is independent, reliable, and has access to care.       Disposition and Care Coordination:    Admit:   Through independent evaluation of the patient's history, physical, and imperical data, the patient meets criteria for inpatient admission to the hospital.        Final diagnoses:   Pleuritic chest pain   Acute hypoxic respiratory failure        ED Disposition       ED Disposition   Decision to Admit    Condition   --    Comment   --               This medical record created using voice recognition software.             Colby Jain MD  07/13/24 5909

## 2024-07-13 NOTE — PLAN OF CARE
Goal Outcome Evaluation:  Plan of Care Reviewed With: patient        Progress: improving  Outcome Evaluation: ER admission this am. Pt was on 2 L NC, currently on room air sats at 93%. No oxygen use at home. Complaining of back and chest pain when taking deep breaths in. Will continue plan of care.

## 2024-07-13 NOTE — CASE MANAGEMENT/SOCIAL WORK
Discharge Planning Assessment   [place]     Patient Name: Kristine Rahman  MRN: [id number]  Today's Date: 7/13/2024    Admit Date: 7/13/2024        Discharge Needs Assessment       Row Name 07/13/24 0925       Living Environment    People in Home alone;grandchild(jorge)    Name(s) of People in Home Rosy, granddaughter, part of the time.    Current Living Arrangements home    Potentially Unsafe Housing Conditions none    In the past 12 months has the electric, gas, oil, or water company threatened to shut off services in your home? No    Primary Care Provided by self    Provides Primary Care For no one    Family Caregiver if Needed grandchild(jorge), adult;child(jorge), adult    Quality of Family Relationships involved;helpful    Able to Return to Prior Arrangements yes       Resource/Environmental Concerns    Transportation Concerns none       Transportation Needs    In the past 12 months, has lack of transportation kept you from medical appointments or from getting medications? no    In the past 12 months, has lack of transportation kept you from meetings, work, or from getting things needed for daily living? No       Food Insecurity    Within the past 12 months, you worried that your food would run out before you got the money to buy more. Never true    Within the past 12 months, the food you bought just didn't last and you didn't have money to get more. Never true       Transition Planning    Patient/Family Anticipates Transition to home;home with family    Patient/Family Anticipated Services at Transition home health care    Transportation Anticipated car, drives self;family or friend will provide       Discharge Needs Assessment    Readmission Within the Last 30 Days no previous admission in last 30 days    Equipment Currently Used at Home none    Concerns to be Addressed discharge planning    Anticipated Changes Related to Illness none    Equipment Needed After Discharge none    Discharge Facility/Level of Care  Needs home with home health                   Discharge Plan    No documentation.                 Continued Care and Services - Admitted Since 7/13/2024    No active coordination exists for this encounter.          Demographic Summary    No documentation.                  Functional Status    No documentation.                  Psychosocial    No documentation.                  Abuse/Neglect    No documentation.                  Legal    No documentation.                  Substance Abuse    No documentation.                  Patient Forms    No documentation.                     VICTOR HUGO James

## 2024-07-13 NOTE — ED NOTES
Patient presents to ED via [facility] from home after chest pain that radiates to the jaw. Patient mowed her grass today and began having chest pain @ 2030. Patient has a history of HTN, diverticulitis.     Per EMS patients o2 upon arrival to patients home was in the 80s. EMS administered o2 @ 2L which brought o2 up to high 90s. EMS administered 324 aspirin 2 Nitro en route.     Upon arrival to ED patient vitals stable, o2 @ 90 on RA.

## 2024-07-13 NOTE — H&P
[facility]   HOSPITALIST HISTORY AND PHYSICAL  Date: 2024   Patient Name: Kristine Rahman  : 1935  MRN: [id number]  Primary Care Physician:  Sameer Garsia DO  Date of admission: 2024    Subjective   Subjective     Chief Complaint: Shortness of breath    HPI:  Kristine Rahman is a 88 y.o. female with with essential hypertension and hyperlipidemia that presented to the ED with complaints of anterior chest pain that would radiate into her jaw and upper back that began yesterday evening after she finished mowing the lawn on a riding mower and persisted.  Patient noted that discomfort seem to worsen when taking deep breaths.  Patient also endorsed cough, congestion, fevers and chills that she had been dealing with for several days prior to onset of the symptoms.  Patient stated that she had been prescribed a Z-William but had not been helping her symptoms.  Patient when evaluated by EMS was noted to be hypoxic with SpO2 of 80%.  Patient was given aspirin and 2 sublingual nitroglycerin.  Eval in ED significant for labs showing leukocytosis.  Troponin flat.  EKG with no ischemic changes.  Patient placed on supplemental O2.  Patient given dose of azithromycin and ceftriaxone empirically for commune acquired pneumonia in the ED.  There is some concern for possible PE given patient's presentation, CT chest with contrast was obtained and no evidence of PE identified, 2.5 cm groundglass opacity left upper lobe was noted.  Hospitalist service contacted for further evaluation and management.  When discussing patient's care with ED physician I requested lactic acid level and procalcitonin be obtained.    Personal History     Past Medical History:  Essential hypertension  Hyperlipidemia  Anxiety/depression    Past Surgical History:  Cholecystectomy  Hemorrhoidectomy    Family History:   Mother: ; no history of heart disease, cancer or stroke  Father: ; history of lung disease    Social  History:   Tobacco: Denies use  Alcohol: Denies use  Illicit Substances: Denies use    Home Medications:  Calcium Carbonate-Vit D-Min, amLODIPine, amoxicillin-clavulanate, aspirin, azithromycin, benazepril, chlorthalidone, clopidogrel, estradiol, guaiFENesin, guaifenesin-dextromethorphan 600-30 mg, hydrALAZINE, metoprolol tartrate, pravastatin, and sertraline    Allergies:  Allergies   Allergen Reactions    Latex Unknown - Low Severity       Review of Systems  All systems were reviewed and negative except for:   -General ROS: positive for  - chills and fever  -Respiratory ROS: positive for - cough, shortness of breath, and sputum changes  -Cardiovascular ROS: positive for - chest pain and shortness of breath    Objective   Objective     Vitals:   Temp:  [98 °F (36.7 °C)-99.8 °F (37.7 °C)] 99.1 °F (37.3 °C)  Heart Rate:  [73-78] 76  Resp:  [18-20] 18  BP: (105-155)/() 105/87  Flow (L/min):  [2] 2    Physical Exam    Constitutional: Awake, alert, no acute distress, sitting up in chair   Eyes: Pupils equal, sclerae anicteric, no conjunctival injection   HENT: NCAT, mucous membranes moist   Neck: Supple, no thyromegaly, no lymphadenopathy, trachea midline   Respiratory: Decent aeration, equal chest rise bilaterally, normal respiratory effort, no wheezing noted   Cardiovascular: RRR, no murmurs, rubs, or gallops, palpable pedal pulses bilaterally   Gastrointestinal: Positive bowel sounds, soft, nontender, nondistended   Musculoskeletal: No bilateral ankle edema, no clubbing or cyanosis to extremities   Psychiatric: Appropriate affect, cooperative   Neurologic: Oriented x 3, strength symmetric in all extremities, Cranial Nerves grossly intact, speech clear   Skin: No rashes     Result Review    Result Review:  I have personally reviewed the results from the time of this admission to 7/13/2024 08:41 EDT and agree with these findings:  [x]  Laboratory:  CMP          2/2/2024    12:14 6/4/2024    12:04 7/13/2024     02:30   CMP   Glucose 94  94  148    BUN 23  33  29    Creatinine 0.97  0.98  0.92    EGFR 57.0  56.3  60.8    Sodium 141  141  140    Potassium 3.8  4.2  3.4    Chloride 102  105  105    Calcium 9.6  10.0  8.8    Total Protein 6.9  6.9  5.9    Albumin 4.1  4.2  3.8    Globulin 2.8  2.7  2.1    Total Bilirubin 0.5  0.5  0.5    Alkaline Phosphatase 90  98  78    AST (SGOT) 24  22  16    ALT (SGPT) 13  13  12    Albumin/Globulin Ratio 1.5  1.6  1.8    BUN/Creatinine Ratio 23.7  33.7  31.5    Anion Gap 11.4  11.4  10.1      CBC          7/13/2024    02:30   CBC   WBC 13.76    RBC 4.06    Hemoglobin 13.1    Hematocrit 38.1    MCV 93.8    MCH 32.3    MCHC 34.4    RDW 12.8    Platelets 118    Lipase: 15.  High sensitive troponin flat.  proBNP: 940.1.  []  Microbiology:  [x]  Radiology: CXR imaging personally reviewed.  No acute infiltrate noted.  Cardiomegaly noted.  CT chest with contrast personally reviewed.  No evidence of PE.  2.5 cm Groundglass opacity left upper lobe noted.  [x]  EKG/Telemetry:   []  Cardiology/Vascular:   []  Pathology:  []  Old records:  []  Other:      Assessment & Plan   Assessment / Plan     Assessment:  Concern for community-acquired pneumonia due to unknown infectious organism  Acute hypoxic respiratory failure secondary to above  Chest pain, appears to be pleuritic  Essential hypertension  2.5 cm groundglass opacity left upper lobe  Hypokalemia  Hyperlipidemia  Obesity (BMI: 31.88)  Anxiety/depression    Plan:  -Admit to remote telemetry bed  -Continue supplemental O2 to maintain sats greater than 90%, wean as tolerated  -Continue empiric antibiotics with azithromycin and ceftriaxone.  Tailor based on results of infectious workup.  -Trend procalcitonin  -Strep pneumo urinary antigen, Legionella urine antigen, sputum culture and respiratory viral panel ordered.  Follow-up results.  -Replace potassium  -Start IV fluids  -Repeat EKG  -Start appropriate home medications once medication list is  reconciled and reviewed  -2.5 cm groundglass opacity noted in left upper lobe.  This was discussed with patient.  Recommendations for patient have repeat imaging done in 3 months.  This was discussed with patient who voiced understanding.  -Monitor electrolytes and renal function with BMP and magnesium level in the AM  -Monitor WBC and Hgb with CBC in the AM  -Clinical course will dictate further management     DVT Prophylaxis: Lovenox  Diet: Cardiac  Dispo: Admit to remote telemetry bed  Code Status: Full code     Personally reviewed patients labs and imaging, discussed with patient and nurse at bedside. Discussed management with the ED physician (Dr. Colby Jain).     Part of this note may be an electronic transcription/translation of spoken language to printed text using the Dragon dictation system.      VTE Prophylaxis:  Pharmacologic VTE prophylaxis orders are signed & held.          CODE STATUS:    Code Status (Patient has no pulse and is not breathing): CPR (Attempt to Resuscitate)  Medical Interventions (Patient has pulse or is breathing): Full Support      Admission Status: I believe this patient meets inpatient status.    Electronically signed by Elgin Tabares MD, 07/13/24, 8:41 AM EDT.

## 2024-07-14 LAB
ANION GAP SERPL CALCULATED.3IONS-SCNC: 7 MMOL/L (ref 5–15)
BUN SERPL-MCNC: 16 MG/DL (ref 8–23)
BUN/CREAT SERPL: 27.6 (ref 7–25)
CALCIUM SPEC-SCNC: 8.9 MG/DL (ref 8.6–10.5)
CHLORIDE SERPL-SCNC: 106 MMOL/L (ref 98–107)
CO2 SERPL-SCNC: 27 MMOL/L (ref 22–29)
CREAT SERPL-MCNC: 0.58 MG/DL (ref 0.57–1)
DEPRECATED RDW RBC AUTO: 44.7 FL (ref 37–54)
EGFRCR SERPLBLD CKD-EPI 2021: 88.3 ML/MIN/1.73
ERYTHROCYTE [DISTWIDTH] IN BLOOD BY AUTOMATED COUNT: 12.7 % (ref 12.3–15.4)
GLUCOSE SERPL-MCNC: 103 MG/DL (ref 65–99)
HCT VFR BLD AUTO: 37.6 % (ref 34–46.6)
HGB BLD-MCNC: 12.7 G/DL (ref 12–15.9)
MAGNESIUM SERPL-MCNC: 1.8 MG/DL (ref 1.6–2.4)
MCH RBC QN AUTO: 32.2 PG (ref 26.6–33)
MCHC RBC AUTO-ENTMCNC: 33.8 G/DL (ref 31.5–35.7)
MCV RBC AUTO: 95.2 FL (ref 79–97)
PLATELET # BLD AUTO: 98 10*3/MM3 (ref 140–450)
PMV BLD AUTO: 12 FL (ref 6–12)
POTASSIUM SERPL-SCNC: 3.2 MMOL/L (ref 3.5–5.2)
PROCALCITONIN SERPL-MCNC: 0.18 NG/ML (ref 0–0.25)
QT INTERVAL: 330 MS
QT INTERVAL: 396 MS
QT INTERVAL: 402 MS
QTC INTERVAL: 429 MS
QTC INTERVAL: 436 MS
QTC INTERVAL: 448 MS
RBC # BLD AUTO: 3.95 10*6/MM3 (ref 3.77–5.28)
SODIUM SERPL-SCNC: 140 MMOL/L (ref 136–145)
WBC NRBC COR # BLD AUTO: 7.76 10*3/MM3 (ref 3.4–10.8)

## 2024-07-14 PROCEDURE — 25010000002 ENOXAPARIN PER 10 MG: Performed by: INTERNAL MEDICINE

## 2024-07-14 PROCEDURE — 85027 COMPLETE CBC AUTOMATED: CPT | Performed by: INTERNAL MEDICINE

## 2024-07-14 PROCEDURE — 25010000002 AZITHROMYCIN PER 500 MG: Performed by: INTERNAL MEDICINE

## 2024-07-14 PROCEDURE — 83735 ASSAY OF MAGNESIUM: CPT | Performed by: INTERNAL MEDICINE

## 2024-07-14 PROCEDURE — 25010000002 CEFTRIAXONE PER 250 MG: Performed by: INTERNAL MEDICINE

## 2024-07-14 PROCEDURE — 25810000003 SODIUM CHLORIDE 0.9 % SOLUTION: Performed by: INTERNAL MEDICINE

## 2024-07-14 PROCEDURE — 84145 PROCALCITONIN (PCT): CPT | Performed by: INTERNAL MEDICINE

## 2024-07-14 PROCEDURE — 99232 SBSQ HOSP IP/OBS MODERATE 35: CPT | Performed by: INTERNAL MEDICINE

## 2024-07-14 PROCEDURE — 80048 BASIC METABOLIC PNL TOTAL CA: CPT | Performed by: INTERNAL MEDICINE

## 2024-07-14 RX ORDER — METOPROLOL TARTRATE 50 MG/1
50 TABLET, FILM COATED ORAL 2 TIMES DAILY
Status: DISCONTINUED | OUTPATIENT
Start: 2024-07-14 | End: 2024-07-16 | Stop reason: HOSPADM

## 2024-07-14 RX ORDER — CLOPIDOGREL BISULFATE 75 MG/1
75 TABLET ORAL DAILY
Status: DISCONTINUED | OUTPATIENT
Start: 2024-07-14 | End: 2024-07-16 | Stop reason: HOSPADM

## 2024-07-14 RX ORDER — SERTRALINE HYDROCHLORIDE 100 MG/1
100 TABLET, FILM COATED ORAL DAILY
Status: DISCONTINUED | OUTPATIENT
Start: 2024-07-14 | End: 2024-07-16 | Stop reason: HOSPADM

## 2024-07-14 RX ORDER — POTASSIUM CHLORIDE 750 MG/1
40 CAPSULE, EXTENDED RELEASE ORAL EVERY 4 HOURS
Status: COMPLETED | OUTPATIENT
Start: 2024-07-14 | End: 2024-07-14

## 2024-07-14 RX ORDER — AMLODIPINE BESYLATE 10 MG/1
10 TABLET ORAL DAILY
Status: DISCONTINUED | OUTPATIENT
Start: 2024-07-14 | End: 2024-07-16 | Stop reason: HOSPADM

## 2024-07-14 RX ORDER — LISINOPRIL 20 MG/1
20 TABLET ORAL DAILY
Status: DISCONTINUED | OUTPATIENT
Start: 2024-07-14 | End: 2024-07-16 | Stop reason: HOSPADM

## 2024-07-14 RX ORDER — HYDRALAZINE HYDROCHLORIDE 50 MG/1
50 TABLET, FILM COATED ORAL 3 TIMES DAILY
Status: DISCONTINUED | OUTPATIENT
Start: 2024-07-14 | End: 2024-07-16 | Stop reason: HOSPADM

## 2024-07-14 RX ORDER — CHLORTHALIDONE 25 MG/1
25 TABLET ORAL DAILY
Status: DISCONTINUED | OUTPATIENT
Start: 2024-07-14 | End: 2024-07-16 | Stop reason: HOSPADM

## 2024-07-14 RX ORDER — PRAVASTATIN SODIUM 20 MG
20 TABLET ORAL NIGHTLY
Status: DISCONTINUED | OUTPATIENT
Start: 2024-07-14 | End: 2024-07-16 | Stop reason: HOSPADM

## 2024-07-14 RX ORDER — ASPIRIN 81 MG/1
81 TABLET, CHEWABLE ORAL DAILY
Status: DISCONTINUED | OUTPATIENT
Start: 2024-07-14 | End: 2024-07-14

## 2024-07-14 RX ADMIN — POTASSIUM CHLORIDE 40 MEQ: 750 CAPSULE, EXTENDED RELEASE ORAL at 11:36

## 2024-07-14 RX ADMIN — Medication 10 ML: at 20:44

## 2024-07-14 RX ADMIN — ENOXAPARIN SODIUM 40 MG: 100 INJECTION SUBCUTANEOUS at 11:36

## 2024-07-14 RX ADMIN — SERTRALINE HYDROCHLORIDE 100 MG: 100 TABLET ORAL at 08:11

## 2024-07-14 RX ADMIN — APIXABAN 5 MG: 5 TABLET, FILM COATED ORAL at 20:43

## 2024-07-14 RX ADMIN — AMLODIPINE BESYLATE 10 MG: 10 TABLET ORAL at 08:11

## 2024-07-14 RX ADMIN — PRAVASTATIN SODIUM 20 MG: 20 TABLET ORAL at 20:43

## 2024-07-14 RX ADMIN — METOPROLOL TARTRATE 50 MG: 50 TABLET, FILM COATED ORAL at 08:11

## 2024-07-14 RX ADMIN — APIXABAN 5 MG: 5 TABLET, FILM COATED ORAL at 12:37

## 2024-07-14 RX ADMIN — LISINOPRIL 20 MG: 20 TABLET ORAL at 08:11

## 2024-07-14 RX ADMIN — POTASSIUM CHLORIDE 40 MEQ: 750 CAPSULE, EXTENDED RELEASE ORAL at 08:10

## 2024-07-14 RX ADMIN — HYDRALAZINE HYDROCHLORIDE 50 MG: 50 TABLET ORAL at 20:43

## 2024-07-14 RX ADMIN — AZITHROMYCIN MONOHYDRATE 500 MG: 500 INJECTION, POWDER, LYOPHILIZED, FOR SOLUTION INTRAVENOUS at 09:17

## 2024-07-14 RX ADMIN — CEFTRIAXONE SODIUM 1000 MG: 1 INJECTION, POWDER, FOR SOLUTION INTRAMUSCULAR; INTRAVENOUS at 08:10

## 2024-07-14 RX ADMIN — CHLORTHALIDONE 25 MG: 25 TABLET ORAL at 08:11

## 2024-07-14 RX ADMIN — HYDRALAZINE HYDROCHLORIDE 50 MG: 50 TABLET ORAL at 08:10

## 2024-07-14 RX ADMIN — Medication 10 ML: at 08:10

## 2024-07-14 RX ADMIN — CLOPIDOGREL BISULFATE 75 MG: 75 TABLET ORAL at 08:11

## 2024-07-14 RX ADMIN — HYDRALAZINE HYDROCHLORIDE 50 MG: 50 TABLET ORAL at 15:21

## 2024-07-14 RX ADMIN — ASPIRIN 81 MG 81 MG: 81 TABLET ORAL at 08:11

## 2024-07-14 NOTE — PLAN OF CARE
Goal Outcome Evaluation:  Plan of Care Reviewed With: patient, family           Outcome Evaluation: patient denies chest pain this shift. ambulating to toilet with standby assist. showered tonight. only slightly PAPA and recoops quickly. systolic bp trending up, though diastolic trends a bit on the lower side. home meds have not been reordered yet (patient is on multiple antihypertensives). pt slept well and is hoping to dc home today.

## 2024-07-14 NOTE — PROGRESS NOTES
[facility]   Hospitalist Progress Note  Date: 2024  Patient Name: Kristine Rahman  : 1935  MRN: [id number]  Date of admission: 2024  Consultants:   -Cardiology: Dr. Ivana Bowen    Subjective   Subjective     Chief Complaint: Shortness of breath    Summary:   Kristine Rahman is a 88 y.o. female with essential hypertension and hyperlipidemia that presented to the ED with complaints of anterior chest pain that would radiate into her jaw and upper back that began yesterday evening after she finished mowing the lawn on a riding mower and persisted. Eval in ED significant for labs showing leukocytosis. Troponin flat. EKG with no ischemic changes. CT chest with contrast was obtained and no evidence of PE identified, 2.5 cm groundglass opacity left upper lobe was noted.  Empiric antibiotics started.  Chest pain resolved.  Patient will need to have repeat CT chest done in 3 months due to 2.5 cm granulous opacity in left upper lobe.  Hospitalization complicated by development of paroxysmal atrial fibrillation (new diagnosis).  Patient's cardiologist consulted to assist in care.    Interval Followup:   Patient did have episodes of paroxysmal atrial fibrillation overnight.  She stated that her chest pain had resolved.  Leukocytosis improved.  She stated that she was feeling better overall.  Nursing with no additional acute issues to report.    Antibiotics:   -Azithromycin  -Ceftriaxone    Objective   Objective     Vitals:   Temp:  [97.2 °F (36.2 °C)-98.2 °F (36.8 °C)] 98.2 °F (36.8 °C)  Heart Rate:  [] 112  Resp:  [14-16] 16  BP: (133-174)/(45-74) 133/74  Flow (L/min):  [1-2] 1  Physical Exam   Gen: No acute distress, Conversant, Pleasant, sitting up in chair at bedside  Resp: CTAB, No w/r/r, No respiratory distress appreciated  Card: RRR, No m/r/g  Abd: Soft, Nontender, Nondistended, + bowel sounds    Result Review    Result Review:  I have personally reviewed the results as below and agree with  these findings:  []  Laboratory:   CMP          6/4/2024    12:04 7/13/2024    02:30 7/14/2024    05:31   CMP   Glucose 94  148  103    BUN 33  29  16    Creatinine 0.98  0.92  0.58    EGFR 56.3  60.8  88.3    Sodium 141  140  140    Potassium 4.2  3.4  3.2    Chloride 105  105  106    Calcium 10.0  8.8  8.9    Total Protein 6.9  5.9     Albumin 4.2  3.8     Globulin 2.7  2.1     Total Bilirubin 0.5  0.5     Alkaline Phosphatase 98  78     AST (SGOT) 22  16     ALT (SGPT) 13  12     Albumin/Globulin Ratio 1.6  1.8     BUN/Creatinine Ratio 33.7  31.5  27.6    Anion Gap 11.4  10.1  7.0      CBC          7/13/2024    02:30 7/14/2024    05:31   CBC   WBC 13.76  7.76    RBC 4.06  3.95    Hemoglobin 13.1  12.7    Hematocrit 38.1  37.6    MCV 93.8  95.2    MCH 32.3  32.2    MCHC 34.4  33.8    RDW 12.8  12.7    Platelets 118  98    Magnesium within normal limits.  Procalcitonin: 0.18.  [x]  Microbiology: Blood culture (07/13/2024): No growth to date  []  Radiology:   [x]  EKG/Telemetry:    []  Cardiology/Vascular:    []  Pathology:  []  Old records:  []  Other:    Assessment & Plan   Assessment / Plan     Assessment:  Paroxysmal atrial fibrillation, new diagnosis  Community acquired pneumonia due to unknown infectious organism  Acute hypoxic respiratory failure secondary to above  Chest pain, likely pleuritic  Essential hypertension  2.5 cm groundglass opacity left upper lobe  Hypokalemia  Hyperlipidemia  Obesity (BMI: 31.88)  Anxiety/depression    Plan:  -Continue azithromycin and ceftriaxone for treatment of community-acquired pneumonia  -Replace potassium  -Start apixaban.  Patient with new diagnosis of paroxysmal atrial fibrillation  -Patient's cardiologist consulted to assist in care of the patient given new diagnosis of paroxysmal atrial fibrillation  -Continue supplemental O2 to maintain sats greater than 90%, wean as tolerated  -Start appropriate home medications  -Encouraged to increase ambulation and use of  incentive spirometer throughout the day.  -Will monitor electrolytes and renal function with BMP and magnesium level in the AM  -Will monitor WBC and Hgb with CBC in the AM  -Clinical course will dictate further management     DVT Prophylaxis: Apixaban  Diet:   Diet Order   Procedures    Diet: Cardiac; Healthy Heart (2-3 Na+); Fluid Consistency: Thin (IDDSI 0)     Dispo: Home when medically appropriate for discharge     Personally reviewed patients labs and imaging, discussed with patient and nurse at bedside. Discussed management with the following consultants: Cardiology.     Part of this note may be an electronic transcription/translation of spoken language to printed text using the Dragon dictation system.    VTE Prophylaxis:  Pharmacologic VTE prophylaxis orders are present.        CODE STATUS:   Code Status (Patient has no pulse and is not breathing): CPR (Attempt to Resuscitate)  Medical Interventions (Patient has pulse or is breathing): Full Support        Electronically signed by Elgin Tabares MD, 7/14/2024, 11:42 EDT.

## 2024-07-15 LAB
ANION GAP SERPL CALCULATED.3IONS-SCNC: 6.6 MMOL/L (ref 5–15)
BACTERIA SPEC RESP CULT: NORMAL
BUN SERPL-MCNC: 14 MG/DL (ref 8–23)
BUN/CREAT SERPL: 18.4 (ref 7–25)
CALCIUM SPEC-SCNC: 8.8 MG/DL (ref 8.6–10.5)
CHLORIDE SERPL-SCNC: 106 MMOL/L (ref 98–107)
CO2 SERPL-SCNC: 25.4 MMOL/L (ref 22–29)
CREAT SERPL-MCNC: 0.76 MG/DL (ref 0.57–1)
DEPRECATED RDW RBC AUTO: 45.8 FL (ref 37–54)
EGFRCR SERPLBLD CKD-EPI 2021: 76.4 ML/MIN/1.73
ERYTHROCYTE [DISTWIDTH] IN BLOOD BY AUTOMATED COUNT: 13 % (ref 12.3–15.4)
GLUCOSE SERPL-MCNC: 91 MG/DL (ref 65–99)
GRAM STN SPEC: NORMAL
GRAM STN SPEC: NORMAL
HCT VFR BLD AUTO: 39.8 % (ref 34–46.6)
HGB BLD-MCNC: 13 G/DL (ref 12–15.9)
MAGNESIUM SERPL-MCNC: 1.6 MG/DL (ref 1.6–2.4)
MCH RBC QN AUTO: 31.6 PG (ref 26.6–33)
MCHC RBC AUTO-ENTMCNC: 32.7 G/DL (ref 31.5–35.7)
MCV RBC AUTO: 96.6 FL (ref 79–97)
PLATELET # BLD AUTO: 114 10*3/MM3 (ref 140–450)
PMV BLD AUTO: 11.7 FL (ref 6–12)
POTASSIUM SERPL-SCNC: 3.8 MMOL/L (ref 3.5–5.2)
RBC # BLD AUTO: 4.12 10*6/MM3 (ref 3.77–5.28)
SODIUM SERPL-SCNC: 138 MMOL/L (ref 136–145)
WBC NRBC COR # BLD AUTO: 7 10*3/MM3 (ref 3.4–10.8)

## 2024-07-15 PROCEDURE — 25010000002 MAGNESIUM SULFATE 2 GM/50ML SOLUTION: Performed by: INTERNAL MEDICINE

## 2024-07-15 PROCEDURE — 85027 COMPLETE CBC AUTOMATED: CPT | Performed by: INTERNAL MEDICINE

## 2024-07-15 PROCEDURE — 25810000003 SODIUM CHLORIDE 0.9 % SOLUTION: Performed by: INTERNAL MEDICINE

## 2024-07-15 PROCEDURE — 25010000002 AZITHROMYCIN PER 500 MG: Performed by: INTERNAL MEDICINE

## 2024-07-15 PROCEDURE — 83735 ASSAY OF MAGNESIUM: CPT | Performed by: INTERNAL MEDICINE

## 2024-07-15 PROCEDURE — 80048 BASIC METABOLIC PNL TOTAL CA: CPT | Performed by: INTERNAL MEDICINE

## 2024-07-15 PROCEDURE — 99232 SBSQ HOSP IP/OBS MODERATE 35: CPT | Performed by: INTERNAL MEDICINE

## 2024-07-15 PROCEDURE — 25010000002 CEFTRIAXONE PER 250 MG: Performed by: INTERNAL MEDICINE

## 2024-07-15 RX ORDER — MAGNESIUM SULFATE HEPTAHYDRATE 40 MG/ML
2 INJECTION, SOLUTION INTRAVENOUS ONCE
Status: COMPLETED | OUTPATIENT
Start: 2024-07-15 | End: 2024-07-16

## 2024-07-15 RX ADMIN — Medication 10 ML: at 21:46

## 2024-07-15 RX ADMIN — PRAVASTATIN SODIUM 20 MG: 20 TABLET ORAL at 21:45

## 2024-07-15 RX ADMIN — Medication 10 ML: at 08:23

## 2024-07-15 RX ADMIN — METOPROLOL TARTRATE 50 MG: 50 TABLET, FILM COATED ORAL at 08:22

## 2024-07-15 RX ADMIN — CEFTRIAXONE SODIUM 1000 MG: 1 INJECTION, POWDER, FOR SOLUTION INTRAMUSCULAR; INTRAVENOUS at 08:24

## 2024-07-15 RX ADMIN — AZITHROMYCIN MONOHYDRATE 500 MG: 500 INJECTION, POWDER, LYOPHILIZED, FOR SOLUTION INTRAVENOUS at 10:56

## 2024-07-15 RX ADMIN — APIXABAN 5 MG: 5 TABLET, FILM COATED ORAL at 21:45

## 2024-07-15 RX ADMIN — MAGNESIUM SULFATE HEPTAHYDRATE 2 G: 40 INJECTION, SOLUTION INTRAVENOUS at 10:56

## 2024-07-15 RX ADMIN — CHLORTHALIDONE 25 MG: 25 TABLET ORAL at 08:23

## 2024-07-15 RX ADMIN — AMLODIPINE BESYLATE 10 MG: 10 TABLET ORAL at 08:21

## 2024-07-15 RX ADMIN — METOPROLOL TARTRATE 50 MG: 50 TABLET, FILM COATED ORAL at 21:45

## 2024-07-15 RX ADMIN — SERTRALINE HYDROCHLORIDE 100 MG: 100 TABLET ORAL at 08:20

## 2024-07-15 RX ADMIN — CLOPIDOGREL BISULFATE 75 MG: 75 TABLET ORAL at 08:20

## 2024-07-15 RX ADMIN — APIXABAN 5 MG: 5 TABLET, FILM COATED ORAL at 08:20

## 2024-07-15 RX ADMIN — HYDRALAZINE HYDROCHLORIDE 50 MG: 50 TABLET ORAL at 08:22

## 2024-07-15 RX ADMIN — LISINOPRIL 20 MG: 20 TABLET ORAL at 08:22

## 2024-07-15 NOTE — PLAN OF CARE
Goal Outcome Evaluation:  Plan of Care Reviewed With: patient        Progress: no change  Outcome Evaluation: Onset of A.fib today, MD aware, medications ordered and cardiology consulted. No reports of chest pain or nausea today. Pt converted to sinus rythm this evening. Possible discharge tomorrow if stable. Continue plan of care.

## 2024-07-15 NOTE — CONSULTS
Date of consultation: 7/15/2024    Reason for consultation: Atrial fibrillation    HPI: An 88-year-old female presented to the ER with mid left-sided chest pain that got worse after taking deep breaths and cough.  It radiated to the lower jaw and upper back.  It started after mowing the lawn on a riding mower.  She has no dyspnea, orthopnea, palpitations, syncope or near syncope.  She was recently treated with oral antibiotic for cough and chest congestion.    After being admitted, she was found to be in atrial fibrillation on telemetry.  She was started on a beta-blocker and Eliquis.  She converted into sinus rhythm.    Review of systems: Negative for headache, tinnitus, vertigo, nausea, vomiting, abdominal pain, fever, chills, TIA or CVA-like symptoms    Past medical and surgical history:    1.  Hypertension  2.  Hypercholesterolemia  3.  Runs disease  4.  Anxiety and depression  5.  Surgeries and procedures: Cholecystectomy, hemorrhoidectomy and colonoscopy    Medications: See the patient's medication list    Allergies: Latex.    Social history: She is non-smoker.  No alcohol or illicit drug use    Family history: Mother  of heart disease (specifics are unknown).    Physical examination: She was in no pain or respiratory distress.  Vital signs: Reviewed  HEENT: Sclerae: Nonicteric.  EOMI  Neck: No JVD or carotid bruit  Chest: CTA.  No wheeze or rales  Cardiac: RRR.  No S3 gallop.  Abdomen: Soft and nontender.  No megalies or masses  Extremities: No edema, cyanosis or clubbing.  Pulse intact.  Varicose and spider veins were noted bilaterally  Neuro: Alert, oriented x 3, no facial droop and speech was clear    Records: Reviewed    Assessment and plan:    1.  Atrial fibrillation RVR, converted into sinus rhythm  2.  Oral anticoagulation with Eliquis  3.  Community-acquired pneumonia of the left upper lobe and pleuritic type chest pain.  4.  Hypokalemia, given KCl  5.  Cardiac: Stable.  Continue the same current  cardiac medications.  6.  Any further management will be based on her clinical course.  7.  Dr. Wilson will cover me in my absence in the next 3 days and he will follow this case as needed.  8.  I will see the patient in office within 1 week after her discharge.

## 2024-07-15 NOTE — PLAN OF CARE
Goal Outcome Evaluation:  Plan of Care Reviewed With: patient        Progress: improving  Outcome Evaluation: patient c/o diarrhea, does not meet criteria for c.diff testing. no other complaints. See discharge care plans for further details.

## 2024-07-15 NOTE — PLAN OF CARE
Goal Outcome Evaluation:  Plan of Care Reviewed With: patient           Outcome Evaluation: Pt has had no c/o this shift.

## 2024-07-15 NOTE — PROGRESS NOTES
[facility]   Hospitalist Progress Note  Date: 7/15/2024  Patient Name: Kristine Rahman  : 1935  MRN: [id number]  Date of admission: 2024  Consultants:   -Cardiology: Dr. Ivana Bowen    Subjective   Subjective     Chief Complaint: Shortness of breath    Summary:   Kristine Rahman is a 88 y.o. female with essential hypertension and hyperlipidemia that presented to the ED with complaints of anterior chest pain that would radiate into her jaw and upper back that began yesterday evening after she finished mowing the lawn on a riding mower and persisted. Eval in ED significant for labs showing leukocytosis. Troponin flat. EKG with no ischemic changes. CT chest with contrast was obtained and no evidence of PE identified, 2.5 cm groundglass opacity left upper lobe was noted.  Empiric antibiotics started.  Chest pain resolved.  Patient will need to have repeat CT chest done in 3 months due to 2.5 cm granulous opacity in left upper lobe.  Hospitalization complicated by development of paroxysmal atrial fibrillation (new diagnosis).  Patient's cardiologist consulted to assist in care.    Interval Followup:   No acute events overnight.  Rate better controlled controlled on telemetry reviewed.  Patient without complaints of chest pain.  Denies any shortness of breath.  Patient ambulating.  Nursing with no additional acute issues to report.    Antibiotics:   -Azithromycin  -Ceftriaxone    Objective   Objective     Vitals:   Temp:  [98.1 °F (36.7 °C)-99 °F (37.2 °C)] 98.2 °F (36.8 °C)  Heart Rate:  [] 65  Resp:  [16] 16  BP: (113-168)/(49-64) 113/49  Physical Exam   Gen: No acute distress, ambulating, pleasant, conversant  Resp: CTAB, No w/r/r, good aeration, equal chest rise bilaterally  Card: RRR, No m/r/g  Abd: Soft, Nontender, Nondistended, + bowel sounds    Result Review    Result Review:  I have personally reviewed the results as below and agree with these findings:  []  Laboratory:   CMP           7/13/2024    02:30 7/14/2024    05:31 7/15/2024    05:55   CMP   Glucose 148  103  91    BUN 29  16  14    Creatinine 0.92  0.58  0.76    EGFR 60.8  88.3  76.4    Sodium 140  140  138    Potassium 3.4  3.2  3.8    Chloride 105  106  106    Calcium 8.8  8.9  8.8    Total Protein 5.9      Albumin 3.8      Globulin 2.1      Total Bilirubin 0.5      Alkaline Phosphatase 78      AST (SGOT) 16      ALT (SGPT) 12      Albumin/Globulin Ratio 1.8      BUN/Creatinine Ratio 31.5  27.6  18.4    Anion Gap 10.1  7.0  6.6      CBC          7/13/2024    02:30 7/14/2024    05:31 7/15/2024    05:55   CBC   WBC 13.76  7.76  7.00    RBC 4.06  3.95  4.12    Hemoglobin 13.1  12.7  13.0    Hematocrit 38.1  37.6  39.8    MCV 93.8  95.2  96.6    MCH 32.3  32.2  31.6    MCHC 34.4  33.8  32.7    RDW 12.8  12.7  13.0    Platelets 118  98  114    Magnesium low.  [x]  Microbiology: Blood culture (07/13/2024): No growth to date  []  Radiology:   [x]  EKG/Telemetry:    []  Cardiology/Vascular:    []  Pathology:  []  Old records:  []  Other:    Assessment & Plan   Assessment / Plan     Assessment:  Paroxysmal atrial fibrillation, new diagnosis  Community acquired pneumonia due to unknown infectious organism  Acute hypoxic respiratory failure secondary to above  Chest pain, likely pleuritic  Hypomagnesemia  Essential hypertension  2.5 cm groundglass opacity left upper lobe  Hypokalemia  Hyperlipidemia  Obesity (BMI: 31.88)  Anxiety/depression    Plan:  -Continue azithromycin and ceftriaxone for treatment of community-acquired pneumonia  -Replace magnesium  -Continue apixaban  -Patient's cardiologist consulted to assist in care of the patient given new diagnosis of paroxysmal atrial fibrillation  -Continue supplemental O2 to maintain sats greater than 90%, wean as tolerated  -Continue appropriate home medications  -Encouraged to increase ambulation and use of incentive spirometer throughout the day.  -Monitor electrolytes and renal function with BMP  and magnesium level in the AM  -Monitor WBC and Hgb with CBC in the AM  -Clinical course will dictate further management     DVT Prophylaxis: Apixaban  Diet:   Diet Order   Procedures    Diet: Cardiac; Healthy Heart (2-3 Na+); Fluid Consistency: Thin (IDDSI 0)     Dispo: Home when medically appropriate for discharge     Personally reviewed patients labs and imaging, discussed with patient and nurse at bedside. Discussed management with the following consultants: Cardiology.     Part of this note may be an electronic transcription/translation of spoken language to printed text using the Dragon dictation system.    VTE Prophylaxis:  Pharmacologic VTE prophylaxis orders are present.        CODE STATUS:   Code Status (Patient has no pulse and is not breathing): CPR (Attempt to Resuscitate)  Medical Interventions (Patient has pulse or is breathing): Full Support        Electronically signed by Elgin Tabares MD, 7/15/2024, 12:09 EDT.

## 2024-07-16 ENCOUNTER — READMISSION MANAGEMENT (OUTPATIENT)
Dept: CALL CENTER | Facility: HOSPITAL | Age: 89
End: 2024-07-16
Payer: MEDICARE

## 2024-07-16 VITALS
RESPIRATION RATE: 18 BRPM | BODY MASS INDEX: 29.9 KG/M2 | HEIGHT: 62 IN | TEMPERATURE: 98.1 F | DIASTOLIC BLOOD PRESSURE: 53 MMHG | HEART RATE: 69 BPM | OXYGEN SATURATION: 92 % | SYSTOLIC BLOOD PRESSURE: 161 MMHG | WEIGHT: 162.48 LBS

## 2024-07-16 LAB
ANION GAP SERPL CALCULATED.3IONS-SCNC: 6.7 MMOL/L (ref 5–15)
BUN SERPL-MCNC: 15 MG/DL (ref 8–23)
BUN/CREAT SERPL: 22.1 (ref 7–25)
CALCIUM SPEC-SCNC: 9.2 MG/DL (ref 8.6–10.5)
CHLORIDE SERPL-SCNC: 107 MMOL/L (ref 98–107)
CO2 SERPL-SCNC: 27.3 MMOL/L (ref 22–29)
CREAT SERPL-MCNC: 0.68 MG/DL (ref 0.57–1)
DEPRECATED RDW RBC AUTO: 44.6 FL (ref 37–54)
EGFRCR SERPLBLD CKD-EPI 2021: 84.9 ML/MIN/1.73
ERYTHROCYTE [DISTWIDTH] IN BLOOD BY AUTOMATED COUNT: 12.7 % (ref 12.3–15.4)
GLUCOSE SERPL-MCNC: 98 MG/DL (ref 65–99)
HCT VFR BLD AUTO: 39.9 % (ref 34–46.6)
HGB BLD-MCNC: 13 G/DL (ref 12–15.9)
MAGNESIUM SERPL-MCNC: 2 MG/DL (ref 1.6–2.4)
MCH RBC QN AUTO: 31.2 PG (ref 26.6–33)
MCHC RBC AUTO-ENTMCNC: 32.6 G/DL (ref 31.5–35.7)
MCV RBC AUTO: 95.7 FL (ref 79–97)
PLATELET # BLD AUTO: 149 10*3/MM3 (ref 140–450)
PMV BLD AUTO: 11.5 FL (ref 6–12)
POTASSIUM SERPL-SCNC: 4.1 MMOL/L (ref 3.5–5.2)
RBC # BLD AUTO: 4.17 10*6/MM3 (ref 3.77–5.28)
SODIUM SERPL-SCNC: 141 MMOL/L (ref 136–145)
WBC NRBC COR # BLD AUTO: 6.15 10*3/MM3 (ref 3.4–10.8)

## 2024-07-16 PROCEDURE — 80048 BASIC METABOLIC PNL TOTAL CA: CPT | Performed by: INTERNAL MEDICINE

## 2024-07-16 PROCEDURE — 99239 HOSP IP/OBS DSCHRG MGMT >30: CPT | Performed by: INTERNAL MEDICINE

## 2024-07-16 PROCEDURE — 85027 COMPLETE CBC AUTOMATED: CPT | Performed by: INTERNAL MEDICINE

## 2024-07-16 PROCEDURE — 25010000002 CEFTRIAXONE PER 250 MG: Performed by: INTERNAL MEDICINE

## 2024-07-16 PROCEDURE — 83735 ASSAY OF MAGNESIUM: CPT | Performed by: INTERNAL MEDICINE

## 2024-07-16 RX ORDER — AMOXICILLIN AND CLAVULANATE POTASSIUM 875; 125 MG/1; MG/1
1 TABLET, FILM COATED ORAL 2 TIMES DAILY
Qty: 2 TABLET | Refills: 0 | Status: SHIPPED | OUTPATIENT
Start: 2024-07-17 | End: 2024-07-18

## 2024-07-16 RX ADMIN — METOPROLOL TARTRATE 50 MG: 50 TABLET, FILM COATED ORAL at 08:42

## 2024-07-16 RX ADMIN — CLOPIDOGREL BISULFATE 75 MG: 75 TABLET ORAL at 08:43

## 2024-07-16 RX ADMIN — Medication 10 ML: at 08:44

## 2024-07-16 RX ADMIN — APIXABAN 5 MG: 5 TABLET, FILM COATED ORAL at 08:42

## 2024-07-16 RX ADMIN — CHLORTHALIDONE 25 MG: 25 TABLET ORAL at 08:42

## 2024-07-16 RX ADMIN — CEFTRIAXONE SODIUM 1000 MG: 1 INJECTION, POWDER, FOR SOLUTION INTRAMUSCULAR; INTRAVENOUS at 08:43

## 2024-07-16 RX ADMIN — LISINOPRIL 20 MG: 20 TABLET ORAL at 08:43

## 2024-07-16 RX ADMIN — HYDRALAZINE HYDROCHLORIDE 50 MG: 50 TABLET ORAL at 08:43

## 2024-07-16 RX ADMIN — AMLODIPINE BESYLATE 10 MG: 10 TABLET ORAL at 08:43

## 2024-07-16 RX ADMIN — SERTRALINE HYDROCHLORIDE 100 MG: 100 TABLET ORAL at 08:42

## 2024-07-16 NOTE — SIGNIFICANT NOTE
07/16/24 1130   Coping/Psychosocial   Observed Emotional State calm;cooperative   Verbalized Emotional State relief;hopefulness   Trust Relationship/Rapport empathic listening provided   Family/Support Persons family;friend   Involvement in Care interacting with patient   Additional Documentation Spiritual Care (Group)   Spiritual Care   Use of Spiritual Resources non-Taoism use of spiritual care   Spiritual Care Source  initiative   Spiritual Care Follow-Up follow-up, none required as presently assessed   Response to Spiritual Care receptive of support;thanks expressed   Spiritual Care Interventions supportive conversation provided   Spiritual Care Visit Type initial   Receptivity to Spiritual Care visit welcomed

## 2024-07-16 NOTE — OUTREACH NOTE
Prep Survey      Flowsheet Row Responses   [facility] facility patient discharged from? [facility]   Is LACE score < 7 ? No   Eligibility [facility]   Date of Admission 07/13/24   Date of Discharge 07/16/24   Discharge Disposition Home or Self Care   Discharge diagnosis Acute hypoxic resp failure   Does the patient have one of the following disease processes/diagnoses(primary or secondary)? Other   Does the patient have Home health ordered? No   Is there a DME ordered? No   Prep survey completed? Yes            BURT A - Registered Nurse

## 2024-07-16 NOTE — PLAN OF CARE
Goal Outcome Evaluation:  Plan of Care Reviewed With: patient        Progress: improving  Outcome Evaluation: no c/o diarrhea this shift  . pt has rested well

## 2024-07-16 NOTE — DISCHARGE SUMMARY
[facility]         HOSPITALIST  DISCHARGE SUMMARY    Patient Name: Kristine Rahman  : 1935  MRN: [id number]    Date of Admission: 2024  Date of Discharge:  24  Primary Care Physician: Sameer Garsia DO    Consultants:  -Cardiology: Dr. Heath [facility] Problems:  Paroxysmal atrial fibrillation, new diagnosis  Community acquired pneumonia due to unknown infectious organism  Acute hypoxic respiratory failure secondary to above  Chest pain, likely pleuritic  Hypomagnesemia  Essential hypertension  2.5 cm groundglass opacity left upper lobe  Hypokalemia  Hyperlipidemia  Obesity (BMI: 31.88)  Anxiety/depression    Hospital Course     Hospital Course:  Kristine Rahman is a 88 y.o. female with essential hypertension and hyperlipidemia that presented to the ED with complaints of anterior chest pain that would radiate into her jaw and upper back that began yesterday evening after she finished mowing the lawn on a riding mower and persisted. Eval in ED significant for labs showing leukocytosis. Troponin flat. EKG with no ischemic changes. CT chest with contrast was obtained and no evidence of PE identified, 2.5 cm groundglass opacity left upper lobe was noted. Empiric antibiotics started. Chest pain resolved. Patient will need to have repeat CT chest done in 3 months due to 2.5 cm granulous opacity in left upper lobe. Hospitalization complicated by development of paroxysmal atrial fibrillation (new diagnosis). Patient's cardiologist consulted to assist in care, outpatient follow-up recommended.  Patient will discharge on apixaban.  Patient will complete antibiotic treatment with Augmentin for community-acquired pneumonia after discharge.  On day of discharge patient hemodynamically stable and no additional inpatient evaluation or workup necessary at this time, patient discharged home with outpatient follow-up.    DISCHARGE Follow Up Recommendations for labs and  diagnostics:   -Follow-up with PCP in 3 to 5 days  -Follow-up with cardiology in 1 week    Day of Discharge     Vital Signs:  Temp:  [97.5 °F (36.4 °C)-98.2 °F (36.8 °C)] 98.1 °F (36.7 °C)  Heart Rate:  [60-69] 69  Resp:  [16-18] 18  BP: (113-161)/(49-63) 161/53  Physical Exam:   Gen: No acute distress, sitting up in chair bedside, conversant, pleasant  Resp: CTAB, No w/r/r, normal respiratory effort  Card: RRR, No m/r/g  Abd: Soft, Nontender, Nondistended, + bowel sounds     Discharge Details        Discharge Medications        New Medications        Instructions Start Date   apixaban 5 MG tablet tablet  Commonly known as: ELIQUIS   5 mg, Oral, 2 Times Daily             Changes to Medications        Instructions Start Date   estradiol 0.1 MG/GM vaginal cream  Commonly known as: ESTRACE  What changed: See the new instructions.   INSERT 2 GRAMS VAGINALLY 3 TIMES A WEEK      metoprolol tartrate 50 MG tablet  Commonly known as: LOPRESSOR  What changed: See the new instructions.   TAKE 1 TABLET TWICE A DAY  (NEW DOSAGE)             Continue These Medications        Instructions Start Date   amLODIPine 10 MG tablet  Commonly known as: NORVASC   10 mg, Oral, Daily      amoxicillin-clavulanate 875-125 MG per tablet  Commonly known as: AUGMENTIN   1 tablet, Oral, 2 Times Daily   Start Date: July 17, 2024     benazepril 40 MG tablet  Commonly known as: LOTENSIN   40 mg, Oral, Daily      CALCIUM 1200 PO   1,200 mg, Oral, Daily      chlorthalidone 25 MG tablet  Commonly known as: HYGROTON   25 mg, Oral, Daily      clopidogrel 75 MG tablet  Commonly known as: PLAVIX   75 mg, Oral, Daily      guaifenesin-dextromethorphan 600-30 mg  MG tablet sustained-release 12 hour   2 tablets, Oral, 2 Times Daily PRN      hydrALAZINE 50 MG tablet  Commonly known as: APRESOLINE   50 mg, Oral, 3 Times Daily      multivitamin with minerals tablet tablet   1 tablet, Oral, Daily      pravastatin 20 MG tablet  Commonly known as: PRAVACHOL    20 mg, Oral, Nightly      sertraline 100 MG tablet  Commonly known as: ZOLOFT   100 mg, Oral, Daily             Stop These Medications      aspirin 81 MG chewable tablet     guaiFENesin 600 MG 12 hr tablet  Commonly known as: Mucinex              Allergies   Allergen Reactions   • Latex Unknown - Low Severity       Discharge Disposition:      Diet:  Hospital:  Diet Order   Procedures   • Diet: Cardiac; Healthy Heart (2-3 Na+); Fluid Consistency: Thin (IDDSI 0)       Discharge Activity:   Activity Instructions       Activity as Tolerated              CODE STATUS:  Code Status and Medical Interventions:   Ordered at: 07/13/24 0840     Code Status (Patient has no pulse and is not breathing):    CPR (Attempt to Resuscitate)     Medical Interventions (Patient has pulse or is breathing):    Full Support       Future Appointments   Date Time Provider Department Center   12/5/2024  1:45 PM Sameer Garsia DO [facility]       Additional Instructions for the Follow-ups that You Need to Schedule       Discharge Follow-up with PCP   As directed       Currently Documented PCP:    Sameer Garsia DO    PCP Phone Number:    [phone]     Follow Up Details: Follow-up in 3-5 days        Discharge Follow-up with Specified Provider: Dr. Ivana Bowen; 1 Week   As directed      To: Dr. Ivana Bowen   Follow Up: 1 Week                Pertinent  and/or Most Recent Results     RADIOLOGY:  CT Chest With Contrast Diagnostic [[id number]] Akira as Reviewed   Order Status: Completed Collected: 07/13/24 0813    Updated: 07/13/24 0828   Narrative:     CT CHEST W CONTRAST DIAGNOSTIC    Date of Exam: 7/13/2024 7:46 AM EDT    Indication: Rule out PE vs PNA (eval SOA, hypoxia, pleuritic CP).    Comparison: CXR 7/13/2024    Technique: Axial CT images were obtained of the chest after the uneventful intravenous administration of iodinated contrast per PE protocol.  Reconstructed coronal and sagittal images were also obtained.  Automated exposure control and iterative  construction methods were used.      Findings:  The pulmonary arteries are well opacified. No filling defects are seen. There are no findings of PE.    Lung window images reveal mild emphysema. Subsegmental atelectasis and/or linear fibrosis at the lung bases. 2.5 cm groundglass opacity is seen in the left upper lobe on series 404 image 34 and series 402 image 112. This may represent pneumonitis, but is   nonspecific. Follow-up CT scan of the chest is recommended in 3 months.    Mediastinal windows reveal no mediastinal, hilar, or axillary adenopathy. Moderate coronary artery calcifications are evident. Calcifications are seen in the aortic valve. A small hiatal hernia is evident.    The gallbladder is absent, surgical clips are seen in the gallbladder bed.    Degenerative changes are seen in the thoracic spine.   Impression:     Impression:  CT scan of the chest with IV contrast demonstrating no findings of PE.    2.5 cm groundglass opacity in the left upper lobe is a nonspecific appearance. Follow-up CT scan of the chest is recommended in 3 months.    Coronary artery calcifications.        Electronically Signed: Leandro Kemp MD   7/13/2024 8:25 AM EDT   Workstation ID: [id number]    XR Chest 1 View [[id number]] Akira as Reviewed   Order Status: Completed Collected: 07/13/24 0249    Updated: 07/13/24 0253   Narrative:     XR CHEST 1 VW-     Date of exam: 7/13/2024, 2:35 A.M.     Indication: Chest pain.     Comparison: 6/28/2024.     FINDINGS:  A single AP (or PA) upright portable chest radiograph was performed.  Mild cardiac enlargement is seen. No acute infiltrate is appreciated. No  pleural effusion or pneumothorax is identified. The thoracic aorta is  prominent, atherosclerotic, and ectatic. Chronic calcified granulomatous  disease involves the chest. Degenerative changes involve the imaged  spine and the bilateral shoulders. There are external artifacts. There  may be  mild chronic pleural-parenchymal scarring in the lung apices. No  significant interval change is seen since the prior study (or studies).      Impression:     No acute infiltrate is appreciated. There is suspected stable mild  cardiomegaly.        Please note that portions of this note were completed with a voice  recognition program.        Electronically Signed By-Jose Escalante MD On:7/13/2024 2:51 AM     LAB RESULTS:      Lab 07/16/24  0549 07/15/24  0555 07/14/24  0531 07/13/24  1356 07/13/24  0827 07/13/24  0435 07/13/24  0230   WBC 6.15 7.00 7.76  --   --   --  13.76*   HEMOGLOBIN 13.0 13.0 12.7  --   --   --  13.1   HEMATOCRIT 39.9 39.8 37.6  --   --   --  38.1   PLATELETS 149 114* 98*  --   --   --  118*   NEUTROS ABS  --   --   --   --   --   --  11.92*   IMMATURE GRANS (ABS)  --   --   --   --   --   --  0.06*   LYMPHS ABS  --   --   --   --   --   --  0.77   MONOS ABS  --   --   --   --   --   --  0.88   EOS ABS  --   --   --   --   --   --  0.10   MCV 95.7 96.6 95.2  --   --   --  93.8   PROCALCITONIN  --   --  0.18 0.18  --  0.09  --    LACTATE  --   --   --   --  0.8  --   --          Lab 07/16/24  0549 07/15/24  0555 07/14/24  0531 07/13/24  0230   SODIUM 141 138 140 140   POTASSIUM 4.1 3.8 3.2* 3.4*   CHLORIDE 107 106 106 105   CO2 27.3 25.4 27.0 24.9   ANION GAP 6.7 6.6 7.0 10.1   BUN 15 14 16 29*   CREATININE 0.68 0.76 0.58 0.92   EGFR 84.9 76.4 88.3 60.8   GLUCOSE 98 91 103* 148*   CALCIUM 9.2 8.8 8.9 8.8   MAGNESIUM 2.0 1.6 1.8 1.7         Lab 07/13/24  0230   TOTAL PROTEIN 5.9*   ALBUMIN 3.8   GLOBULIN 2.1   ALT (SGPT) 12   AST (SGOT) 16   BILIRUBIN 0.5   ALK PHOS 78   LIPASE 15         Lab 07/13/24  0435 07/13/24  0230   PROBNP  --  940.1   HSTROP T 20* 19*                 Brief Urine Lab Results  (Last result in the past 365 days)        Color   Clarity   Blood   Leuk Est   Nitrite   Protein   CREAT   Urine HCG        04/09/24 1049 Yellow   Slightly Cloudy   Negative   Trace   Negative    Negative                 Microbiology Results (last 10 days)       Procedure Component Value - Date/Time    Respiratory Culture - Sputum, Cough [[id number]] Collected: 07/13/24 1836    Lab Status: Final result Specimen: Sputum from Cough Updated: 07/15/24 1019     Respiratory Culture Rare growth Normal respiratory florecita. No S. aureus or Pseudomonas aeruginosa detected. Final report.     Gram Stain Moderate (3+) WBCs seen      Rare (1+) Epithelial cells seen    Respiratory Panel PCR w/COVID-19(SARS-CoV-2) JUDE/BALJIT/SHAHBAZ/PAD/COR/MATTEO In-House, NP Swab in UTM/VTM, 2 HR TAT - Swab, Nasopharynx [[id number]]  (Normal) Collected: 07/13/24 0905    Lab Status: Final result Specimen: Swab from Nasopharynx Updated: 07/13/24 1332     ADENOVIRUS, PCR Not Detected     Coronavirus 229E Not Detected     Coronavirus HKU1 Not Detected     Coronavirus NL63 Not Detected     Coronavirus OC43 Not Detected     COVID19 Not Detected     Human Metapneumovirus Not Detected     Human Rhinovirus/Enterovirus Not Detected     Influenza A PCR Not Detected     Influenza B PCR Not Detected     Parainfluenza Virus 1 Not Detected     Parainfluenza Virus 2 Not Detected     Parainfluenza Virus 3 Not Detected     Parainfluenza Virus 4 Not Detected     RSV, PCR Not Detected     Bordetella pertussis pcr Not Detected     Bordetella parapertussis PCR Not Detected     Chlamydophila pneumoniae PCR Not Detected     Mycoplasma pneumo by PCR Not Detected    Narrative:      In the setting of a positive respiratory panel with a viral infection PLUS a negative procalcitonin without other underlying concern for bacterial infection, consider observing off antibiotics or discontinuation of antibiotics and continue supportive care. If the respiratory panel is positive for atypical bacterial infection (Bordetella pertussis, Chlamydophila pneumoniae, or Mycoplasma pneumoniae), consider antibiotic de-escalation to target atypical bacterial infection.    S. Pneumo Ag Urine or CSF  - Urine, Urine, Clean Catch [[id number]]  (Normal) Collected: 07/13/24 0852    Lab Status: Final result Specimen: Urine, Clean Catch Updated: 07/13/24 1010     Strep Pneumo Ag Negative    Legionella Antigen, Urine - Urine, Urine, Clean Catch [[id number]]  (Normal) Collected: 07/13/24 0852    Lab Status: Final result Specimen: Urine, Clean Catch Updated: 07/13/24 1009     LEGIONELLA ANTIGEN, URINE Negative    Blood Culture - Blood, Arm, Left [[id number]]  (Normal) Collected: 07/13/24 0827    Lab Status: Preliminary result Specimen: Blood from Arm, Left Updated: 07/16/24 0845     Blood Culture No growth at 3 days    Blood Culture - Blood, Arm, Left [[id number]]  (Normal) Collected: 07/13/24 0827    Lab Status: Preliminary result Specimen: Blood from Arm, Left Updated: 07/16/24 0845     Blood Culture No growth at 3 days              Results for orders placed during the hospital encounter of 12/07/21    Duplex venous lower extremity bilateral CAR    Interpretation Summary  · Acute right lower extremity superficial thrombophlebitis noted in the saphenofemoral junction, great saphenous (above knee) and great saphenous (below knee).  · Acute-on-chronic right lower extremity superficial thrombophlebitis noted in the varicosity (below knee).  · All other veins appeared normal bilaterally.      Results for orders placed during the hospital encounter of 12/07/21    Duplex venous lower extremity bilateral CAR    Interpretation Summary  · Acute right lower extremity superficial thrombophlebitis noted in the saphenofemoral junction, great saphenous (above knee) and great saphenous (below knee).  · Acute-on-chronic right lower extremity superficial thrombophlebitis noted in the varicosity (below knee).  · All other veins appeared normal bilaterally.          Labs Pending at Discharge:  Pending Labs       Order Current Status    Blood Culture - Blood, Arm, Left Preliminary result    Blood Culture - Blood, Arm, Left Preliminary result             Time spent on Discharge including face to face service:  32 minutes    Electronically signed by Elgin Tabares MD, 07/16/24, 9:30 AM EDT.

## 2024-07-17 ENCOUNTER — TRANSITIONAL CARE MANAGEMENT TELEPHONE ENCOUNTER (OUTPATIENT)
Dept: CALL CENTER | Facility: HOSPITAL | Age: 89
End: 2024-07-17
Payer: MEDICARE

## 2024-07-17 ENCOUNTER — TELEPHONE (OUTPATIENT)
Dept: FAMILY MEDICINE CLINIC | Facility: CLINIC | Age: 89
End: 2024-07-17

## 2024-07-17 NOTE — OUTREACH NOTE
Call Center TCM Note      Flowsheet Row Responses   [facility] patient discharged from? [facility]   Does the patient have one of the following disease processes/diagnoses(primary or secondary)? Other   TCM attempt successful? No   Unsuccessful attempts Attempt 1  [Attempted to reach patient and son,listed on PCP verbal release. No answer.]   Call Status Left message  [Message left on patient voicemail.]            Светлана Holloway RN    7/17/2024, 13:46 EDT

## 2024-07-17 NOTE — OUTREACH NOTE
Call Center TCM Note      Flowsheet Row Responses   [facility] patient discharged from? [facility]   Does the patient have one of the following disease processes/diagnoses(primary or secondary)? Other   TCM attempt successful? No   Unsuccessful attempts Attempt 2            Светлана Holloway RN    7/17/2024, 16:49 EDT

## 2024-07-17 NOTE — TELEPHONE ENCOUNTER
Caller: Kristine Rahman    Relationship: Self    Best call back number:     [phone]     Who are you requesting to speak with (clinical staff, provider,  specific staff member): GOGO OR HIS CLINICAL STAFF    What was the call regarding: PATIENT STATES THAT SHE JUST SAW ON HER PAPERWORK THAT SHE WAS SUPPOSED TO STOP TAKING ASPIRIN BUT SHE HAS ALREADY TAKEN IT AND HER PLAVIX TODAY.     SHE STATES THAT SHE NEEDS TO KNOW IF THIS IS OK.     PATIENT WOULD LIKE A CALLBACK TO DISCUSS.

## 2024-07-18 ENCOUNTER — TRANSITIONAL CARE MANAGEMENT TELEPHONE ENCOUNTER (OUTPATIENT)
Dept: CALL CENTER | Facility: HOSPITAL | Age: 89
End: 2024-07-18
Payer: MEDICARE

## 2024-07-18 LAB
BACTERIA SPEC AEROBE CULT: NORMAL
BACTERIA SPEC AEROBE CULT: NORMAL

## 2024-07-18 NOTE — OUTREACH NOTE
Call Center TCM Note      Flowsheet Row Responses   [facility] patient discharged from? [facility]   Does the patient have one of the following disease processes/diagnoses(primary or secondary)? Other   TCM attempt successful? No   Unsuccessful attempts Attempt 3   Call Status Left message   Does the patient have an appointment with their PCP within 7-14 days of discharge? Yes  [7/19/2024 at 11:30 AM]            Chrystal Soria RN    7/18/2024, 10:40 EDT

## 2024-07-22 ENCOUNTER — TELEPHONE (OUTPATIENT)
Dept: OBSTETRICS AND GYNECOLOGY | Facility: CLINIC | Age: 89
End: 2024-07-22

## 2024-07-22 RX ORDER — METOPROLOL TARTRATE 50 MG/1
50 TABLET, FILM COATED ORAL 2 TIMES DAILY
Qty: 180 TABLET | Refills: 1 | Status: SHIPPED | OUTPATIENT
Start: 2024-07-22

## 2024-07-22 NOTE — TELEPHONE ENCOUNTER
Caller: Kristine Rahman    Relationship to patient: Self    Best call back number: [phone]    Chief complaint: VAGINAL DISCOMFORT, BURNING, SWELLING- EVEN EXTENDS TO THE ANAL AREA    Type of visit: GYN FOLLOW UP    Requested date: ASAP    Additional notes:HAS USED THE ESTRADIOL CREAM THAT WAS GIVEN IT HASN'T HELPED//NO APPTS IN THE NEAR FUTURE PLEASE FOLLOW UP

## 2024-07-25 RX ORDER — HYDRALAZINE HYDROCHLORIDE 50 MG/1
50 TABLET, FILM COATED ORAL 3 TIMES DAILY
Qty: 270 TABLET | Refills: 1 | Status: SHIPPED | OUTPATIENT
Start: 2024-07-25

## 2024-07-25 NOTE — PROGRESS NOTES
"GYN Problem/Follow Up Visit    Chief Complaint   Patient presents with    Vaginitis           HPI  Kristine Rahman is a 88 y.o. female, , who presents for vulvar/vaginal irritation and itch, extending to the rectum, for over 2 months, prior use vulvar/vaginal estrogen cream- no improvement, also has used otc monistat x 3 doses, last dose yesterday, no notable improvement    Use of incontinence underwear at night or when traveling.  Recent use of antibiotics for URI     Additional OB/GYN History   No LMP recorded. Patient is postmenopausal.  Current contraception: contraceptive methods: Post menopausal status    Past Medical History:   Diagnosis Date    Anxiety     Arthritis     Asthma     Basal cell carcinoma     on nose    Crohn's disease     Depression     Diverticulitis     Gastric ulcer     Hammer toe     Hyperlipidemia     Hypertension       Past Surgical History:   Procedure Laterality Date    CHOLECYSTECTOMY      COLONOSCOPY      HEMORRHOIDECTOMY        Family History   Problem Relation Age of Onset    Colon cancer Cousin     Diabetes Daughter     Hypertension Daughter     Hypertension Son      Allergies as of 2024 - Reviewed 2024   Allergen Reaction Noted    Latex Unknown - Low Severity 2021      The additional following portions of the patient's history were reviewed and updated as appropriate: allergies, current medications, past family history, past medical history, past social history, past surgical history, and problem list.    Review of Systems    See HPI for pertinent ROS    Objective   /67   Pulse 55   Ht 156.2 cm (61.5\")   Wt 70.3 kg (155 lb)   Breastfeeding No   BMI 28.81 kg/m²     Physical Exam  Vitals and nursing note reviewed. Exam conducted with a chaperone present.   Constitutional:       Appearance: Normal appearance.   Cardiovascular:      Rate and Rhythm: Normal rate.   Pulmonary:      Effort: Pulmonary effort is normal.   Genitourinary:     Labia:        "  Right: Tenderness present. No rash, lesion or injury.         Left: Tenderness present. No rash, lesion or injury.       Vagina: No signs of injury and foreign body. Prolapsed vaginal walls (moderate atrophy, cystocele grade 1) present. No vaginal discharge, erythema, tenderness, bleeding or lesions.      Cervix: Normal.      Uterus: Normal.       Comments: Hypopigmentation of the inner left and right labia majora extending to rectum in a figure 8 pattern  Lymphadenopathy:      Lower Body: No right inguinal adenopathy. No left inguinal adenopathy.   Skin:     General: Skin is warm and dry.   Neurological:      Mental Status: She is alert and oriented to person, place, and time.          Assessment and Plan    Diagnoses and all orders for this visit:    1. Lichen sclerosus (Primary)  -     clobetasol (Temovate) 0.05 % ointment; Apply 1 Application topically to the appropriate area as directed 2 (Two) Times a Day for 30 days. APPLY THIN LAYER TO AFFECTED AREA TWICE DAILY FOR 2 WEEKS THEN TWICE WEEKLY prn  Dispense: 45 g; Refill: 2    2. Acute vaginitis  -     Gardnerella vaginalis, Trichomonas vaginalis, Candida albicans, DNA - Swab, Vagina        Counseling:  Counseled regarding management of lichen sclerosus, use of vulvar steroid prn flairs, report persistent or worsening symptoms. Vaginitis panel collected, recent use antibiotics, rule out candida vaginitis. She has discontinued use of vaginal estrogen therapy due to lack of symptom improvement      Follow Up:  Return in about 6 weeks (around 9/10/2024).        Terrie Tolbert, APRN  07/30/2024

## 2024-07-30 ENCOUNTER — OFFICE VISIT (OUTPATIENT)
Dept: OBSTETRICS AND GYNECOLOGY | Facility: CLINIC | Age: 89
End: 2024-07-30
Payer: MEDICARE

## 2024-07-30 VITALS
DIASTOLIC BLOOD PRESSURE: 67 MMHG | SYSTOLIC BLOOD PRESSURE: 115 MMHG | BODY MASS INDEX: 28.52 KG/M2 | HEIGHT: 62 IN | WEIGHT: 155 LBS | HEART RATE: 55 BPM

## 2024-07-30 DIAGNOSIS — L90.0 LICHEN SCLEROSUS: Primary | ICD-10-CM

## 2024-07-30 DIAGNOSIS — N76.0 ACUTE VAGINITIS: ICD-10-CM

## 2024-07-30 LAB
CANDIDA SPECIES: POSITIVE
GARDNERELLA VAGINALIS: NEGATIVE
T VAGINALIS DNA VAG QL PROBE+SIG AMP: NEGATIVE

## 2024-07-30 PROCEDURE — 99459 PELVIC EXAMINATION: CPT | Performed by: NURSE PRACTITIONER

## 2024-07-30 PROCEDURE — 1159F MED LIST DOCD IN RCRD: CPT | Performed by: NURSE PRACTITIONER

## 2024-07-30 PROCEDURE — 87510 GARDNER VAG DNA DIR PROBE: CPT | Performed by: NURSE PRACTITIONER

## 2024-07-30 PROCEDURE — 99213 OFFICE O/P EST LOW 20 MIN: CPT | Performed by: NURSE PRACTITIONER

## 2024-07-30 PROCEDURE — 87660 TRICHOMONAS VAGIN DIR PROBE: CPT | Performed by: NURSE PRACTITIONER

## 2024-07-30 PROCEDURE — 1160F RVW MEDS BY RX/DR IN RCRD: CPT | Performed by: NURSE PRACTITIONER

## 2024-07-30 PROCEDURE — 87480 CANDIDA DNA DIR PROBE: CPT | Performed by: NURSE PRACTITIONER

## 2024-07-30 RX ORDER — CLOBETASOL PROPIONATE 0.5 MG/G
1 OINTMENT TOPICAL 2 TIMES DAILY
Qty: 45 G | Refills: 2 | Status: SHIPPED | OUTPATIENT
Start: 2024-07-30 | End: 2024-08-29

## 2024-07-31 RX ORDER — FLUCONAZOLE 100 MG/1
100 TABLET ORAL DAILY
Qty: 2 TABLET | Refills: 0 | Status: SHIPPED | OUTPATIENT
Start: 2024-07-31 | End: 2024-08-01

## 2024-08-01 ENCOUNTER — TELEPHONE (OUTPATIENT)
Dept: OBSTETRICS AND GYNECOLOGY | Facility: CLINIC | Age: 89
End: 2024-08-01
Payer: MEDICARE

## 2024-08-01 NOTE — TELEPHONE ENCOUNTER
Office left message for patient to call back to schedule a 6 week gyn follow up - around 09 10 2024, last seen 7/30/2024.    Hub ok to schedule if open slots.

## 2024-08-06 ENCOUNTER — OFFICE VISIT (OUTPATIENT)
Dept: FAMILY MEDICINE CLINIC | Facility: CLINIC | Age: 89
End: 2024-08-06
Payer: MEDICARE

## 2024-08-06 VITALS
OXYGEN SATURATION: 93 % | BODY MASS INDEX: 28.52 KG/M2 | SYSTOLIC BLOOD PRESSURE: 140 MMHG | TEMPERATURE: 98 F | DIASTOLIC BLOOD PRESSURE: 52 MMHG | WEIGHT: 155 LBS | HEART RATE: 62 BPM | HEIGHT: 62 IN

## 2024-08-06 DIAGNOSIS — R91.1 LUNG NODULE: ICD-10-CM

## 2024-08-06 DIAGNOSIS — I10 ESSENTIAL HYPERTENSION: ICD-10-CM

## 2024-08-06 DIAGNOSIS — E78.00 PURE HYPERCHOLESTEROLEMIA: Primary | ICD-10-CM

## 2024-08-06 DIAGNOSIS — I48.11 LONGSTANDING PERSISTENT ATRIAL FIBRILLATION: ICD-10-CM

## 2024-08-06 DIAGNOSIS — R73.03 PRE-DIABETES: ICD-10-CM

## 2024-08-06 PROCEDURE — 99214 OFFICE O/P EST MOD 30 MIN: CPT | Performed by: FAMILY MEDICINE

## 2024-08-06 PROCEDURE — 1126F AMNT PAIN NOTED NONE PRSNT: CPT | Performed by: FAMILY MEDICINE

## 2024-08-06 NOTE — ASSESSMENT & PLAN NOTE
Will recheck her A1c.  It is historically always been very good.  I do not think her blurred vision or excessive urination are related to this.

## 2024-08-06 NOTE — ASSESSMENT & PLAN NOTE
When she was admitted to the hospital in mid July it just so a pulmonary nodule.  Radiology recommended a short-term follow-up in 3 months.

## 2024-08-06 NOTE — ASSESSMENT & PLAN NOTE
Clinically today she sounds regular.  Will continue her current meds and attempt to get the notes from her cardiologist.

## 2024-08-06 NOTE — PROGRESS NOTES
Chief Complaint   Patient presents with   • Follow-up   • Hyperlipidemia   • Hypertension        Subjective     Kristine Rahman  has a past medical history of Anxiety, Arthritis, Asthma, Basal cell carcinoma, Crohn's disease, Depression, Diverticulitis, Gastric ulcer, and Hammer toe.    Atrial fibrillation-she was admitted at [facility] July 13.  It was at that time she was discovered to have atrial fibrillation.  She is currently on medication including Eliquis.  She takes it twice daily.  Her cardiologist is Dr. Monty Mijares.  She states since she has been on the medication she really does not notice much palpitations or tachycardia.    Prediabetes-she does not check her blood sugars outside the office.  She does not necessarily moderate her carbohydrates.  She states since her hospitalization a month ago she has lost some weight.  She does complain of excessive urination and blurred vision.    Hypertension-she does not check her blood pressure at home.  It is a little on the low side here today at 104/48.  She does state at times she feels somewhat lightheaded.    Hyperlipidemia-she takes her pravastatin nightly.      PHQ-2 Depression Screening  Little interest or pleasure in doing things?     Feeling down, depressed, or hopeless?     PHQ-2 Total Score     PHQ-9 Depression Screening  Little interest or pleasure in doing things?     Feeling down, depressed, or hopeless?     Trouble falling or staying asleep, or sleeping too much?     Feeling tired or having little energy?     Poor appetite or overeating?     Feeling bad about yourself - or that you are a failure or have let yourself or your family down?     Trouble concentrating on things, such as reading the newspaper or watching television?     Moving or speaking so slowly that other people could have noticed? Or the opposite - being so fidgety or restless that you have been moving around a lot more than usual?     Thoughts that you would be better off  dead, or of hurting yourself in some way?     PHQ-9 Total Score     If you checked off any problems, how difficult have these problems made it for you to do your work, take care of things at home, or get along with other people?       Allergies   Allergen Reactions   • Latex Unknown - Low Severity       Prior to Admission medications    Medication Sig Start Date End Date Taking? Authorizing Provider   apixaban (ELIQUIS) 5 MG tablet tablet Take 1 tablet by mouth 2 (Two) Times a Day for 30 days. Indications: Atrial Fibrillation 7/16/24 8/15/24 Yes Elgin Tabares MD   benazepril (LOTENSIN) 40 MG tablet Take 1 tablet by mouth Daily. 2/5/24  Yes Sameer Garsia DO   Calcium Carbonate-Vit D-Min (CALCIUM 1200 PO) Take 1,200 mg by mouth Daily.   Yes Vane Greenberg MD   chlorthalidone (HYGROTON) 25 MG tablet Take 1 tablet by mouth Daily. 2/5/24  Yes Sameer Garsia DO   clobetasol (Temovate) 0.05 % ointment Apply 1 Application topically to the appropriate area as directed 2 (Two) Times a Day for 30 days. APPLY THIN LAYER TO AFFECTED AREA TWICE DAILY FOR 2 WEEKS THEN TWICE WEEKLY prn 7/30/24 8/29/24 Yes Terrie Tolbert APRN   clopidogrel (PLAVIX) 75 MG tablet Take 1 tablet by mouth Daily. 2/5/24  Yes Sameer Garsia DO   estradiol (ESTRACE) 0.1 MG/GM vaginal cream INSERT 2 GRAMS VAGINALLY 3 TIMES A WEEK 6/21/24  Yes Lemuel Pagan MD   hydrALAZINE (APRESOLINE) 50 MG tablet TAKE ONE TABLET BY MOUTH THREE TIMES A DAY 7/25/24  Yes Sameer Garsia DO   metoprolol tartrate (LOPRESSOR) 50 MG tablet Take 1 tablet by mouth 2 (Two) Times a Day. 7/22/24  Yes Sameer Garsia DO   multivitamin with minerals (CENTRUM SILVER PO) Take 1 tablet by mouth Daily.   Yes Vane Greenberg MD   pravastatin (PRAVACHOL) 20 MG tablet Take 1 tablet by mouth Every Night. 2/5/24  Yes Sameer Garsia DO   sertraline (ZOLOFT) 100 MG tablet Take 1 tablet by mouth Daily.   Yes  Provider, MD Vane   amLODIPine (NORVASC) 10 MG tablet TAKE 1 TABLET DAILY 5/28/24   Sameer Garsia DO   guaifenesin-dextromethorphan 600-30 mg (MUCINEX DM)  MG tablet sustained-release 12 hour Take 2 tablets by mouth 2 (Two) Times a Day As Needed (Cough).  Patient not taking: Reported on 8/6/2024 6/28/24 8/6/24  Bhavani Hwang APRN        Patient Active Problem List   Diagnosis   • Anxiety   • Arthritis   • Asthma   • Colon polyps   • Crohn's disease   • Depression   • Pre-diabetes   • Diverticulitis   • Essential hypertension   • Gastric ulcer   • Hyperlipemia   • Seasonal allergic rhinitis   • Shortness of breath   • Ulcerative lesion   • Urinary incontinence without sensory awareness   • COVID-19 virus detected   • Lesion of skin of nose   • Thyroid nodule   • Medicare annual wellness visit, subsequent   • Need for tetanus, diphtheria, and acellular pertussis (Tdap) vaccine   • Need for shingles vaccine   • Post-menopausal   • Acute hypoxic respiratory failure   • Longstanding persistent atrial fibrillation   • Lung nodule        Past Surgical History:   Procedure Laterality Date   • CHOLECYSTECTOMY     • COLONOSCOPY     • HEMORRHOIDECTOMY         Social History     Socioeconomic History   • Marital status:    • Number of children: 3   Tobacco Use   • Smoking status: Never     Passive exposure: Never   • Smokeless tobacco: Never   Vaping Use   • Vaping status: Never Used   Substance and Sexual Activity   • Alcohol use: Not Currently   • Drug use: Never   • Sexual activity: Not Currently     Birth control/protection: Post-menopausal       Family History   Problem Relation Age of Onset   • Colon cancer Cousin    • Diabetes Daughter    • Hypertension Daughter    • Hypertension Son        Family history, surgical history, past medical history, Allergies and meds reviewed with patient today and updated in [facility] EMR.     ROS:  Review of Systems   Constitutional:  Positive for  "fatigue.   HENT:  Positive for rhinorrhea. Negative for congestion and postnasal drip.    Eyes:  Positive for blurred vision. Negative for visual disturbance.   Respiratory:  Negative for cough, chest tightness, shortness of breath and wheezing.    Cardiovascular:  Negative for chest pain and palpitations.   Endocrine: Positive for polyuria. Negative for polydipsia.   Allergic/Immunologic: Negative for environmental allergies.   Neurological:  Positive for light-headedness. Negative for headache.   Psychiatric/Behavioral:  Negative for depressed mood. The patient is not nervous/anxious.        OBJECTIVE:  Vitals:    08/06/24 1443 08/06/24 1535   BP: 104/48 140/52   BP Location: Left arm Left arm   Patient Position: Sitting Sitting   Cuff Size:  Adult   Pulse: 62    Temp: 98 °F (36.7 °C)    SpO2: 93%    Weight: 70.3 kg (155 lb)    Height: 156.2 cm (61.5\")      No results found.   Body mass index is 28.81 kg/m².  No LMP recorded. Patient is postmenopausal.    The ASCVD Risk score ([place] DK, et al., 2019) failed to calculate for the following reasons:    The 2019 ASCVD risk score is only valid for ages 40 to 79     Physical Exam  Vitals and nursing note reviewed.   Constitutional:       General: She is not in acute distress.     Appearance: Normal appearance. She is normal weight.   HENT:      Head: Normocephalic.      Right Ear: Tympanic membrane, ear canal and external ear normal.      Left Ear: Tympanic membrane, ear canal and external ear normal.      Nose: Nose normal.      Mouth/Throat:      Mouth: Mucous membranes are moist.      Pharynx: Oropharynx is clear.   Eyes:      General: No scleral icterus.     Conjunctiva/sclera: Conjunctivae normal.      Pupils: Pupils are equal, round, and reactive to light.   Neck:      Vascular: Normal carotid pulses. No carotid bruit.   Cardiovascular:      Rate and Rhythm: Normal rate and regular rhythm.      Pulses: Normal pulses.      Heart sounds: Normal heart sounds. No " murmur heard.  Pulmonary:      Effort: Pulmonary effort is normal.      Breath sounds: Normal breath sounds. No wheezing, rhonchi or rales.   Musculoskeletal:      Cervical back: Neck supple. No rigidity or tenderness.   Lymphadenopathy:      Cervical: No cervical adenopathy.   Skin:     General: Skin is warm and dry.      Coloration: Skin is not jaundiced.      Findings: No rash.   Neurological:      General: No focal deficit present.      Mental Status: She is alert and oriented to person, place, and time.   Psychiatric:         Mood and Affect: Mood normal.         Thought Content: Thought content normal.         Judgment: Judgment normal.       Procedures    Office Visit on 07/30/2024   Component Date Value Ref Range Status   • GARDNERELLA VAGINALIS 07/30/2024 Negative  Negative Final   • TRICHOMONAS VAGINALIS 07/30/2024 Negative  Negative Final   • OZ SPECIES 07/30/2024 Positive (A)  Negative Final   Admission on 07/13/2024, Discharged on 07/16/2024   Component Date Value Ref Range Status   • QT Interval 07/13/2024 396  ms Final   • QTC Interval 07/13/2024 436  ms Final   • QT Interval 07/13/2024 330  ms Final   • QTC Interval 07/13/2024 448  ms Final   • HS Troponin T 07/13/2024 19 (H)  <14 ng/L Final   • Glucose 07/13/2024 148 (H)  65 - 99 mg/dL Final   • BUN 07/13/2024 29 (H)  8 - 23 mg/dL Final   • Creatinine 07/13/2024 0.92  0.57 - 1.00 mg/dL Final   • Sodium 07/13/2024 140  136 - 145 mmol/L Final   • Potassium 07/13/2024 3.4 (L)  3.5 - 5.2 mmol/L Final   • Chloride 07/13/2024 105  98 - 107 mmol/L Final   • CO2 07/13/2024 24.9  22.0 - 29.0 mmol/L Final   • Calcium 07/13/2024 8.8  8.6 - 10.5 mg/dL Final   • Total Protein 07/13/2024 5.9 (L)  6.0 - 8.5 g/dL Final   • Albumin 07/13/2024 3.8  3.5 - 5.2 g/dL Final   • ALT (SGPT) 07/13/2024 12  1 - 33 U/L Final   • AST (SGOT) 07/13/2024 16  1 - 32 U/L Final   • Alkaline Phosphatase 07/13/2024 78  39 - 117 U/L Final   • Total Bilirubin 07/13/2024 0.5  0.0 -  1.2 mg/dL Final   • Globulin 07/13/2024 2.1  gm/dL Final   • A/G Ratio 07/13/2024 1.8  g/dL Final   • BUN/Creatinine Ratio 07/13/2024 31.5 (H)  7.0 - 25.0 Final   • Anion Gap 07/13/2024 10.1  5.0 - 15.0 mmol/L Final   • eGFR 07/13/2024 60.8  >60.0 mL/min/1.73 Final   • Lipase 07/13/2024 15  13 - 60 U/L Final   • proBNP 07/13/2024 940.1  0.0 - 1,800.0 pg/mL Final   • Magnesium 07/13/2024 1.7  1.6 - 2.4 mg/dL Final   • Extra Tube 07/13/2024 Hold for add-ons.   Final    Auto resulted.   • Extra Tube 07/13/2024 hold for add-on   Final    Auto resulted   • Extra Tube 07/13/2024 Hold for add-ons.   Final    Auto resulted.   • Extra Tube 07/13/2024 Hold for add-ons.   Final    Auto resulted   • WBC 07/13/2024 13.76 (H)  3.40 - 10.80 10*3/mm3 Final   • RBC 07/13/2024 4.06  3.77 - 5.28 10*6/mm3 Final   • Hemoglobin 07/13/2024 13.1  12.0 - 15.9 g/dL Final   • Hematocrit 07/13/2024 38.1  34.0 - 46.6 % Final   • MCV 07/13/2024 93.8  79.0 - 97.0 fL Final   • MCH 07/13/2024 32.3  26.6 - 33.0 pg Final   • MCHC 07/13/2024 34.4  31.5 - 35.7 g/dL Final   • RDW 07/13/2024 12.8  12.3 - 15.4 % Final   • RDW-SD 07/13/2024 44.3  37.0 - 54.0 fl Final   • MPV 07/13/2024 10.9  6.0 - 12.0 fL Final   • Platelets 07/13/2024 118 (L)  140 - 450 10*3/mm3 Final   • Neutrophil % 07/13/2024 86.7 (H)  42.7 - 76.0 % Final   • Lymphocyte % 07/13/2024 5.6 (L)  19.6 - 45.3 % Final   • Monocyte % 07/13/2024 6.4  5.0 - 12.0 % Final   • Eosinophil % 07/13/2024 0.7  0.3 - 6.2 % Final   • Basophil % 07/13/2024 0.2  0.0 - 1.5 % Final   • Immature Grans % 07/13/2024 0.4  0.0 - 0.5 % Final   • Neutrophils, Absolute 07/13/2024 11.92 (H)  1.70 - 7.00 10*3/mm3 Final   • Lymphocytes, Absolute 07/13/2024 0.77  0.70 - 3.10 10*3/mm3 Final   • Monocytes, Absolute 07/13/2024 0.88  0.10 - 0.90 10*3/mm3 Final   • Eosinophils, Absolute 07/13/2024 0.10  0.00 - 0.40 10*3/mm3 Final   • Basophils, Absolute 07/13/2024 0.03  0.00 - 0.20 10*3/mm3 Final   • Immature Grans, Absolute  07/13/2024 0.06 (H)  0.00 - 0.05 10*3/mm3 Final   • nRBC 07/13/2024 0.0  0.0 - 0.2 /100 WBC Final   • Elliptocytes 07/13/2024 Slight/1+  None Seen Final   • Ovalocytes 07/13/2024 Slight/1+  None Seen Final   • Poikilocytes 07/13/2024 Slight/1+  None Seen Final   • WBC Morphology 07/13/2024 Normal  Normal Final   • Platelet Morphology 07/13/2024 Normal  Normal Final   • HS Troponin T 07/13/2024 20 (H)  <14 ng/L Final   • Troponin T Delta 07/13/2024 1  >=-4 - <+4 ng/L Final   • Lactate 07/13/2024 0.8  0.5 - 2.0 mmol/L Final   • Blood Culture 07/13/2024 No growth at 5 days   Final   • Blood Culture 07/13/2024 No growth at 5 days   Final   • Procalcitonin 07/13/2024 0.09  0.00 - 0.25 ng/mL Final   • Respiratory Culture 07/13/2024 Rare growth Normal respiratory florecita. No S. aureus or Pseudomonas aeruginosa detected. Final report.   Final   • Gram Stain 07/13/2024 Moderate (3+) WBCs seen   Final   • Gram Stain 07/13/2024 Rare (1+) Epithelial cells seen   Final   • Strep Pneumo Ag 07/13/2024 Negative  Negative Final   • LEGIONELLA ANTIGEN, URINE 07/13/2024 Negative  Negative Final   • ADENOVIRUS, PCR 07/13/2024 Not Detected  Not Detected Final   • Coronavirus 229E 07/13/2024 Not Detected  Not Detected Final   • Coronavirus HKU1 07/13/2024 Not Detected  Not Detected Final   • Coronavirus NL63 07/13/2024 Not Detected  Not Detected Final   • Coronavirus OC43 07/13/2024 Not Detected  Not Detected Final   • COVID19 07/13/2024 Not Detected  Not Detected - Ref. Range Final   • Human Metapneumovirus 07/13/2024 Not Detected  Not Detected Final   • Human Rhinovirus/Enterovirus 07/13/2024 Not Detected  Not Detected Final   • Influenza A PCR 07/13/2024 Not Detected  Not Detected Final   • Influenza B PCR 07/13/2024 Not Detected  Not Detected Final   • Parainfluenza Virus 1 07/13/2024 Not Detected  Not Detected Final   • Parainfluenza Virus 2 07/13/2024 Not Detected  Not Detected Final   • Parainfluenza Virus 3 07/13/2024 Not Detected   Not Detected Final   • Parainfluenza Virus 4 07/13/2024 Not Detected  Not Detected Final   • RSV, PCR 07/13/2024 Not Detected  Not Detected Final   • Bordetella pertussis pcr 07/13/2024 Not Detected  Not Detected Final   • Bordetella parapertussis PCR 07/13/2024 Not Detected  Not Detected Final   • Chlamydophila pneumoniae PCR 07/13/2024 Not Detected  Not Detected Final   • Mycoplasma pneumo by PCR 07/13/2024 Not Detected  Not Detected Final   • QT Interval 07/13/2024 402  ms Final   • QTC Interval 07/13/2024 429  ms Final   • Procalcitonin 07/13/2024 0.18  0.00 - 0.25 ng/mL Final   • Glucose 07/14/2024 103 (H)  65 - 99 mg/dL Final   • BUN 07/14/2024 16  8 - 23 mg/dL Final   • Creatinine 07/14/2024 0.58  0.57 - 1.00 mg/dL Final   • Sodium 07/14/2024 140  136 - 145 mmol/L Final   • Potassium 07/14/2024 3.2 (L)  3.5 - 5.2 mmol/L Final   • Chloride 07/14/2024 106  98 - 107 mmol/L Final   • CO2 07/14/2024 27.0  22.0 - 29.0 mmol/L Final   • Calcium 07/14/2024 8.9  8.6 - 10.5 mg/dL Final   • BUN/Creatinine Ratio 07/14/2024 27.6 (H)  7.0 - 25.0 Final   • Anion Gap 07/14/2024 7.0  5.0 - 15.0 mmol/L Final   • eGFR 07/14/2024 88.3  >60.0 mL/min/1.73 Final   • WBC 07/14/2024 7.76  3.40 - 10.80 10*3/mm3 Final   • RBC 07/14/2024 3.95  3.77 - 5.28 10*6/mm3 Final   • Hemoglobin 07/14/2024 12.7  12.0 - 15.9 g/dL Final   • Hematocrit 07/14/2024 37.6  34.0 - 46.6 % Final   • MCV 07/14/2024 95.2  79.0 - 97.0 fL Final   • MCH 07/14/2024 32.2  26.6 - 33.0 pg Final   • MCHC 07/14/2024 33.8  31.5 - 35.7 g/dL Final   • RDW 07/14/2024 12.7  12.3 - 15.4 % Final   • RDW-SD 07/14/2024 44.7  37.0 - 54.0 fl Final   • MPV 07/14/2024 12.0  6.0 - 12.0 fL Final   • Platelets 07/14/2024 98 (L)  140 - 450 10*3/mm3 Final   • Magnesium 07/14/2024 1.8  1.6 - 2.4 mg/dL Final   • Procalcitonin 07/14/2024 0.18  0.00 - 0.25 ng/mL Final   • Glucose 07/15/2024 91  65 - 99 mg/dL Final   • BUN 07/15/2024 14  8 - 23 mg/dL Final   • Creatinine 07/15/2024 0.76   0.57 - 1.00 mg/dL Final   • Sodium 07/15/2024 138  136 - 145 mmol/L Final   • Potassium 07/15/2024 3.8  3.5 - 5.2 mmol/L Final   • Chloride 07/15/2024 106  98 - 107 mmol/L Final   • CO2 07/15/2024 25.4  22.0 - 29.0 mmol/L Final   • Calcium 07/15/2024 8.8  8.6 - 10.5 mg/dL Final   • BUN/Creatinine Ratio 07/15/2024 18.4  7.0 - 25.0 Final   • Anion Gap 07/15/2024 6.6  5.0 - 15.0 mmol/L Final   • eGFR 07/15/2024 76.4  >60.0 mL/min/1.73 Final   • WBC 07/15/2024 7.00  3.40 - 10.80 10*3/mm3 Final   • RBC 07/15/2024 4.12  3.77 - 5.28 10*6/mm3 Final   • Hemoglobin 07/15/2024 13.0  12.0 - 15.9 g/dL Final   • Hematocrit 07/15/2024 39.8  34.0 - 46.6 % Final   • MCV 07/15/2024 96.6  79.0 - 97.0 fL Final   • MCH 07/15/2024 31.6  26.6 - 33.0 pg Final   • MCHC 07/15/2024 32.7  31.5 - 35.7 g/dL Final   • RDW 07/15/2024 13.0  12.3 - 15.4 % Final   • RDW-SD 07/15/2024 45.8  37.0 - 54.0 fl Final   • MPV 07/15/2024 11.7  6.0 - 12.0 fL Final   • Platelets 07/15/2024 114 (L)  140 - 450 10*3/mm3 Final   • Magnesium 07/15/2024 1.6  1.6 - 2.4 mg/dL Final   • Glucose 07/16/2024 98  65 - 99 mg/dL Final   • BUN 07/16/2024 15  8 - 23 mg/dL Final   • Creatinine 07/16/2024 0.68  0.57 - 1.00 mg/dL Final   • Sodium 07/16/2024 141  136 - 145 mmol/L Final   • Potassium 07/16/2024 4.1  3.5 - 5.2 mmol/L Final   • Chloride 07/16/2024 107  98 - 107 mmol/L Final   • CO2 07/16/2024 27.3  22.0 - 29.0 mmol/L Final   • Calcium 07/16/2024 9.2  8.6 - 10.5 mg/dL Final   • BUN/Creatinine Ratio 07/16/2024 22.1  7.0 - 25.0 Final   • Anion Gap 07/16/2024 6.7  5.0 - 15.0 mmol/L Final   • eGFR 07/16/2024 84.9  >60.0 mL/min/1.73 Final   • WBC 07/16/2024 6.15  3.40 - 10.80 10*3/mm3 Final   • RBC 07/16/2024 4.17  3.77 - 5.28 10*6/mm3 Final   • Hemoglobin 07/16/2024 13.0  12.0 - 15.9 g/dL Final   • Hematocrit 07/16/2024 39.9  34.0 - 46.6 % Final   • MCV 07/16/2024 95.7  79.0 - 97.0 fL Final   • MCH 07/16/2024 31.2  26.6 - 33.0 pg Final   • MCHC 07/16/2024 32.6  31.5 - 35.7  g/dL Final   • RDW 07/16/2024 12.7  12.3 - 15.4 % Final   • RDW-SD 07/16/2024 44.6  37.0 - 54.0 fl Final   • MPV 07/16/2024 11.5  6.0 - 12.0 fL Final   • Platelets 07/16/2024 149  140 - 450 10*3/mm3 Final   • Magnesium 07/16/2024 2.0  1.6 - 2.4 mg/dL Final       ASSESSMENT/ PLAN:    Diagnoses and all orders for this visit:    1. Pure hypercholesterolemia (Primary)  Assessment & Plan:   Will update her lipid profile with her labs.      2. Essential hypertension  Assessment & Plan:  Her blood pressure here today is a little low.  Will recheck it 1 more time.    Orders:  -     Comprehensive Metabolic Panel  -     Lipid Panel    3. Pre-diabetes  Assessment & Plan:  Will recheck her A1c.  It is historically always been very good.  I do not think her blurred vision or excessive urination are related to this.    Orders:  -     Hemoglobin A1c    4. Longstanding persistent atrial fibrillation  Assessment & Plan:  Clinically today she sounds regular.  Will continue her current meds and attempt to get the notes from her cardiologist.      5. Lung nodule  Assessment & Plan:  When she was admitted to the hospital in mid July it just so a pulmonary nodule.  Radiology recommended a short-term follow-up in 3 months.    Orders:  -     CT Chest Without Contrast Diagnostic; Future               Orders Placed Today:     No orders of the defined types were placed in this encounter.       Management Plan:     An After Visit Summary was printed and given to the patient at discharge.    Follow-up: Return in about 6 months (around 2/6/2025) for Recheck.    Sameer Garsia DO 8/6/2024 15:38 EDT  This note was electronically signed.

## 2024-08-12 ENCOUNTER — LAB (OUTPATIENT)
Dept: LAB | Facility: HOSPITAL | Age: 89
End: 2024-08-12
Payer: MEDICARE

## 2024-08-12 LAB
ALBUMIN SERPL-MCNC: 4.2 G/DL (ref 3.5–5.2)
ALBUMIN/GLOB SERPL: 1.8 G/DL
ALP SERPL-CCNC: 99 U/L (ref 39–117)
ALT SERPL W P-5'-P-CCNC: 17 U/L (ref 1–33)
ANION GAP SERPL CALCULATED.3IONS-SCNC: 9.2 MMOL/L (ref 5–15)
AST SERPL-CCNC: 23 U/L (ref 1–32)
BILIRUB SERPL-MCNC: 0.4 MG/DL (ref 0–1.2)
BUN SERPL-MCNC: 26 MG/DL (ref 8–23)
BUN/CREAT SERPL: 25.5 (ref 7–25)
CALCIUM SPEC-SCNC: 9.6 MG/DL (ref 8.6–10.5)
CHLORIDE SERPL-SCNC: 106 MMOL/L (ref 98–107)
CHOLEST SERPL-MCNC: 134 MG/DL (ref 0–200)
CO2 SERPL-SCNC: 26.8 MMOL/L (ref 22–29)
CREAT SERPL-MCNC: 1.02 MG/DL (ref 0.57–1)
EGFRCR SERPLBLD CKD-EPI 2021: 53.7 ML/MIN/1.73
GLOBULIN UR ELPH-MCNC: 2.3 GM/DL
GLUCOSE SERPL-MCNC: 106 MG/DL (ref 65–99)
HBA1C MFR BLD: 5.1 % (ref 4.8–5.6)
HDLC SERPL-MCNC: 54 MG/DL (ref 40–60)
LDLC SERPL CALC-MCNC: 52 MG/DL (ref 0–100)
LDLC/HDLC SERPL: 0.86 {RATIO}
POTASSIUM SERPL-SCNC: 4.4 MMOL/L (ref 3.5–5.2)
PROT SERPL-MCNC: 6.5 G/DL (ref 6–8.5)
SODIUM SERPL-SCNC: 142 MMOL/L (ref 136–145)
TRIGL SERPL-MCNC: 168 MG/DL (ref 0–150)
VLDLC SERPL-MCNC: 28 MG/DL (ref 5–40)

## 2024-08-12 PROCEDURE — 83036 HEMOGLOBIN GLYCOSYLATED A1C: CPT | Performed by: FAMILY MEDICINE

## 2024-08-12 PROCEDURE — 80061 LIPID PANEL: CPT | Performed by: FAMILY MEDICINE

## 2024-08-12 PROCEDURE — 80053 COMPREHEN METABOLIC PANEL: CPT | Performed by: FAMILY MEDICINE

## 2024-08-13 ENCOUNTER — TELEPHONE (OUTPATIENT)
Dept: FAMILY MEDICINE CLINIC | Facility: CLINIC | Age: 89
End: 2024-08-13
Payer: MEDICARE

## 2024-08-13 NOTE — TELEPHONE ENCOUNTER
Caller: Kristine Rahman    Relationship: Self    Best call back number: [phone]     What is the best time to reach you: ANY    Who are you requesting to speak with (clinical staff, provider,  specific staff member): CLINICAL STAFF    What was the call regarding: PATIENT CALLED STATING THAT SHE WOULD LIKE TO SPEAK TO A NURSE REGARDING HER ELIQUIS. SHE WAS PRESCRIBED THE MEDICATION FORM ANOTHER PROVIDER AND COULDN'T REMEMBER IF DR BOWENS WANTED THERE TO CONTINUE TAKING IT.

## 2024-08-19 RX ORDER — METOPROLOL TARTRATE 50 MG/1
50 TABLET, FILM COATED ORAL 2 TIMES DAILY
Qty: 180 TABLET | Refills: 1 | Status: SHIPPED | OUTPATIENT
Start: 2024-08-19

## 2024-08-19 RX ORDER — BENAZEPRIL HYDROCHLORIDE 40 MG/1
40 TABLET ORAL DAILY
Qty: 90 TABLET | Refills: 3 | Status: SHIPPED | OUTPATIENT
Start: 2024-08-19

## 2024-08-19 RX ORDER — AMLODIPINE BESYLATE 10 MG/1
10 TABLET ORAL DAILY
Qty: 90 TABLET | Refills: 1 | Status: SHIPPED | OUTPATIENT
Start: 2024-08-19 | End: 2024-08-19 | Stop reason: SDUPTHER

## 2024-08-19 RX ORDER — HYDRALAZINE HYDROCHLORIDE 50 MG/1
50 TABLET, FILM COATED ORAL 3 TIMES DAILY
Qty: 270 TABLET | Refills: 1 | Status: SHIPPED | OUTPATIENT
Start: 2024-08-19

## 2024-08-19 RX ORDER — AMLODIPINE BESYLATE 10 MG/1
10 TABLET ORAL DAILY
Qty: 90 TABLET | Refills: 1 | Status: SHIPPED | OUTPATIENT
Start: 2024-08-19

## 2024-08-19 NOTE — TELEPHONE ENCOUNTER
Caller: TRISTON PARTIDA    Relationship: Emergency Contact    Best call back number: [phone]    Requested Prescriptions:   Requested Prescriptions     Pending Prescriptions Disp Refills    apixaban (ELIQUIS) 5 MG tablet tablet 60 tablet 12     Sig: Take 1 tablet by mouth 2 (Two) Times a Day. Indications: Atrial Fibrillation    metoprolol tartrate (LOPRESSOR) 50 MG tablet 180 tablet 1     Sig: Take 1 tablet by mouth 2 (Two) Times a Day.    benazepril (LOTENSIN) 40 MG tablet 90 tablet 3     Sig: Take 1 tablet by mouth Daily.    amLODIPine (NORVASC) 10 MG tablet 90 tablet 1     Sig: Take 1 tablet by mouth Daily.    hydrALAZINE (APRESOLINE) 50 MG tablet 270 tablet 1     Sig: Take 1 tablet by mouth 3 (Three) Times a Day.        Pharmacy where request should be sent: [facility], [address] [phone] [place] [phone] FX     Last office visit with prescribing clinician: 8/6/2024   Last telemedicine visit with prescribing clinician: Visit date not found   Next office visit with prescribing clinician: 12/5/2024     Does the patient have less than a 3 day supply:  [] Yes  [x] No      Vivek Varela Rep   08/19/24 10:42 EDT        CALLER WAS NOT LISTED ON CURRENT [facility] VERBAL

## 2024-08-28 NOTE — PROGRESS NOTES
"GYN Problem/Follow Up Visit    Chief Complaint   Patient presents with    Follow-up     lichen           HPI  Kristine Rahman is a 88 y.o. female, , who presents for follow up after starting treatment for vulvar itch and irritation, suspected lichen sclerosus, used steroid topical to vulva for 1 month, symptoms abated, occasionally with .   Experience mild vulvar itch which resolves with application of topical steroid      Additional OB/GYN History   No LMP recorded. Patient is postmenopausal.  Current contraception: contraceptive methods: Post menopausal status    Past Medical History:   Diagnosis Date    Anxiety     Arthritis     Asthma     Basal cell carcinoma     on nose    Crohn's disease     Depression     Diverticulitis     Gastric ulcer     Hammer toe     Lichen sclerosus       Past Surgical History:   Procedure Laterality Date    CHOLECYSTECTOMY      COLONOSCOPY      HEMORRHOIDECTOMY        Family History   Problem Relation Age of Onset    Hypertension Son     Diabetes Daughter     Hypertension Daughter     Colon cancer Cousin     Breast cancer Neg Hx     Ovarian cancer Neg Hx     Uterine cancer Neg Hx     Prostate cancer Neg Hx      Allergies as of 2024 - Reviewed 2024   Allergen Reaction Noted    Latex Unknown - Low Severity 2021      The additional following portions of the patient's history were reviewed and updated as appropriate: allergies, current medications, past family history, past medical history, past social history, past surgical history, and problem list.    Review of Systems    See HPI for pertinent ROS    Objective   /63   Pulse 60   Ht 156.2 cm (61.5\")   Wt 69.9 kg (154 lb)   BMI 28.63 kg/m²     Physical Exam  Vitals and nursing note reviewed. Exam conducted with a chaperone present.   Constitutional:       Appearance: Normal appearance.   Cardiovascular:      Rate and Rhythm: Normal rate.   Pulmonary:      Effort: Pulmonary effort is normal. "   Genitourinary:     General: Normal vulva.      Labia:         Right: No rash, tenderness or lesion.         Left: No rash, tenderness or lesion.       Comments: Atrophy of the labia, Mild hypopigmentation of perineum, no lesion, no erythema  Lymphadenopathy:      Lower Body: No right inguinal adenopathy. No left inguinal adenopathy.   Skin:     General: Skin is warm and dry.   Neurological:      Mental Status: She is alert and oriented to person, place, and time.            Assessment and Plan    Diagnoses and all orders for this visit:    1. Lichen sclerosus  -     clobetasol (Temovate) 0.05 % ointment; Apply 1 Application topically to the appropriate area as directed 2 (Two) Times a Day. APPLY THIN LAYER TO AFFECTED AREA TWICE WEEKLY prn  Dispense: 45 g; Refill: 2        Counseling:  Counseled regarding management lichen sclerosus. Symptoms have resolved. Good response with the use of topical steroid, she will continue use prn, she will follow up if her symptoms persist or worsen and anually    Follow Up:  Return in about 1 year (around 9/9/2025).        Terrie Tolbert, APRN  09/09/2024

## 2024-09-09 ENCOUNTER — TELEPHONE (OUTPATIENT)
Dept: OBSTETRICS AND GYNECOLOGY | Facility: CLINIC | Age: 89
End: 2024-09-09

## 2024-09-09 ENCOUNTER — OFFICE VISIT (OUTPATIENT)
Dept: OBSTETRICS AND GYNECOLOGY | Facility: CLINIC | Age: 89
End: 2024-09-09
Payer: MEDICARE

## 2024-09-09 ENCOUNTER — DOCUMENTATION (OUTPATIENT)
Dept: PHYSICAL THERAPY | Facility: CLINIC | Age: 89
End: 2024-09-09
Payer: MEDICARE

## 2024-09-09 VITALS
WEIGHT: 154 LBS | SYSTOLIC BLOOD PRESSURE: 148 MMHG | HEIGHT: 62 IN | BODY MASS INDEX: 28.34 KG/M2 | HEART RATE: 60 BPM | DIASTOLIC BLOOD PRESSURE: 63 MMHG

## 2024-09-09 DIAGNOSIS — L90.0 LICHEN SCLEROSUS: ICD-10-CM

## 2024-09-09 PROCEDURE — 99459 PELVIC EXAMINATION: CPT | Performed by: NURSE PRACTITIONER

## 2024-09-09 PROCEDURE — 1159F MED LIST DOCD IN RCRD: CPT | Performed by: NURSE PRACTITIONER

## 2024-09-09 PROCEDURE — 99213 OFFICE O/P EST LOW 20 MIN: CPT | Performed by: NURSE PRACTITIONER

## 2024-09-09 PROCEDURE — 1160F RVW MEDS BY RX/DR IN RCRD: CPT | Performed by: NURSE PRACTITIONER

## 2024-09-09 RX ORDER — CLOBETASOL PROPIONATE 0.5 MG/G
1 OINTMENT TOPICAL 2 TIMES DAILY
Qty: 45 G | Refills: 2 | Status: SHIPPED | OUTPATIENT
Start: 2024-09-09 | End: 2024-09-09

## 2024-09-09 RX ORDER — CLOBETASOL PROPIONATE 0.5 MG/G
1 OINTMENT TOPICAL 2 TIMES WEEKLY
Qty: 45 G | Refills: 2 | Status: SHIPPED | OUTPATIENT
Start: 2024-09-09

## 2024-10-15 ENCOUNTER — HOSPITAL ENCOUNTER (OUTPATIENT)
Dept: CT IMAGING | Facility: HOSPITAL | Age: 89
Discharge: HOME | End: 2024-10-15
Admitting: FAMILY MEDICINE
Payer: MEDICARE

## 2024-10-15 DIAGNOSIS — R91.1 LUNG NODULE: ICD-10-CM

## 2024-10-15 PROCEDURE — 71250 CT THORAX DX C-: CPT

## 2024-10-17 DIAGNOSIS — E04.1 THYROID NODULE: Primary | ICD-10-CM

## 2024-10-28 ENCOUNTER — HOSPITAL ENCOUNTER (OUTPATIENT)
Dept: ULTRASOUND IMAGING | Facility: HOSPITAL | Age: 89
Discharge: HOME | End: 2024-10-28
Admitting: FAMILY MEDICINE
Payer: MEDICARE

## 2024-10-28 PROCEDURE — 76536 US EXAM OF HEAD AND NECK: CPT

## 2024-10-30 ENCOUNTER — TELEPHONE (OUTPATIENT)
Dept: FAMILY MEDICINE CLINIC | Facility: CLINIC | Age: 89
End: 2024-10-30
Payer: MEDICARE

## 2024-10-30 NOTE — TELEPHONE ENCOUNTER
"Relay     \"  Her thyroid ultrasound is abnormal.  Radiology recommends a biopsy.  If she is willing to do this she needs a ultrasound-guided thyroid biopsy via interventional radiology. \"                "

## 2024-11-01 NOTE — TELEPHONE ENCOUNTER
Name: [place]    Relationship: Self    Best Callback Number: [phone]     HUB PROVIDED THE RELAY MESSAGE FROM THE OFFICE   PATIENT [phone]     ADDITIONAL INFORMATION: PATIENT WOULD LIKE TO GET MORE INFORMATION ABOUT THE RECOMMENDED BIOPSY

## 2024-11-04 NOTE — TELEPHONE ENCOUNTER
Attempted to reach pt, left    HUB TO RELAY:     I am not sure what type of information she needs.  We could discuss this more she wants to make a follow-up appointment.

## 2024-11-04 NOTE — TELEPHONE ENCOUNTER
Name: [place] SHARIF    Relationship: Self    Best Callback Number: [phone]    HUB PROVIDED THE RELAY MESSAGE FROM THE OFFICE   PATIENT VOICED UNDERSTANDING AND HAS NO FURTHER QUESTIONS AT THIS TIME

## 2024-11-22 ENCOUNTER — OFFICE VISIT (OUTPATIENT)
Dept: FAMILY MEDICINE CLINIC | Facility: CLINIC | Age: 89
End: 2024-11-22
Payer: MEDICARE

## 2024-11-22 VITALS
HEIGHT: 62 IN | BODY MASS INDEX: 28.52 KG/M2 | DIASTOLIC BLOOD PRESSURE: 67 MMHG | WEIGHT: 155 LBS | OXYGEN SATURATION: 93 % | SYSTOLIC BLOOD PRESSURE: 124 MMHG | TEMPERATURE: 98 F | HEART RATE: 67 BPM

## 2024-11-22 DIAGNOSIS — Z23 NEED FOR INFLUENZA VACCINATION: Primary | ICD-10-CM

## 2024-11-22 DIAGNOSIS — E04.1 THYROID NODULE: ICD-10-CM

## 2024-11-22 DIAGNOSIS — Z23 NEED FOR COVID-19 VACCINE: ICD-10-CM

## 2024-11-22 NOTE — ASSESSMENT & PLAN NOTE
On her recent ultrasound showed that one of her nodules was slightly larger than prior.  Radiology recommended a biopsy.  Will go ahead and get that done.

## 2024-11-22 NOTE — PROGRESS NOTES
Chief Complaint   Patient presents with    discuss US         Subjective     Kristine Rahman  has a past medical history of Anxiety, Arthritis, Asthma, Basal cell carcinoma, Crohn's disease, Depression, Diverticulitis, Gastric ulcer, Hammer toe, and Lichen sclerosus.    Thyroid nodule-she had a recent CTA of her chest that showed a thyroid nodule.  This was followed up with an ultrasound of her thyroid to assess it better.  She had 2 complex nodules.  The 1 appears to be unchanged from previous exam.  The second nodule also appeared to be slightly larger concerning enough to recommend a biopsy.        PHQ-2 Depression Screening  Little interest or pleasure in doing things?     Feeling down, depressed, or hopeless?     PHQ-2 Total Score     PHQ-9 Depression Screening  Little interest or pleasure in doing things?     Feeling down, depressed, or hopeless?     Trouble falling or staying asleep, or sleeping too much?     Feeling tired or having little energy?     Poor appetite or overeating?     Feeling bad about yourself - or that you are a failure or have let yourself or your family down?     Trouble concentrating on things, such as reading the newspaper or watching television?     Moving or speaking so slowly that other people could have noticed? Or the opposite - being so fidgety or restless that you have been moving around a lot more than usual?     Thoughts that you would be better off dead, or of hurting yourself in some way?     PHQ-9 Total Score     If you checked off any problems, how difficult have these problems made it for you to do your work, take care of things at home, or get along with other people?       Allergies   Allergen Reactions    Latex Unknown - Low Severity       Prior to Admission medications    Medication Sig Start Date End Date Taking? Authorizing Provider   amLODIPine (NORVASC) 10 MG tablet Take 1 tablet by mouth Daily. 8/19/24  Yes Sameer Garsia, DO   apixaban (ELIQUIS) 5 MG  tablet tablet Take 1 tablet by mouth 2 (Two) Times a Day. Indications: Atrial Fibrillation 8/19/24  Yes Sameer Garsia DO   benazepril (LOTENSIN) 40 MG tablet Take 1 tablet by mouth Daily. 8/19/24  Yes Sameer Garsia DO   Calcium Carbonate-Vit D-Min (CALCIUM 1200 PO) Take 1,200 mg by mouth Daily.   Yes Vane Greenberg MD   chlorthalidone (HYGROTON) 25 MG tablet Take 1 tablet by mouth Daily. 2/5/24  Yes Sameer Garsia DO   clobetasol (Temovate) 0.05 % ointment Apply 1 Application topically to the appropriate area as directed 2 (Two) Times a Week. 9/9/24  Yes Terrie Tolbert APRN   clopidogrel (PLAVIX) 75 MG tablet Take 1 tablet by mouth Daily. 2/5/24  Yes Sameer Garsia DO   hydrALAZINE (APRESOLINE) 50 MG tablet Take 1 tablet by mouth 3 (Three) Times a Day. 8/19/24  Yes Sameer Garsia DO   metoprolol tartrate (LOPRESSOR) 50 MG tablet Take 1 tablet by mouth 2 (Two) Times a Day. 8/19/24  Yes Sameer Garsia DO   multivitamin with minerals (CENTRUM SILVER PO) Take 1 tablet by mouth Daily.   Yes Vane Greenberg MD   pravastatin (PRAVACHOL) 20 MG tablet Take 1 tablet by mouth Every Night. 2/5/24  Yes Sameer Garsia DO   sertraline (ZOLOFT) 100 MG tablet Take 1 tablet by mouth Daily.   Yes Vane Greenberg MD        Patient Active Problem List   Diagnosis    Anxiety    Arthritis    Asthma    Colon polyps    Crohn's disease    Depression    Pre-diabetes    Diverticulitis    Essential hypertension    Gastric ulcer    Hyperlipemia    Seasonal allergic rhinitis    Shortness of breath    Ulcerative lesion    Urinary incontinence without sensory awareness    COVID-19 virus detected    Lesion of skin of nose    Thyroid nodule    Medicare annual wellness visit, subsequent    Need for tetanus, diphtheria, and acellular pertussis (Tdap) vaccine    Need for shingles vaccine    Post-menopausal    Acute hypoxic respiratory failure    Longstanding  "persistent atrial fibrillation    Lung nodule        Past Surgical History:   Procedure Laterality Date    CHOLECYSTECTOMY      COLONOSCOPY      HEMORRHOIDECTOMY         Social History     Socioeconomic History    Marital status:     Number of children: 3   Tobacco Use    Smoking status: Never     Passive exposure: Never    Smokeless tobacco: Never   Vaping Use    Vaping status: Never Used   Substance and Sexual Activity    Alcohol use: Not Currently    Drug use: Never    Sexual activity: Not Currently     Birth control/protection: Post-menopausal       Family History   Problem Relation Age of Onset    Hypertension Son     Diabetes Daughter     Hypertension Daughter     Colon cancer Cousin     Breast cancer Neg Hx     Ovarian cancer Neg Hx     Uterine cancer Neg Hx     Prostate cancer Neg Hx        Family history, surgical history, past medical history, Allergies and meds reviewed with patient today and updated in [employer] EMR.     ROS:  Review of Systems   Constitutional:  Positive for fatigue.   Gastrointestinal:  Negative for constipation and diarrhea.   Neurological:  Negative for headache.       OBJECTIVE:  Vitals:    11/22/24 1402   BP: 124/67   BP Location: Left arm   Patient Position: Sitting   Pulse: 67   Temp: 98 °F (36.7 °C)   SpO2: 93%   Weight: 70.3 kg (155 lb)   Height: 156.2 cm (61.5\")     No results found.   Body mass index is 28.81 kg/m².  No LMP recorded. Patient is postmenopausal.    The ASCVD Risk score (Kyler DK, et al., 2019) failed to calculate for the following reasons:    The 2019 ASCVD risk score is only valid for ages 40 to 79     Physical Exam  Vitals and nursing note reviewed.   Constitutional:       General: She is not in acute distress.     Appearance: Normal appearance. She is normal weight.   HENT:      Head: Normocephalic.   Neck:      Thyroid: No thyroid mass, thyromegaly or thyroid tenderness.   Musculoskeletal:      Cervical back: Neck supple.   Neurological:      Mental " Status: She is alert.         Procedures    No visits with results within 30 Day(s) from this visit.   Latest known visit with results is:   Office Visit on 08/06/2024   Component Date Value Ref Range Status    Glucose 08/12/2024 106 (H)  65 - 99 mg/dL Final    BUN 08/12/2024 26 (H)  8 - 23 mg/dL Final    Creatinine 08/12/2024 1.02 (H)  0.57 - 1.00 mg/dL Final    Sodium 08/12/2024 142  136 - 145 mmol/L Final    Potassium 08/12/2024 4.4  3.5 - 5.2 mmol/L Final    Chloride 08/12/2024 106  98 - 107 mmol/L Final    CO2 08/12/2024 26.8  22.0 - 29.0 mmol/L Final    Calcium 08/12/2024 9.6  8.6 - 10.5 mg/dL Final    Total Protein 08/12/2024 6.5  6.0 - 8.5 g/dL Final    Albumin 08/12/2024 4.2  3.5 - 5.2 g/dL Final    ALT (SGPT) 08/12/2024 17  1 - 33 U/L Final    AST (SGOT) 08/12/2024 23  1 - 32 U/L Final    Alkaline Phosphatase 08/12/2024 99  39 - 117 U/L Final    Total Bilirubin 08/12/2024 0.4  0.0 - 1.2 mg/dL Final    Globulin 08/12/2024 2.3  gm/dL Final    A/G Ratio 08/12/2024 1.8  g/dL Final    BUN/Creatinine Ratio 08/12/2024 25.5 (H)  7.0 - 25.0 Final    Anion Gap 08/12/2024 9.2  5.0 - 15.0 mmol/L Final    eGFR 08/12/2024 53.7 (L)  >60.0 mL/min/1.73 Final    Total Cholesterol 08/12/2024 134  0 - 200 mg/dL Final    Triglycerides 08/12/2024 168 (H)  0 - 150 mg/dL Final    HDL Cholesterol 08/12/2024 54  40 - 60 mg/dL Final    LDL Cholesterol  08/12/2024 52  0 - 100 mg/dL Final    VLDL Cholesterol 08/12/2024 28  5 - 40 mg/dL Final    LDL/HDL Ratio 08/12/2024 0.86   Final    Hemoglobin A1C 08/12/2024 5.10  4.80 - 5.60 % Final       ASSESSMENT/ PLAN:    Diagnoses and all orders for this visit:    1. Need for influenza vaccination (Primary)  -     Fluzone High-Dose 65+yrs ([id number])    2. Need for COVID-19 vaccine  -     COVID-19 (Pfizer) 12yrs+ (COMIRNATY)    3. Thyroid nodule  Assessment & Plan:  On her recent ultrasound showed that one of her nodules was slightly larger than prior.  Radiology recommended a biopsy.  Will go  ahead and get that done.                 Orders Placed Today:     No orders of the defined types were placed in this encounter.       Management Plan:     An After Visit Summary was printed and given to the patient at discharge.    Follow-up: Return for Recheck, Next scheduled follow up.    Sameer Garsia,  11/22/2024 14:34 EST  This note was electronically signed.

## 2024-12-16 ENCOUNTER — HOSPITAL ENCOUNTER (OUTPATIENT)
Dept: ULTRASOUND IMAGING | Facility: HOSPITAL | Age: 89
Discharge: HOME | End: 2024-12-16
Admitting: FAMILY MEDICINE
Payer: MEDICARE

## 2024-12-16 DIAGNOSIS — E04.1 THYROID NODULE: ICD-10-CM

## 2024-12-16 PROCEDURE — 76536 US EXAM OF HEAD AND NECK: CPT

## 2024-12-16 RX ORDER — LIDOCAINE HYDROCHLORIDE 10 MG/ML
INJECTION, SOLUTION INFILTRATION; PERINEURAL
Status: DISPENSED
Start: 2024-12-16 | End: 2024-12-16

## 2024-12-19 ENCOUNTER — OFFICE VISIT (OUTPATIENT)
Dept: FAMILY MEDICINE CLINIC | Facility: CLINIC | Age: 89
End: 2024-12-19
Payer: MEDICARE

## 2024-12-19 VITALS
OXYGEN SATURATION: 92 % | HEIGHT: 62 IN | DIASTOLIC BLOOD PRESSURE: 65 MMHG | TEMPERATURE: 97.7 F | WEIGHT: 155 LBS | SYSTOLIC BLOOD PRESSURE: 122 MMHG | BODY MASS INDEX: 28.52 KG/M2 | HEART RATE: 69 BPM

## 2024-12-19 DIAGNOSIS — E78.00 PURE HYPERCHOLESTEROLEMIA: ICD-10-CM

## 2024-12-19 DIAGNOSIS — E04.1 THYROID NODULE: ICD-10-CM

## 2024-12-19 DIAGNOSIS — I10 ESSENTIAL HYPERTENSION: ICD-10-CM

## 2024-12-19 DIAGNOSIS — Z00.00 MEDICARE ANNUAL WELLNESS VISIT, SUBSEQUENT: Primary | ICD-10-CM

## 2024-12-19 DIAGNOSIS — I48.11 LONGSTANDING PERSISTENT ATRIAL FIBRILLATION: ICD-10-CM

## 2024-12-19 PROBLEM — R73.03 PRE-DIABETES: Status: RESOLVED | Noted: 2021-07-09 | Resolved: 2024-12-19

## 2024-12-19 NOTE — ASSESSMENT & PLAN NOTE
Clinically she sounds are regular here today.  Will continue her metoprolol for rate control and her Eliquis for stroke prevention.

## 2024-12-19 NOTE — PROGRESS NOTES
Subjective   The ABCs of the Annual Wellness Visit  Medicare Wellness Visit      Kristine Rahman is a 89 y.o. patient who presents for a Medicare Wellness Visit.    The following portions of the patient's history were reviewed and   updated as appropriate: allergies, current medications, past family history, past medical history, past social history, past surgical history, and problem list.    Compared to one year ago, the patient's physical   health is the same.  Compared to one year ago, the patient's mental   health is worse.    Recent Hospitalizations:  This patient has had a [facility] admission record on file within the last 365 days.  Current Medical Providers:  Patient Care Team:  Sameer Garsia DO as PCP - General (Family Medicine)  Judith Escalante APRN as Nurse Practitioner (Nurse Practitioner)  Lemuel Pagan MD as Consulting Physician (Urology)  Terrie Tolbert APRN as Nurse Practitioner (Obstetrics and Gynecology)    Outpatient Medications Prior to Visit   Medication Sig Dispense Refill    amLODIPine (NORVASC) 10 MG tablet Take 1 tablet by mouth Daily. 90 tablet 1    apixaban (ELIQUIS) 5 MG tablet tablet Take 1 tablet by mouth 2 (Two) Times a Day. Indications: Atrial Fibrillation 60 tablet 12    benazepril (LOTENSIN) 40 MG tablet Take 1 tablet by mouth Daily. 90 tablet 3    Calcium Carbonate-Vit D-Min (CALCIUM 1200 PO) Take 1,200 mg by mouth Daily.      chlorthalidone (HYGROTON) 25 MG tablet Take 1 tablet by mouth Daily. 90 tablet 3    clobetasol (Temovate) 0.05 % ointment Apply 1 Application topically to the appropriate area as directed 2 (Two) Times a Week. 45 g 2    clopidogrel (PLAVIX) 75 MG tablet Take 1 tablet by mouth Daily. 90 tablet 3    hydrALAZINE (APRESOLINE) 50 MG tablet Take 1 tablet by mouth 3 (Three) Times a Day. 270 tablet 1    metoprolol tartrate (LOPRESSOR) 50 MG tablet Take 1 tablet by mouth 2 (Two) Times a Day. 180 tablet 1    multivitamin with  "minerals (CENTRUM SILVER PO) Take 1 tablet by mouth Daily.      pravastatin (PRAVACHOL) 20 MG tablet Take 1 tablet by mouth Every Night. 90 tablet 3    sertraline (ZOLOFT) 100 MG tablet Take 1 tablet by mouth Daily.       No facility-administered medications prior to visit.     No opioid medication identified on active medication list. I have reviewed chart for other potential  high risk medication/s and harmful drug interactions in the elderly.      Aspirin is not on active medication list.  Aspirin use is contraindicated for this patient due to: current use of Eliquis.  .    Patient Active Problem List   Diagnosis    Anxiety    Arthritis    Asthma    Colon polyps    Crohn's disease    Depression    Diverticulitis    Essential hypertension    Gastric ulcer    Hyperlipemia    Seasonal allergic rhinitis    Shortness of breath    Ulcerative lesion    Urinary incontinence without sensory awareness    COVID-19 virus detected    Lesion of skin of nose    Thyroid nodule    Medicare annual wellness visit, subsequent    Need for tetanus, diphtheria, and acellular pertussis (Tdap) vaccine    Need for shingles vaccine    Post-menopausal    Acute hypoxic respiratory failure    Longstanding persistent atrial fibrillation    Lung nodule     Advance Care Planning Advance Directive is on file.  ACP discussion was held with the patient during this visit. Patient has an advance directive in EMR which is still valid.             Objective   Vitals:    12/19/24 1533   BP: 122/65   BP Location: Left arm   Patient Position: Sitting   Pulse: 69   Temp: 97.7 °F (36.5 °C)   SpO2: 92%   Weight: 70.3 kg (155 lb)   Height: 156.2 cm (61.5\")       Estimated body mass index is 28.81 kg/m² as calculated from the following:    Height as of this encounter: 156.2 cm (61.5\").    Weight as of this encounter: 70.3 kg (155 lb).                Does the patient have evidence of cognitive impairment? Yes                                                      "                                           Health  Risk Assessment    Smoking Status:  Social History     Tobacco Use   Smoking Status Never    Passive exposure: Never   Smokeless Tobacco Never     Alcohol Consumption:  Social History     Substance and Sexual Activity   Alcohol Use Not Currently       Fall Risk Screen  STEADI Fall Risk Assessment was completed, and patient is at LOW risk for falls.Assessment completed on:2024    Depression Screening   Little interest or pleasure in doing things? Not at all   Feeling down, depressed, or hopeless? Not at all   PHQ-2 Total Score 0      Health Habits and Functional and Cognitive Screenin/19/2024     3:35 PM   Functional & Cognitive Status   Do you have difficulty preparing food and eating? Yes   Do you have difficulty bathing yourself, getting dressed or grooming yourself? No   Do you have difficulty using the toilet? No   Do you have difficulty moving around from place to place? No   Do you have trouble with steps or getting out of a bed or a chair? No   Current Diet Well Balanced Diet   Dental Exam Up to date   Eye Exam Up to date   Exercise (times per week) 0 times per week   Current Exercises Include No Regular Exercise   Do you need help using the phone?  No   Are you deaf or do you have serious difficulty hearing?  No   Do you need help to go to places out of walking distance? No   Do you need help shopping? No   Do you need help preparing meals?  No   Do you need help with housework?  No   Do you need help with laundry? No   Do you need help taking your medications? No   Do you need help managing money? No   Do you ever drive or ride in a car without wearing a seat belt? No   Have you felt unusual stress, anger or loneliness in the last month? Yes   Who do you live with? Alone   If you need help, do you have trouble finding someone available to you? No   Have you been bothered in the last four weeks by sexual problems? No   Do you have difficulty  concentrating, remembering or making decisions? Yes           Age-appropriate Screening Schedule:  Refer to the list below for future screening recommendations based on patient's age, sex and/or medical conditions. Orders for these recommended tests are listed in the plan section. The patient has been provided with a written plan.    Health Maintenance List  Health Maintenance   Topic Date Due    RSV Vaccine - Adults (1 - 1-dose 75+ series) 11/22/2025 (Originally 10/24/2012)    BMI FOLLOWUP  04/09/2025    LIPID PANEL  08/12/2025    ANNUAL WELLNESS VISIT  12/19/2025    DXA SCAN  07/01/2026    TDAP/TD VACCINES (3 - Td or Tdap) 05/21/2033    COVID-19 Vaccine  Completed    INFLUENZA VACCINE  Completed    ZOSTER VACCINE  Completed    Pneumococcal Vaccine 65+  Discontinued    HEMOGLOBIN A1C  Discontinued    URINE MICROALBUMIN  Discontinued                                                                                                                                                CMS Preventative Services Quick Reference  Risk Factors Identified During Encounter  None Identified    The above risks/problems have been discussed with the patient.  Pertinent information has been shared with the patient in the After Visit Summary.  An After Visit Summary and PPPS were made available to the patient.    Follow Up:   Next Medicare Wellness visit to be scheduled in 1 year.          Additional E&M Note during same encounter follows:  Patient has multiple medical problems which are significant and separately identifiable that require additional work above and beyond the Medicare Wellness Visit.      Chief Complaint  Medicare Wellness-subsequent    Kristine Rahman is a 89 y.o. female who presents to [facility]     Atrial fibrillation-she denies any palpitations or tachycardia.  She is rate controlled with metoprolol and stroke prevention with Eliquis twice daily.    Hypertension-she does not check  "her blood pressure outside the office.  It is good here today at 122/65.    Hyperlipidemia-she takes her pravastatin on a nightly basis.    Review of Systems   Constitutional:  Positive for fatigue.   Eyes:  Negative for visual disturbance.   Respiratory:  Positive for cough. Negative for chest tightness, shortness of breath and wheezing.    Cardiovascular:  Negative for chest pain and palpitations.   Neurological:  Negative for headaches.        (+) memory issues   Psychiatric/Behavioral:  The patient is not nervous/anxious.        Objective   Vital Signs:   Vitals:    12/19/24 1533   BP: 122/65   BP Location: Left arm   Patient Position: Sitting   Pulse: 69   Temp: 97.7 °F (36.5 °C)   SpO2: 92%   Weight: 70.3 kg (155 lb)   Height: 156.2 cm (61.5\")       Wt Readings from Last 3 Encounters:   12/19/24 70.3 kg (155 lb)   11/22/24 70.3 kg (155 lb)   09/09/24 69.9 kg (154 lb)     BP Readings from Last 3 Encounters:   12/19/24 122/65   11/22/24 124/67   09/09/24 148/63       Physical Exam  Vitals and nursing note reviewed.   Constitutional:       General: She is not in acute distress.     Appearance: Normal appearance. She is normal weight.   HENT:      Head: Normocephalic.      Right Ear: Tympanic membrane, ear canal and external ear normal.      Left Ear: Tympanic membrane, ear canal and external ear normal.      Nose: Nose normal.      Mouth/Throat:      Mouth: Mucous membranes are moist.      Pharynx: Oropharynx is clear.   Eyes:      General: No scleral icterus.     Conjunctiva/sclera: Conjunctivae normal.      Pupils: Pupils are equal, round, and reactive to light.   Cardiovascular:      Rate and Rhythm: Normal rate and regular rhythm.      Pulses: Normal pulses.      Heart sounds: Normal heart sounds. No murmur heard.  Pulmonary:      Effort: Pulmonary effort is normal.      Breath sounds: Normal breath sounds. No wheezing, rhonchi or rales.   Musculoskeletal:      Cervical back: Neck supple. No rigidity or " tenderness.   Lymphadenopathy:      Cervical: No cervical adenopathy.   Skin:     General: Skin is warm and dry.      Coloration: Skin is not jaundiced.      Findings: No rash.   Neurological:      General: No focal deficit present.      Mental Status: She is alert and oriented to person, place, and time.   Psychiatric:         Mood and Affect: Mood normal.         Thought Content: Thought content normal.         Judgment: Judgment normal.         The following data was reviewed by Sameer Garsia DO on 12/19/2024  CMP   CMP          7/15/2024    05:55 7/16/2024    05:49 8/12/2024    14:32   CMP   Glucose 91  98  106    BUN 14  15  26    Creatinine 0.76  0.68  1.02    EGFR 76.4  84.9  53.7    Sodium 138  141  142    Potassium 3.8  4.1  4.4    Chloride 106  107  106    Calcium 8.8  9.2  9.6    Total Protein   6.5    Albumin   4.2    Globulin   2.3    Total Bilirubin   0.4    Alkaline Phosphatase   99    AST (SGOT)   23    ALT (SGPT)   17    Albumin/Globulin Ratio   1.8    BUN/Creatinine Ratio 18.4  22.1  25.5    Anion Gap 6.6  6.7  9.2      LIPID   Lipid Panel          2/2/2024    12:14 6/4/2024    12:04 8/12/2024    14:32   Lipid Panel   Total Cholesterol 149  139  134    Triglycerides 126  90  168    HDL Cholesterol 58  58  54    VLDL Cholesterol 22  17  28    LDL Cholesterol  69  64  52    LDL/HDL Ratio 1.13  1.09  0.86      A1C   Most Recent A1C          8/12/2024    14:32   HGBA1C Most Recent   Hemoglobin A1C 5.10          Assessment & Plan   Diagnoses and all orders for this visit:    1. Medicare annual wellness visit, subsequent (Primary)  Assessment & Plan:  Overall for 89 she is really doing well.  Her daughter has noticed some mild cognitive difficulties but nothing that really affects her day-to-day life.  She is still able to take care of her house cook her meals and even continues to drive without difficulty.  We did caution him to observe for further cognitive decline and intervene.  At this  time we encouraged maybe not to drive in high traffic areas such as in town and have a family member orchestrate that and once a week.      2. Longstanding persistent atrial fibrillation  Assessment & Plan:  Clinically she sounds are regular here today.  Will continue her metoprolol for rate control and her Eliquis for stroke prevention.      3. Pure hypercholesterolemia  Assessment & Plan:   Update her lipids with her labs.    Orders:  -     Comprehensive Metabolic Panel  -     Lipid Panel  -     TSH Rfx On Abnormal To Free T4    4. Essential hypertension  Assessment & Plan:  Her blood pressure is excellent here today.  Will continue her current meds.    Orders:  -     Comprehensive Metabolic Panel  -     Lipid Panel    5. Thyroid nodule  Assessment & Plan:  Her previous ultrasound showed her thyroid nodule had gotten larger.  Thus as a result of that radiology recommended a fine-needle aspiration.  When she went back to have this done the screening ultrasound actually showed this now to be notably smaller in size and due to that radiology deferred biopsy.  Will just repeat her ultrasound in another 6 months.    Orders:  -     TSH Rfx On Abnormal To Free T4                  FOLLOW UP  Return in about 6 months (around 6/19/2025) for Recheck.  Patient was given instructions and counseling regarding her condition or for health maintenance advice. Please see specific information pulled into the AVS if appropriate.     Sameer Garsia, DO  12/19/24  16:20 EST

## 2024-12-19 NOTE — ASSESSMENT & PLAN NOTE
Overall for 89 she is really doing well.  Her daughter has noticed some mild cognitive difficulties but nothing that really affects her day-to-day life.  She is still able to take care of her house cook her meals and even continues to drive without difficulty.  We did caution him to observe for further cognitive decline and intervene.  At this time we encouraged maybe not to drive in high traffic areas such as in town and have a family member orchestrate that and once a week.

## 2024-12-19 NOTE — ASSESSMENT & PLAN NOTE
Her previous ultrasound showed her thyroid nodule had gotten larger.  Thus as a result of that radiology recommended a fine-needle aspiration.  When she went back to have this done the screening ultrasound actually showed this now to be notably smaller in size and due to that radiology deferred biopsy.  Will just repeat her ultrasound in another 6 months.

## 2024-12-23 ENCOUNTER — LAB (OUTPATIENT)
Dept: LAB | Facility: HOSPITAL | Age: 89
End: 2024-12-23
Payer: MEDICARE

## 2024-12-23 LAB
ALBUMIN SERPL-MCNC: 3.9 G/DL (ref 3.5–5.2)
ALBUMIN/GLOB SERPL: 1.5 G/DL
ALP SERPL-CCNC: 90 U/L (ref 39–117)
ALT SERPL W P-5'-P-CCNC: 8 U/L (ref 1–33)
ANION GAP SERPL CALCULATED.3IONS-SCNC: 10.6 MMOL/L (ref 5–15)
AST SERPL-CCNC: 20 U/L (ref 1–32)
BILIRUB SERPL-MCNC: 0.4 MG/DL (ref 0–1.2)
BUN SERPL-MCNC: 22 MG/DL (ref 8–23)
BUN/CREAT SERPL: 20.6 (ref 7–25)
CALCIUM SPEC-SCNC: 9.3 MG/DL (ref 8.6–10.5)
CHLORIDE SERPL-SCNC: 106 MMOL/L (ref 98–107)
CHOLEST SERPL-MCNC: 143 MG/DL (ref 0–200)
CO2 SERPL-SCNC: 27.4 MMOL/L (ref 22–29)
CREAT SERPL-MCNC: 1.07 MG/DL (ref 0.57–1)
EGFRCR SERPLBLD CKD-EPI 2021: 50.4 ML/MIN/1.73
GLOBULIN UR ELPH-MCNC: 2.6 GM/DL
GLUCOSE SERPL-MCNC: 126 MG/DL (ref 65–99)
HDLC SERPL-MCNC: 52 MG/DL (ref 40–60)
LDLC SERPL CALC-MCNC: 74 MG/DL (ref 0–100)
LDLC/HDLC SERPL: 1.39 {RATIO}
POTASSIUM SERPL-SCNC: 3.6 MMOL/L (ref 3.5–5.2)
PROT SERPL-MCNC: 6.5 G/DL (ref 6–8.5)
SODIUM SERPL-SCNC: 144 MMOL/L (ref 136–145)
TRIGL SERPL-MCNC: 93 MG/DL (ref 0–150)
TSH SERPL DL<=0.05 MIU/L-ACNC: 1.32 UIU/ML (ref 0.27–4.2)
VLDLC SERPL-MCNC: 17 MG/DL (ref 5–40)

## 2024-12-23 PROCEDURE — 80061 LIPID PANEL: CPT | Performed by: FAMILY MEDICINE

## 2024-12-23 PROCEDURE — 80053 COMPREHEN METABOLIC PANEL: CPT | Performed by: FAMILY MEDICINE

## 2024-12-23 PROCEDURE — 84443 ASSAY THYROID STIM HORMONE: CPT | Performed by: FAMILY MEDICINE

## 2025-01-07 RX ORDER — HYDRALAZINE HYDROCHLORIDE 50 MG/1
50 TABLET, FILM COATED ORAL 3 TIMES DAILY
Qty: 270 TABLET | Refills: 1 | Status: SHIPPED | OUTPATIENT
Start: 2025-01-07

## 2025-01-29 RX ORDER — CLOPIDOGREL BISULFATE 75 MG/1
75 TABLET ORAL DAILY
Qty: 90 TABLET | Refills: 3 | Status: SHIPPED | OUTPATIENT
Start: 2025-01-29

## 2025-01-30 RX ORDER — PRAVASTATIN SODIUM 20 MG
20 TABLET ORAL NIGHTLY
Qty: 90 TABLET | Refills: 3 | Status: SHIPPED | OUTPATIENT
Start: 2025-01-30

## 2025-01-30 RX ORDER — CHLORTHALIDONE 25 MG/1
25 TABLET ORAL DAILY
Qty: 90 TABLET | Refills: 3 | Status: SHIPPED | OUTPATIENT
Start: 2025-01-30

## 2025-03-13 RX ORDER — SERTRALINE HYDROCHLORIDE 100 MG/1
100 TABLET, FILM COATED ORAL DAILY
Qty: 90 TABLET | Refills: 3 | Status: SHIPPED | OUTPATIENT
Start: 2025-03-13

## 2025-03-24 RX ORDER — METOPROLOL TARTRATE 50 MG
50 TABLET ORAL 2 TIMES DAILY
Qty: 180 TABLET | Refills: 1 | Status: SHIPPED | OUTPATIENT
Start: 2025-03-24

## 2025-04-16 ENCOUNTER — TELEPHONE (OUTPATIENT)
Dept: FAMILY MEDICINE CLINIC | Facility: CLINIC | Age: 89
End: 2025-04-16

## 2025-04-16 NOTE — TELEPHONE ENCOUNTER
Hub staff attempted to follow warm transfer process and was unsuccessful     Caller: [facility], [facility]. - [address] [phone] [facility] [phone]     Relationship to patient: Pharmacy    Best call back number: [phone]    REFERENCE NUMBER: [id number]    Patient is needing: HUMBERTO FROM [facility] STATES THAT THEY NEED TO SPEAK WITH SOMEONE WHO CAN ANSWER CLINICAL QUESTIONS ABOUT THE PATIENT'S     apixaban (ELIQUIS) 5 MG tablet tablet       SHE STATES THAT THE PATIENT HAS REQUESTED AN EXCEPTION.

## 2025-04-18 NOTE — TELEPHONE ENCOUNTER
Spoke to kamla, calling to advise eliquis was denied. Faxing over denial and I will give to celeste to see what recc is

## 2025-04-21 RX ORDER — AMLODIPINE BESYLATE 10 MG/1
10 TABLET ORAL DAILY
Qty: 90 TABLET | Refills: 1 | Status: SHIPPED | OUTPATIENT
Start: 2025-04-21

## 2025-04-21 NOTE — TELEPHONE ENCOUNTER
Caller: TRISTON PARTIDA    Relationship: Emergency Contact    Best call back number: [phone]     Requested Prescriptions:   Requested Prescriptions     Pending Prescriptions Disp Refills    apixaban (ELIQUIS) 5 MG tablet tablet 60 tablet 12     Sig: Take 1 tablet by mouth 2 (Two) Times a Day. Indications: Atrial Fibrillation    amLODIPine (NORVASC) 10 MG tablet 90 tablet 1     Sig: Take 1 tablet by mouth Daily.        [facility], [address] [phone] [facility] [phone] FX  [facility] [id number] - [place] [address] [address] AT [facility] [address] & [facility] [phone] [facility] [phone] FX     Last office visit with prescribing clinician: 12/19/2024   Last telemedicine visit with prescribing clinician: Visit date not found   Next office visit with prescribing clinician: 6/19/2025     Additional details provided by patient: PATIENT WOULD LIKE A 90 DAY SUPPLY    Does the patient have less than a 3 day supply:  [] Yes  [x] No    Would you like a call back once the refill request has been completed: [x] Yes [] No    If the office needs to give you a call back, can they leave a voicemail: [x] Yes [] No    Vivek Nowak Rep   04/21/25 11:53 EDT

## 2025-04-28 ENCOUNTER — TELEPHONE (OUTPATIENT)
Dept: FAMILY MEDICINE CLINIC | Facility: CLINIC | Age: 89
End: 2025-04-28

## 2025-04-28 NOTE — TELEPHONE ENCOUNTER
Caller: TRISTON PARTIDA    Relationship: Emergency Contact    Best call back number:     [phone]     What medication are you requesting: ALTERNATIVE TO MEDICATION LISTED.       apixaban (ELIQUIS) 5 MG tablet tablet     If a prescription is needed, what is your preferred [facility] and phone [facility], [address] [phone]  - [phone] FX     Additional notes: CALLER ADVISED THAT DUE TO CHANGE IN INSURANCE COVERAGE OF MEDICATION LISTED PATIENT IS UNABLE TO AFFORD THE MEDICATION. PATIENT WAS ADVISED THAT IT WOULD COST $400. IF THERE IS A POSSIBLE ALTERNATIVE OR ANY OTHER OPTION THAT MAY BE GIVEN TO ASSIST THE PATIENT PLEASE ADVISE.

## 2025-06-17 ENCOUNTER — TELEPHONE (OUTPATIENT)
Dept: OBSTETRICS AND GYNECOLOGY | Age: 89
End: 2025-06-17
Payer: MEDICARE

## 2025-06-17 NOTE — TELEPHONE ENCOUNTER
Caller: Kristine Rahman    Relationship: Self    Best call back number: [phone]      What orders are you requesting (i.e. lab or imaging): MAMMOGRAM     In what timeframe would the patient need to come in: ASAP     Where will you receive your lab/imaging services: [facility]     Additional notes: PT WOULD LIKE ORDER PLACED SO SHE CAN SCHEDULE HER MAMMOGRAM PLEASE CALL

## 2025-06-19 ENCOUNTER — TRANSCRIBE ORDERS (OUTPATIENT)
Dept: ADMINISTRATIVE | Facility: HOSPITAL | Age: 89
End: 2025-06-19
Payer: MEDICARE

## 2025-06-19 ENCOUNTER — OFFICE VISIT (OUTPATIENT)
Dept: FAMILY MEDICINE CLINIC | Facility: CLINIC | Age: 89
End: 2025-06-19
Payer: MEDICARE

## 2025-06-19 VITALS
SYSTOLIC BLOOD PRESSURE: 190 MMHG | DIASTOLIC BLOOD PRESSURE: 70 MMHG | OXYGEN SATURATION: 90 % | BODY MASS INDEX: 28.71 KG/M2 | HEIGHT: 62 IN | WEIGHT: 156 LBS | TEMPERATURE: 96 F | HEART RATE: 58 BPM

## 2025-06-19 DIAGNOSIS — Z12.31 SCREENING MAMMOGRAM FOR BREAST CANCER: Primary | ICD-10-CM

## 2025-06-19 DIAGNOSIS — E78.00 PURE HYPERCHOLESTEROLEMIA: ICD-10-CM

## 2025-06-19 DIAGNOSIS — R42 LIGHTHEADEDNESS: ICD-10-CM

## 2025-06-19 DIAGNOSIS — I48.11 LONGSTANDING PERSISTENT ATRIAL FIBRILLATION: Primary | ICD-10-CM

## 2025-06-19 DIAGNOSIS — I10 ESSENTIAL HYPERTENSION: ICD-10-CM

## 2025-06-19 LAB
ALBUMIN SERPL-MCNC: 4.3 G/DL (ref 3.5–5.2)
ALBUMIN/GLOB SERPL: 1.5 G/DL
ALP SERPL-CCNC: 103 U/L (ref 39–117)
ALT SERPL W P-5'-P-CCNC: 10 U/L (ref 1–33)
ANION GAP SERPL CALCULATED.3IONS-SCNC: 12.3 MMOL/L (ref 5–15)
AST SERPL-CCNC: 24 U/L (ref 1–32)
BILIRUB SERPL-MCNC: 0.6 MG/DL (ref 0–1.2)
BUN SERPL-MCNC: 35 MG/DL (ref 8–23)
BUN/CREAT SERPL: 25.7 (ref 7–25)
CALCIUM SPEC-SCNC: 10.1 MG/DL (ref 8.6–10.5)
CHLORIDE SERPL-SCNC: 103 MMOL/L (ref 98–107)
CHOLEST SERPL-MCNC: 146 MG/DL (ref 0–200)
CO2 SERPL-SCNC: 25.7 MMOL/L (ref 22–29)
CREAT SERPL-MCNC: 1.36 MG/DL (ref 0.57–1)
DEPRECATED RDW RBC AUTO: 43.4 FL (ref 37–54)
EGFRCR SERPLBLD CKD-EPI 2021: 37.8 ML/MIN/1.73
ERYTHROCYTE [DISTWIDTH] IN BLOOD BY AUTOMATED COUNT: 12.2 % (ref 12.3–15.4)
GLOBULIN UR ELPH-MCNC: 2.9 GM/DL
GLUCOSE SERPL-MCNC: 92 MG/DL (ref 65–99)
HCT VFR BLD AUTO: 44.8 % (ref 34–46.6)
HDLC SERPL-MCNC: 55 MG/DL (ref 40–60)
HGB BLD-MCNC: 14.8 G/DL (ref 12–15.9)
LDLC SERPL CALC-MCNC: 74 MG/DL (ref 0–100)
LDLC/HDLC SERPL: 1.32 {RATIO}
MCH RBC QN AUTO: 32.1 PG (ref 26.6–33)
MCHC RBC AUTO-ENTMCNC: 33 G/DL (ref 31.5–35.7)
MCV RBC AUTO: 97.2 FL (ref 79–97)
PLATELET # BLD AUTO: 174 10*3/MM3 (ref 140–450)
PMV BLD AUTO: 11.3 FL (ref 6–12)
POTASSIUM SERPL-SCNC: 4 MMOL/L (ref 3.5–5.2)
PROT SERPL-MCNC: 7.2 G/DL (ref 6–8.5)
RBC # BLD AUTO: 4.61 10*6/MM3 (ref 3.77–5.28)
SODIUM SERPL-SCNC: 141 MMOL/L (ref 136–145)
TRIGL SERPL-MCNC: 93 MG/DL (ref 0–150)
TSH SERPL DL<=0.05 MIU/L-ACNC: 1 UIU/ML (ref 0.27–4.2)
VLDLC SERPL-MCNC: 17 MG/DL (ref 5–40)
WBC NRBC COR # BLD AUTO: 6.51 10*3/MM3 (ref 3.4–10.8)

## 2025-06-19 PROCEDURE — 99214 OFFICE O/P EST MOD 30 MIN: CPT | Performed by: FAMILY MEDICINE

## 2025-06-19 PROCEDURE — 85027 COMPLETE CBC AUTOMATED: CPT | Performed by: FAMILY MEDICINE

## 2025-06-19 PROCEDURE — 80053 COMPREHEN METABOLIC PANEL: CPT | Performed by: FAMILY MEDICINE

## 2025-06-19 PROCEDURE — 84443 ASSAY THYROID STIM HORMONE: CPT | Performed by: FAMILY MEDICINE

## 2025-06-19 PROCEDURE — 1126F AMNT PAIN NOTED NONE PRSNT: CPT | Performed by: FAMILY MEDICINE

## 2025-06-19 PROCEDURE — 80061 LIPID PANEL: CPT | Performed by: FAMILY MEDICINE

## 2025-06-19 RX ORDER — METOPROLOL TARTRATE 50 MG
50 TABLET ORAL 2 TIMES DAILY
Qty: 180 TABLET | Refills: 1 | Status: SHIPPED | OUTPATIENT
Start: 2025-06-19

## 2025-06-19 RX ORDER — CHLORTHALIDONE 25 MG/1
25 TABLET ORAL DAILY
Qty: 90 TABLET | Refills: 3 | Status: SHIPPED | OUTPATIENT
Start: 2025-06-19

## 2025-06-19 RX ORDER — AMLODIPINE BESYLATE 10 MG/1
10 TABLET ORAL DAILY
Qty: 90 TABLET | Refills: 1 | Status: SHIPPED | OUTPATIENT
Start: 2025-06-19

## 2025-06-19 RX ORDER — PRAVASTATIN SODIUM 20 MG
20 TABLET ORAL NIGHTLY
Qty: 90 TABLET | Refills: 3 | Status: SHIPPED | OUTPATIENT
Start: 2025-06-19

## 2025-06-19 RX ORDER — SERTRALINE HYDROCHLORIDE 100 MG/1
100 TABLET, FILM COATED ORAL DAILY
Qty: 90 TABLET | Refills: 3 | Status: SHIPPED | OUTPATIENT
Start: 2025-06-19

## 2025-06-19 RX ORDER — CLOPIDOGREL BISULFATE 75 MG/1
75 TABLET ORAL DAILY
Qty: 90 TABLET | Refills: 3 | Status: SHIPPED | OUTPATIENT
Start: 2025-06-19

## 2025-06-19 RX ORDER — HYDRALAZINE HYDROCHLORIDE 100 MG/1
100 TABLET, FILM COATED ORAL 3 TIMES DAILY
Qty: 270 TABLET | Refills: 1 | Status: SHIPPED | OUTPATIENT
Start: 2025-06-19

## 2025-06-19 RX ORDER — BENAZEPRIL HYDROCHLORIDE 40 MG/1
40 TABLET ORAL DAILY
Qty: 90 TABLET | Refills: 3 | Status: SHIPPED | OUTPATIENT
Start: 2025-06-19

## 2025-06-19 NOTE — ASSESSMENT & PLAN NOTE
She feels rather lightheaded.  This may just be related to her uncontrolled hypertension.  Will check some additional labs to make sure those are also fine.

## 2025-06-19 NOTE — PROGRESS NOTES
Chief Complaint   Patient presents with    Follow-up     6 month     Hypertension     C/O vertigo   Requesting all medications be sent to [facility] except eliquis .  (ELIQUIS NEEDS TO BE SENT TO [address] FOR 30 DAY SUPPLY)    Hyperlipidemia        Subjective     Kristine Rahman  has a past medical history of Anxiety, Arthritis, Asthma, Basal cell carcinoma, Crohn's disease, Depression, Diverticulitis, Gastric ulcer, Hammer toe, and Lichen sclerosus.    Hypertension-she is currently has not been checking her blood pressure at home.  She states she is taking all of her blood pressure medication.  This includes her amlodipine her metoprolol her chlorthalidone and her hydralazine.  Her blood pressure is elevated here today at 187/63.    Hyperlipidemia-she does take her pravastatin on a nightly basis.    Atrial fibrillation-she denies any palpitations or tachycardia.  She does take her metoprolol and Eliquis routinely twice daily.  She denies any recurrent nosebleeds, gross hematuria, or blood per rectum.    Dizziness-she describes her dizziness as more lightheadedness and off balance.  She denies any vertiginous symptoms.  She states her lightheadedness seemed to start more today.  She states over the last several days she has felt more weak and lethargic.        PHQ-2 Depression Screening  Little interest or pleasure in doing things?     Feeling down, depressed, or hopeless?     PHQ-2 Total Score     PHQ-9 Depression Screening  Little interest or pleasure in doing things?     Feeling down, depressed, or hopeless?     Trouble falling or staying asleep, or sleeping too much?     Feeling tired or having little energy?     Poor appetite or overeating?     Feeling bad about yourself - or that you are a failure or have let yourself or your family down?     Trouble concentrating on things, such as reading the newspaper or watching television?     Moving or speaking so slowly that other people could have noticed? Or the  opposite - being so fidgety or restless that you have been moving around a lot more than usual?     Thoughts that you would be better off dead, or of hurting yourself in some way?     PHQ-9 Total Score     If you checked off any problems, how difficult have these problems made it for you to do your work, take care of things at home, or get along with other people?       Allergies   Allergen Reactions    Latex Unknown - Low Severity       Prior to Admission medications    Medication Sig Start Date End Date Taking? Authorizing Provider   amLODIPine (NORVASC) 10 MG tablet Take 1 tablet by mouth Daily. 4/21/25  Yes Sameer Garsia DO   apixaban (ELIQUIS) 5 MG tablet tablet Take 1 tablet by mouth 2 (Two) Times a Day. Indications: Atrial Fibrillation 4/21/25  Yes Sameer Garsia DO   benazepril (LOTENSIN) 40 MG tablet Take 1 tablet by mouth Daily. 8/19/24  Yes Sameer Garsia DO   Calcium Carbonate-Vit D-Min (CALCIUM 1200 PO) Take 1,200 mg by mouth Daily.   Yes Vane Greenberg MD   chlorthalidone (HYGROTON) 25 MG tablet TAKE ONE TABLET BY MOUTH EVERY DAY 1/30/25  Yes Sameer Garsia DO   clobetasol (Temovate) 0.05 % ointment Apply 1 Application topically to the appropriate area as directed 2 (Two) Times a Week. 9/9/24  Yes Terrie Tolbert APRN   clopidogrel (PLAVIX) 75 MG tablet TAKE 1 TABLET BY MOUTH DAILY. 1/29/25  Yes Sameer Garsia DO   hydrALAZINE (APRESOLINE) 50 MG tablet TAKE ONE TABLET BY MOUTH THREE TIMES A DAY 1/7/25  Yes Sameer Garsia DO   metoprolol tartrate (LOPRESSOR) 50 MG tablet TAKE ONE TABLET BY MOUTH TWICE A DAY 3/24/25  Yes Sameer Garsia DO   multivitamin with minerals (CENTRUM SILVER PO) Take 1 tablet by mouth Daily.   Yes Vane Greenberg MD   pravastatin (PRAVACHOL) 20 MG tablet TAKE 1 TABLET BY MOUTH EVERY NIGHT 1/30/25  Yes Sameer Garsia DO   sertraline (ZOLOFT) 100 MG tablet TAKE ONE TABLET BY MOUTH  EVERY DAY 3/13/25  Yes Sameer Garsia,         Patient Active Problem List   Diagnosis    Anxiety    Arthritis    Asthma    Colon polyps    Crohn's disease    Depression    Diverticulitis    Essential hypertension    Gastric ulcer    Hyperlipemia    Seasonal allergic rhinitis    Shortness of breath    Ulcerative lesion    Urinary incontinence without sensory awareness    COVID-19 virus detected    Lesion of skin of nose    Thyroid nodule    Medicare annual wellness visit, subsequent    Need for tetanus, diphtheria, and acellular pertussis (Tdap) vaccine    Need for shingles vaccine    Post-menopausal    Acute hypoxic respiratory failure    Longstanding persistent atrial fibrillation    Lung nodule    Lightheadedness        Past Surgical History:   Procedure Laterality Date    CHOLECYSTECTOMY      COLONOSCOPY      HEMORRHOIDECTOMY         Social History     Socioeconomic History    Marital status:     Number of children: 3   Tobacco Use    Smoking status: Never     Passive exposure: Never    Smokeless tobacco: Never   Vaping Use    Vaping status: Never Used   Substance and Sexual Activity    Alcohol use: Not Currently    Drug use: Never    Sexual activity: Not Currently     Birth control/protection: Post-menopausal       Family History   Problem Relation Age of Onset    Hypertension Son     Diabetes Daughter     Hypertension Daughter     Colon cancer Cousin     Breast cancer Neg Hx     Ovarian cancer Neg Hx     Uterine cancer Neg Hx     Prostate cancer Neg Hx        Family history, surgical history, past medical history, Allergies and meds reviewed with patient today and updated in [facility] EMR.     ROS:  Review of Systems   Constitutional:  Positive for fatigue.   HENT:  Positive for postnasal drip. Negative for congestion, nosebleeds and rhinorrhea.    Eyes:  Negative for visual disturbance.   Respiratory:  Negative for cough, chest tightness, shortness of breath and wheezing.    Cardiovascular:   "Negative for chest pain and palpitations.   Gastrointestinal:  Negative for blood in stool, constipation and diarrhea.   Genitourinary:  Negative for hematuria.   Neurological:  Positive for light-headedness.       OBJECTIVE:  Vitals:    06/19/25 1143 06/19/25 1242   BP: (!) 187/63 (!) 190/70   BP Location: Left arm Left arm   Patient Position: Sitting Sitting   Cuff Size:  Adult   Pulse: 58    Temp: 96 °F (35.6 °C)    SpO2: 90%    Weight: 70.8 kg (156 lb)    Height: 156.2 cm (61.5\")      No results found.   Body mass index is 29 kg/m².  No LMP recorded. Patient is postmenopausal.    The ASCVD Risk score (Olmstead DK, et al., 2019) failed to calculate for the following reasons:    The 2019 ASCVD risk score is only valid for ages 40 to 79     Physical Exam  Vitals and nursing note reviewed.   Constitutional:       General: She is not in acute distress.     Appearance: Normal appearance. She is normal weight.   HENT:      Head: Normocephalic.      Right Ear: Tympanic membrane, ear canal and external ear normal.      Left Ear: Tympanic membrane, ear canal and external ear normal.      Nose: Nose normal.      Mouth/Throat:      Mouth: Mucous membranes are moist.      Pharynx: Oropharynx is clear.   Eyes:      General: No scleral icterus.     Conjunctiva/sclera: Conjunctivae normal.      Pupils: Pupils are equal, round, and reactive to light.   Neck:      Vascular: Normal carotid pulses. No carotid bruit.   Cardiovascular:      Rate and Rhythm: Normal rate and regular rhythm.      Pulses: Normal pulses.      Heart sounds: Normal heart sounds. No murmur heard.  Pulmonary:      Effort: Pulmonary effort is normal.      Breath sounds: Normal breath sounds. No wheezing, rhonchi or rales.   Musculoskeletal:      Cervical back: Neck supple. No rigidity or tenderness.   Lymphadenopathy:      Cervical: No cervical adenopathy.   Skin:     General: Skin is warm and dry.      Coloration: Skin is not jaundiced.      Findings: No rash. "   Neurological:      General: No focal deficit present.      Mental Status: She is alert and oriented to person, place, and time.   Psychiatric:         Mood and Affect: Mood normal.         Thought Content: Thought content normal.         Judgment: Judgment normal.         Procedures    No visits with results within 30 Day(s) from this visit.   Latest known visit with results is:   Office Visit on 12/19/2024   Component Date Value Ref Range Status    Glucose 12/23/2024 126 (H)  65 - 99 mg/dL Final    BUN 12/23/2024 22  8 - 23 mg/dL Final    Creatinine 12/23/2024 1.07 (H)  0.57 - 1.00 mg/dL Final    Sodium 12/23/2024 144  136 - 145 mmol/L Final    Potassium 12/23/2024 3.6  3.5 - 5.2 mmol/L Final    Chloride 12/23/2024 106  98 - 107 mmol/L Final    CO2 12/23/2024 27.4  22.0 - 29.0 mmol/L Final    Calcium 12/23/2024 9.3  8.6 - 10.5 mg/dL Final    Total Protein 12/23/2024 6.5  6.0 - 8.5 g/dL Final    Albumin 12/23/2024 3.9  3.5 - 5.2 g/dL Final    ALT (SGPT) 12/23/2024 8  1 - 33 U/L Final    AST (SGOT) 12/23/2024 20  1 - 32 U/L Final    Alkaline Phosphatase 12/23/2024 90  39 - 117 U/L Final    Total Bilirubin 12/23/2024 0.4  0.0 - 1.2 mg/dL Final    Globulin 12/23/2024 2.6  gm/dL Final    A/G Ratio 12/23/2024 1.5  g/dL Final    BUN/Creatinine Ratio 12/23/2024 20.6  7.0 - 25.0 Final    Anion Gap 12/23/2024 10.6  5.0 - 15.0 mmol/L Final    eGFR 12/23/2024 50.4 (L)  >60.0 mL/min/1.73 Final    Total Cholesterol 12/23/2024 143  0 - 200 mg/dL Final    Triglycerides 12/23/2024 93  0 - 150 mg/dL Final    HDL Cholesterol 12/23/2024 52  40 - 60 mg/dL Final    LDL Cholesterol  12/23/2024 74  0 - 100 mg/dL Final    VLDL Cholesterol 12/23/2024 17  5 - 40 mg/dL Final    LDL/HDL Ratio 12/23/2024 1.39   Final    TSH 12/23/2024 1.320  0.270 - 4.200 uIU/mL Final       ASSESSMENT/ PLAN:    Diagnoses and all orders for this visit:    1. Longstanding persistent atrial fibrillation (Primary)  Assessment & Plan:  Clinically she sounds  regular today.  Will continue her metoprolol and Eliquis as prescribed.    Orders:  -     TSH Rfx On Abnormal To Free T4    2. Pure hypercholesterolemia  Assessment & Plan:   Update her lipid profile with her routine labs.    Orders:  -     Comprehensive Metabolic Panel  -     Lipid Panel  -     TSH Rfx On Abnormal To Free T4    3. Essential hypertension  Assessment & Plan:  Her initial blood pressure here today is rather elevated.  Will recheck it 1 more time.  Upon recheck her blood pressure was not significantly changed.  Will increase her hydralazine from 50 up to 100 mg 3 times a day.    Orders:  -     Comprehensive Metabolic Panel  -     Lipid Panel  -     CBC (No Diff)    4. Lightheadedness  Assessment & Plan:  She feels rather lightheaded.  This may just be related to her uncontrolled hypertension.  Will check some additional labs to make sure those are also fine.    Orders:  -     CBC (No Diff)    Other orders  -     amLODIPine (NORVASC) 10 MG tablet; Take 1 tablet by mouth Daily.  Dispense: 90 tablet; Refill: 1  -     benazepril (LOTENSIN) 40 MG tablet; Take 1 tablet by mouth Daily.  Dispense: 90 tablet; Refill: 3  -     chlorthalidone (HYGROTON) 25 MG tablet; Take 1 tablet by mouth Daily.  Dispense: 90 tablet; Refill: 3  -     clopidogrel (PLAVIX) 75 MG tablet; Take 1 tablet by mouth Daily.  Dispense: 90 tablet; Refill: 3  -     hydrALAZINE (APRESOLINE) 100 MG tablet; Take 1 tablet by mouth 3 (Three) Times a Day.  Dispense: 270 tablet; Refill: 1  -     metoprolol tartrate (LOPRESSOR) 50 MG tablet; Take 1 tablet by mouth 2 (Two) Times a Day.  Dispense: 180 tablet; Refill: 1  -     pravastatin (PRAVACHOL) 20 MG tablet; Take 1 tablet by mouth Every Night.  Dispense: 90 tablet; Refill: 3  -     sertraline (ZOLOFT) 100 MG tablet; Take 1 tablet by mouth Daily.  Dispense: 90 tablet; Refill: 3        BMI is >= 25 and <30. (Overweight) The following options were offered after discussion;: none (medical  contraindication)      Orders Placed Today:     New Medications Ordered This Visit   Medications    amLODIPine (NORVASC) 10 MG tablet     Sig: Take 1 tablet by mouth Daily.     Dispense:  90 tablet     Refill:  1    benazepril (LOTENSIN) 40 MG tablet     Sig: Take 1 tablet by mouth Daily.     Dispense:  90 tablet     Refill:  3    chlorthalidone (HYGROTON) 25 MG tablet     Sig: Take 1 tablet by mouth Daily.     Dispense:  90 tablet     Refill:  3    clopidogrel (PLAVIX) 75 MG tablet     Sig: Take 1 tablet by mouth Daily.     Dispense:  90 tablet     Refill:  3    hydrALAZINE (APRESOLINE) 100 MG tablet     Sig: Take 1 tablet by mouth 3 (Three) Times a Day.     Dispense:  270 tablet     Refill:  1    metoprolol tartrate (LOPRESSOR) 50 MG tablet     Sig: Take 1 tablet by mouth 2 (Two) Times a Day.     Dispense:  180 tablet     Refill:  1    pravastatin (PRAVACHOL) 20 MG tablet     Sig: Take 1 tablet by mouth Every Night.     Dispense:  90 tablet     Refill:  3    sertraline (ZOLOFT) 100 MG tablet     Sig: Take 1 tablet by mouth Daily.     Dispense:  90 tablet     Refill:  3        Management Plan:     An After Visit Summary was printed and given to the patient at discharge.    Follow-up: Return in about 4 weeks (around 7/17/2025) for Recheck.    Sameer Garsia DO 6/19/2025 12:47 EDT  This note was electronically signed.

## 2025-06-19 NOTE — ASSESSMENT & PLAN NOTE
Her initial blood pressure here today is rather elevated.  Will recheck it 1 more time.  Upon recheck her blood pressure was not significantly changed.  Will increase her hydralazine from 50 up to 100 mg 3 times a day.

## 2025-06-27 ENCOUNTER — CLINICAL SUPPORT (OUTPATIENT)
Dept: FAMILY MEDICINE CLINIC | Facility: CLINIC | Age: 89
End: 2025-06-27
Payer: MEDICARE

## 2025-06-27 DIAGNOSIS — N28.9 LOW KIDNEY FUNCTION: ICD-10-CM

## 2025-06-27 LAB
ALBUMIN SERPL-MCNC: 4 G/DL (ref 3.5–5.2)
ALBUMIN/GLOB SERPL: 1.9 G/DL
ALP SERPL-CCNC: 82 U/L (ref 39–117)
ALT SERPL W P-5'-P-CCNC: 10 U/L (ref 1–33)
ANION GAP SERPL CALCULATED.3IONS-SCNC: 10.6 MMOL/L (ref 5–15)
AST SERPL-CCNC: 18 U/L (ref 1–32)
BILIRUB SERPL-MCNC: 0.6 MG/DL (ref 0–1.2)
BUN SERPL-MCNC: 20 MG/DL (ref 8–23)
BUN/CREAT SERPL: 22.5 (ref 7–25)
CALCIUM SPEC-SCNC: 9.6 MG/DL (ref 8.6–10.5)
CHLORIDE SERPL-SCNC: 102 MMOL/L (ref 98–107)
CO2 SERPL-SCNC: 25.4 MMOL/L (ref 22–29)
CREAT SERPL-MCNC: 0.89 MG/DL (ref 0.57–1)
EGFRCR SERPLBLD CKD-EPI 2021: 62.8 ML/MIN/1.73
GLOBULIN UR ELPH-MCNC: 2.1 GM/DL
GLUCOSE SERPL-MCNC: 94 MG/DL (ref 65–99)
POTASSIUM SERPL-SCNC: 3.9 MMOL/L (ref 3.5–5.2)
PROT SERPL-MCNC: 6.1 G/DL (ref 6–8.5)
SODIUM SERPL-SCNC: 138 MMOL/L (ref 136–145)

## 2025-06-27 PROCEDURE — 36415 COLL VENOUS BLD VENIPUNCTURE: CPT | Performed by: FAMILY MEDICINE

## 2025-06-27 PROCEDURE — 80053 COMPREHEN METABOLIC PANEL: CPT | Performed by: FAMILY MEDICINE

## 2025-06-29 ENCOUNTER — RESULTS FOLLOW-UP (OUTPATIENT)
Dept: FAMILY MEDICINE CLINIC | Facility: CLINIC | Age: 89
End: 2025-06-29
Payer: MEDICARE

## 2025-06-30 NOTE — TELEPHONE ENCOUNTER
Name: HELGATRISTON    Relationship: Emergency Contact    Best Callback Number: [phone]     HUB PROVIDED THE RELAY MESSAGE FROM THE OFFICE   PATIENT VOICED UNDERSTANDING AND HAS NO FURTHER QUESTIONS AT THIS TIME

## 2025-07-17 ENCOUNTER — OFFICE VISIT (OUTPATIENT)
Dept: FAMILY MEDICINE CLINIC | Facility: CLINIC | Age: 89
End: 2025-07-17
Payer: MEDICARE

## 2025-07-17 VITALS
BODY MASS INDEX: 28.52 KG/M2 | SYSTOLIC BLOOD PRESSURE: 154 MMHG | OXYGEN SATURATION: 97 % | WEIGHT: 155 LBS | TEMPERATURE: 98 F | HEIGHT: 62 IN | HEART RATE: 60 BPM | DIASTOLIC BLOOD PRESSURE: 70 MMHG

## 2025-07-17 DIAGNOSIS — I48.11 LONGSTANDING PERSISTENT ATRIAL FIBRILLATION: Primary | ICD-10-CM

## 2025-07-17 DIAGNOSIS — I10 ESSENTIAL HYPERTENSION: ICD-10-CM

## 2025-07-17 PROCEDURE — 99213 OFFICE O/P EST LOW 20 MIN: CPT | Performed by: FAMILY MEDICINE

## 2025-07-17 PROCEDURE — 1126F AMNT PAIN NOTED NONE PRSNT: CPT | Performed by: FAMILY MEDICINE

## 2025-07-17 NOTE — ASSESSMENT & PLAN NOTE
Her initial blood pressure here today is a little elevated, but her home blood pressure readings are great.  Will go ahead and recheck it 1 more time.

## 2025-07-17 NOTE — PROGRESS NOTES
Chief Complaint   Patient presents with    Follow-up     4 week/ Longstanding persistent atrial fibrillation        Subjective     Kristine Rahman  has a past medical history of Anxiety, Arthritis, Asthma, Basal cell carcinoma, Crohn's disease, Depression, Diverticulitis, Gastric ulcer, Hammer toe, and Lichen sclerosus.    Hypertension-she has been checking her blood pressure at home.  Typically her home blood pressures are in the 110-120/60-70.  She states she has been taking all of her medicines as prescribed.  Her initial blood pressure here today is somewhat elevated at 158/67        PHQ-2 Depression Screening  Little interest or pleasure in doing things?     Feeling down, depressed, or hopeless?     PHQ-2 Total Score     PHQ-9 Depression Screening  Little interest or pleasure in doing things?     Feeling down, depressed, or hopeless?     Trouble falling or staying asleep, or sleeping too much?     Feeling tired or having little energy?     Poor appetite or overeating?     Feeling bad about yourself - or that you are a failure or have let yourself or your family down?     Trouble concentrating on things, such as reading the newspaper or watching television?     Moving or speaking so slowly that other people could have noticed? Or the opposite - being so fidgety or restless that you have been moving around a lot more than usual?     Thoughts that you would be better off dead, or of hurting yourself in some way?     PHQ-9 Total Score     If you checked off any problems, how difficult have these problems made it for you to do your work, take care of things at home, or get along with other people?       Allergies   Allergen Reactions    Latex Unknown - Low Severity       Prior to Admission medications    Medication Sig Start Date End Date Taking? Authorizing Provider   amLODIPine (NORVASC) 10 MG tablet Take 1 tablet by mouth Daily. 6/19/25  Yes Sameer Garsia, DO   apixaban (ELIQUIS) 5 MG tablet tablet Take  1 tablet by mouth 2 (Two) Times a Day. Indications: Atrial Fibrillation 4/21/25  Yes Sameer Garsia DO   benazepril (LOTENSIN) 40 MG tablet Take 1 tablet by mouth Daily. 6/19/25  Yes Sameer Garsia DO   Calcium Carbonate-Vit D-Min (CALCIUM 1200 PO) Take 1,200 mg by mouth Daily.   Yes Vane Greenberg MD   chlorthalidone (HYGROTON) 25 MG tablet Take 1 tablet by mouth Daily. 6/19/25  Yes Sameer Garsia DO   clobetasol (Temovate) 0.05 % ointment Apply 1 Application topically to the appropriate area as directed 2 (Two) Times a Week. 9/9/24  Yes Terrie Tolbert APRN   clopidogrel (PLAVIX) 75 MG tablet Take 1 tablet by mouth Daily. 6/19/25  Yes Sameer Garsia DO   hydrALAZINE (APRESOLINE) 100 MG tablet Take 1 tablet by mouth 3 (Three) Times a Day.  Patient taking differently: Take 0.5 tablets by mouth 3 (Three) Times a Day. 6/19/25  Yes Sameer Garsia DO   metoprolol tartrate (LOPRESSOR) 50 MG tablet Take 1 tablet by mouth 2 (Two) Times a Day. 6/19/25  Yes Sameer Garsia DO   multivitamin with minerals (CENTRUM SILVER PO) Take 1 tablet by mouth Daily.   Yes Vane Greenberg MD   pravastatin (PRAVACHOL) 20 MG tablet Take 1 tablet by mouth Every Night. 6/19/25  Yes Sameer Garsia DO   sertraline (ZOLOFT) 100 MG tablet Take 1 tablet by mouth Daily. 6/19/25  Yes Sameer Garsia DO        Patient Active Problem List   Diagnosis    Anxiety    Arthritis    Asthma    Colon polyps    Crohn's disease    Depression    Diverticulitis    Essential hypertension    Gastric ulcer    Hyperlipemia    Seasonal allergic rhinitis    Shortness of breath    Ulcerative lesion    Urinary incontinence without sensory awareness    COVID-19 virus detected    Lesion of skin of nose    Thyroid nodule    Medicare annual wellness visit, subsequent    Need for tetanus, diphtheria, and acellular pertussis (Tdap) vaccine    Need for shingles vaccine     "Post-menopausal    Acute hypoxic respiratory failure    Longstanding persistent atrial fibrillation    Lung nodule    Lightheadedness        Past Surgical History:   Procedure Laterality Date    CHOLECYSTECTOMY      COLONOSCOPY      HEMORRHOIDECTOMY         Social History     Socioeconomic History    Marital status:     Number of children: 3   Tobacco Use    Smoking status: Never     Passive exposure: Never    Smokeless tobacco: Never   Vaping Use    Vaping status: Never Used   Substance and Sexual Activity    Alcohol use: Not Currently    Drug use: Never    Sexual activity: Not Currently     Birth control/protection: Post-menopausal       Family History   Problem Relation Age of Onset    Hypertension Son     Diabetes Daughter     Hypertension Daughter     Colon cancer Cousin     Breast cancer Neg Hx     Ovarian cancer Neg Hx     Uterine cancer Neg Hx     Prostate cancer Neg Hx        Family history, surgical history, past medical history, Allergies and meds reviewed with patient today and updated in [facility] EMR.     ROS:  Review of Systems   Constitutional:  Negative for fatigue.   Respiratory:  Negative for cough, chest tightness, shortness of breath and wheezing.    Cardiovascular:  Negative for chest pain and palpitations.   Neurological:  Negative for headache.       OBJECTIVE:  Vitals:    07/17/25 1019 07/17/25 1040   BP: 158/67 154/70   BP Location: Left arm Left arm   Patient Position: Sitting Sitting   Cuff Size:  Adult   Pulse: 60    Temp: 98 °F (36.7 °C)    SpO2: 97%    Weight: 70.3 kg (155 lb)    Height: 156.2 cm (61.5\")      No results found.   Body mass index is 28.81 kg/m².  No LMP recorded. Patient is postmenopausal.    The ASCVD Risk score (Kyler DK, et al., 2019) failed to calculate for the following reasons:    The 2019 ASCVD risk score is only valid for ages 40 to 79     Physical Exam  Vitals and nursing note reviewed.   Constitutional:       General: She is not in acute distress.     " Appearance: Normal appearance. She is normal weight.   HENT:      Head: Normocephalic.   Cardiovascular:      Rate and Rhythm: Normal rate and regular rhythm.      Heart sounds: Normal heart sounds. No murmur heard.  Pulmonary:      Effort: Pulmonary effort is normal.      Breath sounds: Normal breath sounds. No wheezing or rhonchi.   Neurological:      Mental Status: She is alert and oriented to person, place, and time.   Psychiatric:         Mood and Affect: Mood normal.         Thought Content: Thought content normal.         Judgment: Judgment normal.         Procedures    Clinical Support on 06/27/2025   Component Date Value Ref Range Status    Glucose 06/27/2025 94  65 - 99 mg/dL Final    BUN 06/27/2025 20.0  8.0 - 23.0 mg/dL Final    Creatinine 06/27/2025 0.89  0.57 - 1.00 mg/dL Final    Sodium 06/27/2025 138  136 - 145 mmol/L Final    Potassium 06/27/2025 3.9  3.5 - 5.2 mmol/L Final    Chloride 06/27/2025 102  98 - 107 mmol/L Final    CO2 06/27/2025 25.4  22.0 - 29.0 mmol/L Final    Calcium 06/27/2025 9.6  8.6 - 10.5 mg/dL Final    Total Protein 06/27/2025 6.1  6.0 - 8.5 g/dL Final    Albumin 06/27/2025 4.0  3.5 - 5.2 g/dL Final    ALT (SGPT) 06/27/2025 10  1 - 33 U/L Final    AST (SGOT) 06/27/2025 18  1 - 32 U/L Final    Alkaline Phosphatase 06/27/2025 82  39 - 117 U/L Final    Total Bilirubin 06/27/2025 0.6  0.0 - 1.2 mg/dL Final    Globulin 06/27/2025 2.1  gm/dL Final    A/G Ratio 06/27/2025 1.9  g/dL Final    BUN/Creatinine Ratio 06/27/2025 22.5  7.0 - 25.0 Final    Anion Gap 06/27/2025 10.6  5.0 - 15.0 mmol/L Final    eGFR 06/27/2025 62.8  >60.0 mL/min/1.73 Final   Office Visit on 06/19/2025   Component Date Value Ref Range Status    Glucose 06/19/2025 92  65 - 99 mg/dL Final    BUN 06/19/2025 35.0 (H)  8.0 - 23.0 mg/dL Final    Creatinine 06/19/2025 1.36 (H)  0.57 - 1.00 mg/dL Final    Sodium 06/19/2025 141  136 - 145 mmol/L Final    Potassium 06/19/2025 4.0  3.5 - 5.2 mmol/L Final    Chloride  06/19/2025 103  98 - 107 mmol/L Final    CO2 06/19/2025 25.7  22.0 - 29.0 mmol/L Final    Calcium 06/19/2025 10.1  8.6 - 10.5 mg/dL Final    Total Protein 06/19/2025 7.2  6.0 - 8.5 g/dL Final    Albumin 06/19/2025 4.3  3.5 - 5.2 g/dL Final    ALT (SGPT) 06/19/2025 10  1 - 33 U/L Final    AST (SGOT) 06/19/2025 24  1 - 32 U/L Final    Alkaline Phosphatase 06/19/2025 103  39 - 117 U/L Final    Total Bilirubin 06/19/2025 0.6  0.0 - 1.2 mg/dL Final    Globulin 06/19/2025 2.9  gm/dL Final    A/G Ratio 06/19/2025 1.5  g/dL Final    BUN/Creatinine Ratio 06/19/2025 25.7 (H)  7.0 - 25.0 Final    Anion Gap 06/19/2025 12.3  5.0 - 15.0 mmol/L Final    eGFR 06/19/2025 37.8 (L)  >60.0 mL/min/1.73 Final    Total Cholesterol 06/19/2025 146  0 - 200 mg/dL Final    Triglycerides 06/19/2025 93  0 - 150 mg/dL Final    HDL Cholesterol 06/19/2025 55  40 - 60 mg/dL Final    LDL Cholesterol  06/19/2025 74  0 - 100 mg/dL Final    VLDL Cholesterol 06/19/2025 17  5 - 40 mg/dL Final    LDL/HDL Ratio 06/19/2025 1.32   Final    WBC 06/19/2025 6.51  3.40 - 10.80 10*3/mm3 Final    RBC 06/19/2025 4.61  3.77 - 5.28 10*6/mm3 Final    Hemoglobin 06/19/2025 14.8  12.0 - 15.9 g/dL Final    Hematocrit 06/19/2025 44.8  34.0 - 46.6 % Final    MCV 06/19/2025 97.2 (H)  79.0 - 97.0 fL Final    MCH 06/19/2025 32.1  26.6 - 33.0 pg Final    MCHC 06/19/2025 33.0  31.5 - 35.7 g/dL Final    RDW 06/19/2025 12.2 (L)  12.3 - 15.4 % Final    RDW-SD 06/19/2025 43.4  37.0 - 54.0 fl Final    MPV 06/19/2025 11.3  6.0 - 12.0 fL Final    Platelets 06/19/2025 174  140 - 450 10*3/mm3 Final    TSH 06/19/2025 1.000  0.270 - 4.200 uIU/mL Final       ASSESSMENT/ PLAN:    Diagnoses and all orders for this visit:    1. Longstanding persistent atrial fibrillation (Primary)  Assessment & Plan:  Clinically she sounds regular here today.  She will continue her current meds without change.      2. Essential hypertension  Assessment & Plan:  Her initial blood pressure here today is a  little elevated, but her home blood pressure readings are great.  Will go ahead and recheck it 1 more time.          BMI is >= 25 and <30. (Overweight) The following options were offered after discussion;: exercise counseling/recommendations and nutrition counseling/recommendations      Orders Placed Today:     No orders of the defined types were placed in this encounter.       Management Plan:     An After Visit Summary was printed and given to the patient at discharge.    Follow-up: No follow-ups on file.    Sameer Garsia DO 7/17/2025 10:43 EDT  This note was electronically signed.

## 2025-07-30 RX ORDER — BENAZEPRIL HYDROCHLORIDE 40 MG/1
40 TABLET ORAL DAILY
Qty: 90 TABLET | Refills: 3 | OUTPATIENT
Start: 2025-07-30

## 2025-08-04 ENCOUNTER — HOSPITAL ENCOUNTER (OUTPATIENT)
Dept: MAMMOGRAPHY | Facility: HOSPITAL | Age: 89
Discharge: HOME | End: 2025-08-04
Admitting: FAMILY MEDICINE
Payer: MEDICARE

## 2025-08-04 DIAGNOSIS — Z12.31 SCREENING MAMMOGRAM FOR BREAST CANCER: ICD-10-CM

## 2025-08-04 PROCEDURE — 77067 SCR MAMMO BI INCL CAD: CPT

## 2025-08-04 PROCEDURE — 77063 BREAST TOMOSYNTHESIS BI: CPT
